# Patient Record
Sex: MALE | Race: WHITE | NOT HISPANIC OR LATINO | URBAN - METROPOLITAN AREA
[De-identification: names, ages, dates, MRNs, and addresses within clinical notes are randomized per-mention and may not be internally consistent; named-entity substitution may affect disease eponyms.]

---

## 2018-04-22 ENCOUNTER — APPOINTMENT (INPATIENT)
Dept: RADIOLOGY | Facility: HOSPITAL | Age: 73
DRG: 315 | End: 2018-04-22
Attending: HOSPITALIST
Payer: MEDICARE

## 2018-04-22 ENCOUNTER — APPOINTMENT (EMERGENCY)
Dept: RADIOLOGY | Facility: HOSPITAL | Age: 73
DRG: 315 | End: 2018-04-22
Attending: STUDENT IN AN ORGANIZED HEALTH CARE EDUCATION/TRAINING PROGRAM
Payer: MEDICARE

## 2018-04-22 ENCOUNTER — HOSPITAL ENCOUNTER (INPATIENT)
Facility: HOSPITAL | Age: 73
LOS: 5 days | Discharge: HOME | DRG: 315 | End: 2018-04-27
Attending: STUDENT IN AN ORGANIZED HEALTH CARE EDUCATION/TRAINING PROGRAM | Admitting: HOSPITALIST
Payer: MEDICARE

## 2018-04-22 DIAGNOSIS — R07.89 OTHER CHEST PAIN: ICD-10-CM

## 2018-04-22 DIAGNOSIS — R07.9 CHEST PAIN, UNSPECIFIED TYPE: ICD-10-CM

## 2018-04-22 DIAGNOSIS — R79.89 ELEVATED TROPONIN: ICD-10-CM

## 2018-04-22 DIAGNOSIS — R74.8 ABNORMAL LIVER ENZYMES: ICD-10-CM

## 2018-04-22 DIAGNOSIS — I42.9 CARDIOMYOPATHY, UNSPECIFIED TYPE (CMS/HCC): ICD-10-CM

## 2018-04-22 DIAGNOSIS — I48.4 ATYPICAL ATRIAL FLUTTER (CMS/HCC): ICD-10-CM

## 2018-04-22 DIAGNOSIS — I48.92 ATRIAL FLUTTER, UNSPECIFIED TYPE (CMS/HCC): Primary | ICD-10-CM

## 2018-04-22 PROBLEM — R10.11 RIGHT UPPER QUADRANT ABDOMINAL PAIN: Status: ACTIVE | Noted: 2018-04-22

## 2018-04-22 PROBLEM — F41.9 ANXIETY: Status: ACTIVE | Noted: 2018-04-22

## 2018-04-22 PROBLEM — D72.829 LEUKOCYTOSIS: Status: ACTIVE | Noted: 2018-04-22

## 2018-04-22 LAB
AMYLASE SERPL-CCNC: 55 U/L (ref 28–100)
ANION GAP SERPL CALC-SCNC: 10 MEQ/L (ref 3–15)
ATRIAL RATE: 302
BASOPHILS # BLD: 0.06 K/UL (ref 0.01–0.1)
BASOPHILS NFR BLD: 0.5 %
BNP SERPL-MCNC: 325 PG/ML
BUN SERPL-MCNC: 19 MG/DL (ref 8–20)
CALCIUM SERPL-MCNC: 8.6 MG/DL (ref 8.9–10.3)
CHLORIDE SERPL-SCNC: 109 MMOL/L (ref 98–109)
CHOLEST SERPL-MCNC: 128 MG/DL
CO2 SERPL-SCNC: 20 MMOL/L (ref 22–32)
CREAT SERPL-MCNC: 1.2 MG/DL (ref 0.8–1.3)
DIFFERENTIAL METHOD BLD: ABNORMAL
EOSINOPHIL # BLD: 0.58 K/UL (ref 0.04–0.54)
EOSINOPHIL NFR BLD: 4.5 %
ERYTHROCYTE [DISTWIDTH] IN BLOOD BY AUTOMATED COUNT: 13.5 % (ref 11.6–14.4)
GFR SERPL CREATININE-BSD FRML MDRD: 59.2 ML/MIN/1.73M*2
GLUCOSE SERPL-MCNC: 116 MG/DL (ref 70–99)
HCT VFR BLDCO AUTO: 39.7 % (ref 40–51)
HDLC SERPL-MCNC: 55 MG/DL
HDLC SERPL: 2.3 {RATIO}
HGB BLD-MCNC: 13.4 G/DL (ref 13.7–17.5)
IMM GRANULOCYTES # BLD AUTO: 0.04 K/UL (ref 0–0.08)
IMM GRANULOCYTES NFR BLD AUTO: 0.3 %
LDLC SERPL CALC-MCNC: 65 MG/DL
LIPASE SERPL-CCNC: 20 U/L (ref 20–51)
LYMPHOCYTES # BLD: 1.31 K/UL (ref 1.2–3.5)
LYMPHOCYTES NFR BLD: 10.2 %
MCH RBC QN AUTO: 32.9 PG (ref 28–33.2)
MCHC RBC AUTO-ENTMCNC: 33.8 G/DL (ref 32.2–36.5)
MCV RBC AUTO: 97.5 FL (ref 83–98)
MONOCYTES # BLD: 1.38 K/UL (ref 0.3–1)
MONOCYTES NFR BLD: 10.8 %
NEUTROPHILS # BLD: 9.42 K/UL (ref 1.7–7)
NEUTS SEG NFR BLD: 73.7 %
NONHDLC SERPL-MCNC: 73 MG/DL
NRBC BLD-RTO: 0 %
P AXIS: -63
PDW BLD AUTO: 9.9 FL (ref 9.4–12.4)
PLATELET # BLD AUTO: 207 K/UL (ref 150–350)
POTASSIUM SERPL-SCNC: 4 MMOL/L (ref 3.6–5.1)
QRS DURATION: 102
QT INTERVAL: 318
QTC CALCULATION(BAZETT): 504
R AXIS: 84
RBC # BLD AUTO: 4.07 M/UL (ref 4.5–5.8)
SODIUM SERPL-SCNC: 139 MMOL/L (ref 136–144)
T WAVE AXIS: 57
TRIGL SERPL-MCNC: 38 MG/DL (ref 30–149)
TROPONIN I SERPL-MCNC: 0.05 NG/ML
TROPONIN I SERPL-MCNC: 0.06 NG/ML
TROPONIN I SERPL-MCNC: 0.06 NG/ML
TSH SERPL DL<=0.05 MIU/L-ACNC: 2.7 UIU/ML (ref 0.34–5.6)
VENTRICULAR RATE: 151
WBC # BLD AUTO: 12.79 K/UL (ref 3.8–10.5)

## 2018-04-22 PROCEDURE — 76705 ECHO EXAM OF ABDOMEN: CPT

## 2018-04-22 PROCEDURE — 83690 ASSAY OF LIPASE: CPT | Performed by: HOSPITALIST

## 2018-04-22 PROCEDURE — 96374 THER/PROPH/DIAG INJ IV PUSH: CPT

## 2018-04-22 PROCEDURE — 71045 X-RAY EXAM CHEST 1 VIEW: CPT

## 2018-04-22 PROCEDURE — 63700000 HC SELF-ADMINISTRABLE DRUG: Performed by: HOSPITALIST

## 2018-04-22 PROCEDURE — 84484 ASSAY OF TROPONIN QUANT: CPT | Mod: 91 | Performed by: PHYSICIAN ASSISTANT

## 2018-04-22 PROCEDURE — 84443 ASSAY THYROID STIM HORMONE: CPT | Performed by: HOSPITALIST

## 2018-04-22 PROCEDURE — 63600105 HC IODINE BASED CONTRAST: Performed by: PHYSICIAN ASSISTANT

## 2018-04-22 PROCEDURE — 96375 TX/PRO/DX INJ NEW DRUG ADDON: CPT

## 2018-04-22 PROCEDURE — 83880 ASSAY OF NATRIURETIC PEPTIDE: CPT | Performed by: PHYSICIAN ASSISTANT

## 2018-04-22 PROCEDURE — 63600000 HC DRUGS/DETAIL CODE: Performed by: PHYSICIAN ASSISTANT

## 2018-04-22 PROCEDURE — 63600000 HC DRUGS/DETAIL CODE: Performed by: HOSPITALIST

## 2018-04-22 PROCEDURE — 25800000 HC PHARMACY IV SOLUTIONS: Performed by: PHYSICIAN ASSISTANT

## 2018-04-22 PROCEDURE — 36415 COLL VENOUS BLD VENIPUNCTURE: CPT | Performed by: HOSPITALIST

## 2018-04-22 PROCEDURE — 71260 CT THORAX DX C+: CPT

## 2018-04-22 PROCEDURE — 21400000 HC ROOM AND CARE CCU/INTERMEDIATE

## 2018-04-22 PROCEDURE — 99285 EMERGENCY DEPT VISIT HI MDM: CPT | Mod: 25

## 2018-04-22 PROCEDURE — 25800000 HC PHARMACY IV SOLUTIONS: Performed by: HOSPITALIST

## 2018-04-22 PROCEDURE — 93005 ELECTROCARDIOGRAM TRACING: CPT | Performed by: HOSPITALIST

## 2018-04-22 PROCEDURE — 84484 ASSAY OF TROPONIN QUANT: CPT | Performed by: PHYSICIAN ASSISTANT

## 2018-04-22 PROCEDURE — 93005 ELECTROCARDIOGRAM TRACING: CPT | Performed by: PHYSICIAN ASSISTANT

## 2018-04-22 PROCEDURE — 80061 LIPID PANEL: CPT | Performed by: HOSPITALIST

## 2018-04-22 PROCEDURE — 82150 ASSAY OF AMYLASE: CPT | Performed by: HOSPITALIST

## 2018-04-22 PROCEDURE — 84484 ASSAY OF TROPONIN QUANT: CPT | Performed by: HOSPITALIST

## 2018-04-22 PROCEDURE — 80048 BASIC METABOLIC PNL TOTAL CA: CPT | Performed by: PHYSICIAN ASSISTANT

## 2018-04-22 PROCEDURE — 25000000 HC PHARMACY GENERAL: Performed by: PHYSICIAN ASSISTANT

## 2018-04-22 PROCEDURE — 200200 PR NO CHARGE: Performed by: HOSPITALIST

## 2018-04-22 PROCEDURE — 99223 1ST HOSP IP/OBS HIGH 75: CPT | Performed by: HOSPITALIST

## 2018-04-22 PROCEDURE — 36415 COLL VENOUS BLD VENIPUNCTURE: CPT | Performed by: PHYSICIAN ASSISTANT

## 2018-04-22 PROCEDURE — 63700000 HC SELF-ADMINISTRABLE DRUG: Performed by: PHYSICIAN ASSISTANT

## 2018-04-22 PROCEDURE — 85025 COMPLETE CBC W/AUTO DIFF WBC: CPT | Performed by: PHYSICIAN ASSISTANT

## 2018-04-22 RX ORDER — DEXTROSE 50 % IN WATER (D50W) INTRAVENOUS SYRINGE
25 AS NEEDED
Status: DISCONTINUED | OUTPATIENT
Start: 2018-04-22 | End: 2018-04-27 | Stop reason: HOSPADM

## 2018-04-22 RX ORDER — METOPROLOL TARTRATE 1 MG/ML
5 INJECTION, SOLUTION INTRAVENOUS ONCE
Status: COMPLETED | OUTPATIENT
Start: 2018-04-22 | End: 2018-04-22

## 2018-04-22 RX ORDER — MELATONIN 5 MG
5 CAPSULE ORAL NIGHTLY PRN
Status: DISCONTINUED | OUTPATIENT
Start: 2018-04-22 | End: 2018-04-27 | Stop reason: HOSPADM

## 2018-04-22 RX ORDER — ENOXAPARIN SODIUM 100 MG/ML
40 INJECTION SUBCUTANEOUS
Status: CANCELLED | OUTPATIENT
Start: 2018-04-22 | End: 2018-07-21

## 2018-04-22 RX ORDER — DEXTROSE 40 %
15-30 GEL (GRAM) ORAL AS NEEDED
Status: DISCONTINUED | OUTPATIENT
Start: 2018-04-22 | End: 2018-04-27 | Stop reason: HOSPADM

## 2018-04-22 RX ORDER — ONDANSETRON HYDROCHLORIDE 2 MG/ML
4 INJECTION, SOLUTION INTRAVENOUS EVERY 8 HOURS PRN
Status: DISCONTINUED | OUTPATIENT
Start: 2018-04-22 | End: 2018-04-27 | Stop reason: HOSPADM

## 2018-04-22 RX ORDER — DILTIAZEM HCL IN NACL,ISO-OSM 125 MG/125
15 PLASTIC BAG, INJECTION (ML) INTRAVENOUS
Status: DISPENSED | OUTPATIENT
Start: 2018-04-22 | End: 2018-04-22

## 2018-04-22 RX ORDER — ONDANSETRON HYDROCHLORIDE 2 MG/ML
4 INJECTION, SOLUTION INTRAVENOUS EVERY 8 HOURS PRN
Status: CANCELLED | OUTPATIENT
Start: 2018-04-22 | End: 2018-07-21

## 2018-04-22 RX ORDER — ENOXAPARIN SODIUM 100 MG/ML
40 INJECTION SUBCUTANEOUS
Status: DISCONTINUED | OUTPATIENT
Start: 2018-04-22 | End: 2018-04-22

## 2018-04-22 RX ORDER — ACETAMINOPHEN 325 MG/1
650 TABLET ORAL EVERY 4 HOURS PRN
Status: CANCELLED | OUTPATIENT
Start: 2018-04-22 | End: 2018-07-21

## 2018-04-22 RX ORDER — DEXTROSE 40 %
15-30 GEL (GRAM) ORAL AS NEEDED
Status: CANCELLED | OUTPATIENT
Start: 2018-04-22 | End: 2018-07-21

## 2018-04-22 RX ORDER — ALUMINUM HYDROXIDE, MAGNESIUM HYDROXIDE, AND SIMETHICONE 1200; 120; 1200 MG/30ML; MG/30ML; MG/30ML
30 SUSPENSION ORAL EVERY 4 HOURS PRN
Status: DISCONTINUED | OUTPATIENT
Start: 2018-04-22 | End: 2018-04-27 | Stop reason: HOSPADM

## 2018-04-22 RX ORDER — ATROPINE SULFATE 0.1 MG/ML
0.5 INJECTION INTRAVENOUS EVERY 5 MIN PRN
Status: CANCELLED | OUTPATIENT
Start: 2018-04-22 | End: 2018-07-21

## 2018-04-22 RX ORDER — PANTOPRAZOLE SODIUM 40 MG/1
40 TABLET, DELAYED RELEASE ORAL DAILY
Status: DISCONTINUED | OUTPATIENT
Start: 2018-04-22 | End: 2018-04-27 | Stop reason: HOSPADM

## 2018-04-22 RX ORDER — DILTIAZEM HCL IN NACL,ISO-OSM 125 MG/125
5-15 PLASTIC BAG, INJECTION (ML) INTRAVENOUS
Status: CANCELLED | OUTPATIENT
Start: 2018-04-22 | End: 2018-04-29

## 2018-04-22 RX ORDER — ENOXAPARIN SODIUM 100 MG/ML
1 INJECTION SUBCUTANEOUS EVERY 12 HOURS
Status: DISCONTINUED | OUTPATIENT
Start: 2018-04-22 | End: 2018-04-26

## 2018-04-22 RX ORDER — DEXTROSE 50 % IN WATER (D50W) INTRAVENOUS SYRINGE
25 AS NEEDED
Status: CANCELLED | OUTPATIENT
Start: 2018-04-22 | End: 2018-07-21

## 2018-04-22 RX ORDER — IBUPROFEN 200 MG
16-32 TABLET ORAL AS NEEDED
Status: CANCELLED | OUTPATIENT
Start: 2018-04-22 | End: 2018-07-21

## 2018-04-22 RX ORDER — DILTIAZEM HYDROCHLORIDE 5 MG/ML
5 INJECTION INTRAVENOUS ONCE
Status: COMPLETED | OUTPATIENT
Start: 2018-04-22 | End: 2018-04-22

## 2018-04-22 RX ORDER — CARVEDILOL 6.25 MG/1
6.25 TABLET ORAL 2 TIMES DAILY WITH MEALS
Status: DISCONTINUED | OUTPATIENT
Start: 2018-04-22 | End: 2018-04-22

## 2018-04-22 RX ORDER — NAPROXEN SODIUM 220 MG/1
324 TABLET, FILM COATED ORAL ONCE
Status: COMPLETED | OUTPATIENT
Start: 2018-04-22 | End: 2018-04-22

## 2018-04-22 RX ORDER — ACETAMINOPHEN 325 MG/1
650 TABLET ORAL EVERY 4 HOURS PRN
Status: DISCONTINUED | OUTPATIENT
Start: 2018-04-22 | End: 2018-04-25

## 2018-04-22 RX ORDER — DOCUSATE SODIUM 100 MG/1
100 CAPSULE, LIQUID FILLED ORAL 2 TIMES DAILY
Status: CANCELLED | OUTPATIENT
Start: 2018-04-22 | End: 2018-07-21

## 2018-04-22 RX ORDER — IBUPROFEN 200 MG
16-32 TABLET ORAL AS NEEDED
Status: DISCONTINUED | OUTPATIENT
Start: 2018-04-22 | End: 2018-04-27 | Stop reason: HOSPADM

## 2018-04-22 RX ORDER — SODIUM CHLORIDE 9 MG/ML
INJECTION, SOLUTION INTRAVENOUS CONTINUOUS
Status: DISCONTINUED | OUTPATIENT
Start: 2018-04-22 | End: 2018-04-25

## 2018-04-22 RX ORDER — ASPIRIN 325 MG
325 TABLET, DELAYED RELEASE (ENTERIC COATED) ORAL DAILY
Status: CANCELLED | OUTPATIENT
Start: 2018-04-22 | End: 2018-07-21

## 2018-04-22 RX ORDER — METOPROLOL TARTRATE 25 MG/1
25 TABLET, FILM COATED ORAL 2 TIMES DAILY
Status: DISCONTINUED | OUTPATIENT
Start: 2018-04-22 | End: 2018-04-25

## 2018-04-22 RX ORDER — KETOROLAC TROMETHAMINE 15 MG/ML
15 INJECTION, SOLUTION INTRAMUSCULAR; INTRAVENOUS EVERY 6 HOURS PRN
Status: DISCONTINUED | OUTPATIENT
Start: 2018-04-22 | End: 2018-04-26

## 2018-04-22 RX ORDER — DOCUSATE SODIUM 100 MG/1
100 CAPSULE, LIQUID FILLED ORAL 2 TIMES DAILY
Status: DISCONTINUED | OUTPATIENT
Start: 2018-04-22 | End: 2018-04-27 | Stop reason: HOSPADM

## 2018-04-22 RX ADMIN — KETOROLAC TROMETHAMINE 15 MG: 15 INJECTION, SOLUTION INTRAMUSCULAR; INTRAVENOUS at 11:49

## 2018-04-22 RX ADMIN — KETOROLAC TROMETHAMINE 15 MG: 15 INJECTION, SOLUTION INTRAMUSCULAR; INTRAVENOUS at 20:41

## 2018-04-22 RX ADMIN — METOPROLOL TARTRATE 5 MG: 5 INJECTION, SOLUTION INTRAVENOUS at 04:37

## 2018-04-22 RX ADMIN — Medication 10 MG/HR: at 02:12

## 2018-04-22 RX ADMIN — DILTIAZEM HYDROCHLORIDE 5 MG: 5 INJECTION INTRAVENOUS at 02:10

## 2018-04-22 RX ADMIN — PANTOPRAZOLE SODIUM 40 MG: 40 TABLET, DELAYED RELEASE ORAL at 11:49

## 2018-04-22 RX ADMIN — METOPROLOL TARTRATE 25 MG: 25 TABLET ORAL at 12:07

## 2018-04-22 RX ADMIN — SODIUM CHLORIDE: 9 INJECTION, SOLUTION INTRAVENOUS at 15:43

## 2018-04-22 RX ADMIN — METOPROLOL TARTRATE 25 MG: 25 TABLET ORAL at 20:41

## 2018-04-22 RX ADMIN — ASPIRIN 81 MG 324 MG: 81 TABLET ORAL at 04:19

## 2018-04-22 RX ADMIN — SODIUM CHLORIDE 500 ML: 9 INJECTION, SOLUTION INTRAVENOUS at 02:03

## 2018-04-22 RX ADMIN — ONDANSETRON HYDROCHLORIDE 4 MG: 2 INJECTION, SOLUTION INTRAMUSCULAR; INTRAVENOUS at 23:32

## 2018-04-22 RX ADMIN — SODIUM CHLORIDE 250 ML: 9 INJECTION, SOLUTION INTRAVENOUS at 04:53

## 2018-04-22 RX ADMIN — ONDANSETRON HYDROCHLORIDE 4 MG: 2 INJECTION, SOLUTION INTRAMUSCULAR; INTRAVENOUS at 16:45

## 2018-04-22 RX ADMIN — IOHEXOL 120 ML: 300 INJECTION, SOLUTION INTRAVENOUS at 03:03

## 2018-04-22 RX ADMIN — ENOXAPARIN SODIUM 75 MG: 100 INJECTION SUBCUTANEOUS at 12:06

## 2018-04-22 RX ADMIN — AMIODARONE HYDROCHLORIDE 1 MG/MIN: 50 INJECTION, SOLUTION INTRAVENOUS at 09:51

## 2018-04-22 RX ADMIN — DOCUSATE SODIUM 100 MG: 100 CAPSULE, LIQUID FILLED ORAL at 20:40

## 2018-04-22 RX ADMIN — ENOXAPARIN SODIUM 75 MG: 100 INJECTION SUBCUTANEOUS at 22:29

## 2018-04-22 ASSESSMENT — COGNITIVE AND FUNCTIONAL STATUS - GENERAL
DRESSING REGULAR LOWER BODY CLOTHING: 4 - NONE
EATING MEALS: 4 - NONE
WALKING IN HOSPITAL ROOM: 4 - NONE
MOVING TO AND FROM BED TO CHAIR: 4 - NONE
CLIMB 3 TO 5 STEPS WITH RAILING: 4 - NONE
STANDING UP FROM CHAIR USING ARMS: 4 - NONE
HELP NEEDED FOR PERSONAL GROOMING: 4 - NONE
TOILETING: 4 - NONE
HELP NEEDED FOR BATHING: 4 - NONE
DRESSING REGULAR UPPER BODY CLOTHING: 4 - NONE

## 2018-04-22 ASSESSMENT — ENCOUNTER SYMPTOMS
FEVER: 0
DIZZINESS: 0
COUGH: 0
ABDOMINAL PAIN: 0
CHILLS: 0

## 2018-04-22 NOTE — ED ATTESTATION NOTE
I saw and evaluated the patient, participated in the management, and agree with the findings in the above note. We discussed the case and the treatment plan.  Exam: vs reviewed, nad, nontoxic, sitting upright, normal speech, lungs ctab, cardiac irregularly irregular with tachycardic rate in the 140-150s, no sign of trauma, no neuro deficit.      Kyle Lee,   04/22/18 0445

## 2018-04-22 NOTE — SUBJECTIVE & OBJECTIVE
Hospital Medicine Service -  History & Physical        CHIEF COMPLAINT   Palpitations and pleuritic chest pain     HISTORY OF PRESENT ILLNESS      Alex Gonzalez is a 73 y.o. male with a past medical history of pneumonia in February, but otherwise no significant medical history who presents with palpitations and pleuritic chest pain which awakened him around midnight.  She was in his usual state of health yesterday, attended the event where he did have a glass of wine, and the beer which is unusual for him, and stated that he felt unwell before he went to bed could not really give this any specific description.  Is awakened around midnight with a sense of pain in his chest which worsened with deep inspiration, and racing heart.  He states that in the past, off and on for much of his life, he has had episodes of racing heart which of only lasted a few seconds and resolved independently, however this episode was prolonged, would not break independently, and was associated with chest pain.  Therefore the patient was brought to the emergency room by his wife where he was found to be in atrial flutter with a rapid ventricular response.  First troponin was 0.06, and the chest x-ray was unremarkable.  A CT of the chest was done which ruled out PE, did show some left basilar atelectasis, and is other lab work was positive for very slight elevation in white blood cell count.  Patient's blood pressure mildly low initially at 97/61 with a heart rate of 150.  Her sugar is now up to 118/78, heart rate is down to 100, this is after giving Cardizem initially, which did not help his heart rate but now after 5 mg of IV Lopressor his heart rate is down to just around 100.  He is being admitted for this chest pain and arrhythmia.  Cardiology will be consulted.     PAST MEDICAL AND SURGICAL HISTORY      Past Medical History:   Diagnosis Date   • Skin cancer        Past Surgical History:   Procedure Laterality Date   • BASAL CELL  CARCINOMA EXCISION         MEDICATIONS      Prior to Admission medications    Not on File       ALLERGIES      Patient has no known allergies.    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    Physical Exam  General:    Skin:   HEENT:   Neck:     Chest:       Cardiac:    Abdomen:   Extremities:   Neurologic:    Psychiatric:     LABS / IMAGING / EKG        Labs  I have reviewed the patient's pertinent labs.  Significant abnormals are As follows.    Results from last 7 days  Lab Units 04/22/18  0153   TROPONIN I ng/mL 0.06*       Results from last 7 days  Lab Units 04/22/18  0153   WBC K/uL 12.79*   HEMOGLOBIN g/dL 13.4*   HEMATOCRIT % 39.7*   PLATELETS K/uL 207       Results from last 7 days  Lab Units 04/22/18  0153   SODIUM mmol/L 139   POTASSIUM mmol/L 4.0   CHLORIDE mmol/L 109   CO2 mmol/L 20*   BUN mg/dL 19   CREATININE mg/dL 1.2   CALCIUM mg/dL 8.6*   GLUCOSE mg/dL 116*     Imaging  Significant findings include:   CXR: NAD  CT chest with contrast: No PE, there is left basilar atelectasis    ECG/Telemetry  I have independently reviewed the ECG.  Significant findings include Initial EKG showed atrial flutter with a rapid ventricular response in the rate of 140-150, it is now still atrial flutter but the rate is down to 100 after IV Lopressor.    ASSESSMENT AND PLAN              VTE Assessment: Padua VTE Score: 1  VTE Prophylaxis Plan: lovenox  Code Status: Full Code  Estimated Discharge Date: 4/24/2018  Disposition Planning: return home with wife     Tiffany Jolly MD  4/22/2018

## 2018-04-22 NOTE — ASSESSMENT & PLAN NOTE
-Trend the troponin  -Echocardiogram  -At some point the patient should have a stress test  -Pain is now minimal, and the blood pressure is too low to give nitroglycerin.  I will order Toradol as a as needed for residual pain

## 2018-04-22 NOTE — H&P
History & Physical    Subjective/Objective:     Hospital Medicine Service -  History & Physical        CHIEF COMPLAINT   Palpitations and pleuritic chest pain     HISTORY OF PRESENT ILLNESS      Alex Gonzalez is a 73 y.o. male with a past medical history of pneumonia in February, but otherwise no significant medical history who presents with palpitations and pleuritic chest pain which awakened him around midnight.  She was in his usual state of health yesterday, attended the event where he did have a glass of wine, and the beer which is unusual for him, and stated that he felt unwell before he went to bed could not really give this any specific description.  Is awakened around midnight with a sense of pain in his chest which worsened with deep inspiration, and racing heart.  He states that in the past, off and on for much of his life, he has had episodes of racing heart which of only lasted a few seconds and resolved independently, however this episode was prolonged, would not break independently, and was associated with chest pain.  Therefore the patient was brought to the emergency room by his wife where he was found to be in atrial flutter with a rapid ventricular response.  First troponin was 0.06, and the chest x-ray was unremarkable.  A CT of the chest was done which ruled out PE, did show some left basilar atelectasis, and is other lab work was positive for very slight elevation in white blood cell count.  Patient's blood pressure mildly low initially at 97/61 with a heart rate of 150.  Her sugar is now up to 118/78, heart rate is down to 100, this is after giving Cardizem initially, which did not help his heart rate but now after 5 mg of IV Lopressor his heart rate is down to just around 100.  He is being admitted for this chest pain and arrhythmia.  Cardiology will be consulted.     PAST MEDICAL AND SURGICAL HISTORY      Past Medical History:   Diagnosis Date   • Skin cancer        Past Surgical History:    Procedure Laterality Date   • BASAL CELL CARCINOMA EXCISION         MEDICATIONS      Prior to Admission medications    Not on File       ALLERGIES      Patient has no known allergies.    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    Physical Exam  General:    Skin:   HEENT:   Neck:     Chest:       Cardiac:    Abdomen:   Extremities:   Neurologic:    Psychiatric:     LABS / IMAGING / EKG        Labs  I have reviewed the patient's pertinent labs.  Significant abnormals are As follows.    Results from last 7 days  Lab Units 04/22/18  0153   TROPONIN I ng/mL 0.06*       Results from last 7 days  Lab Units 04/22/18  0153   WBC K/uL 12.79*   HEMOGLOBIN g/dL 13.4*   HEMATOCRIT % 39.7*   PLATELETS K/uL 207       Results from last 7 days  Lab Units 04/22/18  0153   SODIUM mmol/L 139   POTASSIUM mmol/L 4.0   CHLORIDE mmol/L 109   CO2 mmol/L 20*   BUN mg/dL 19   CREATININE mg/dL 1.2   CALCIUM mg/dL 8.6*   GLUCOSE mg/dL 116*     Imaging  Significant findings include:   CXR: NAD  CT chest with contrast: No PE, there is left basilar  atelectasis    ECG/Telemetry  I have independently reviewed the ECG. Significant findings include Initial EKG showed atrial flutter with a rapid ventricular response in the rate of 140-150, it is now still atrial flutter but the rate is down to 100 after IV Lopressor.    ASSESSMENT AND PLAN              VTE Assessment: Padua VTE Score: 1  VTE Prophylaxis Plan: lovenox  Code Status: Full Code  Estimated Discharge Date: 4/24/2018  Disposition Planning: return home with wife     Tiffany Jolly MD  4/22/2018     Assessment/Plan:  Anxiety   Overview    Patient was moderately anxious on arrival, seems calmer now, denies having a chronic problem     Assessment & Plan    -Reassure  -Ativan if needed        Atrial flutter (CMS/HCC) (HCC)   Overview    Patient thinks he may have had palpitations off-and-on in his lifetime but they only ever lasted a few seconds and never required him seeking medical advice.  Tonight the rate is now controlled with Lopressor.     Assessment & Plan    -Stopped the Cardizem drip since the heart rate is now in the 60s  -Consult cardiology  -Trend the troponin  -Monitor on telemetry and pulse ox  -Patient should have an echocardiogram and an outpatient Holter monitor even if he converts later this morning  -I have not ordered further medication because the heart rate is continued to come down with a single dose of Lopressor        Chest pain   Overview    Patient did have substernal chest pain when this started, it was worsened with a deep breath.  PE was ruled out.  Opponent and BMP were both mildly elevated, probably on the basis of demand.     Assessment & Plan    -Trend the troponin  -Echocardiogram  -At some point the patient should have a stress test  -Pain is now minimal, and the blood pressure is too low to give nitroglycerin.  I will order Toradol as a as needed for residual pain            Code Status: Full Code  Estimated discharge date: 4/24/2018  Tiffany Jolly MD

## 2018-04-22 NOTE — PLAN OF CARE
Problem: Patient Care Overview  Goal: Plan of Care Review  Outcome: Ongoing (interventions implemented as appropriate)   04/22/18 6338   Coping/Psychosocial   Plan Of Care Reviewed With patient;spouse   Plan of Care Review   Progress improving   Outcome Summary pt's 8/10 pain decreased to 7/10 post torodol        Problem: Pain, Acute (Adult)  Goal: Identify Related Risk Factors and Signs and Symptoms  Outcome: Outcome(s) Achieved Date Met: 04/22/18    Goal: Acceptable Pain Control/Comfort Level  Outcome: Ongoing (interventions implemented as appropriate)

## 2018-04-22 NOTE — ASSESSMENT & PLAN NOTE
-Stopped the Cardizem drip since the heart rate is now in the 60s  -Consult cardiology  -Trend the troponin  -Monitor on telemetry and pulse ox  -Patient should have an echocardiogram and an outpatient Holter monitor even if he converts later this morning  -I have not ordered further medication because the heart rate is continued to come down with a single dose of Lopressor

## 2018-04-22 NOTE — ASSESSMENT & PLAN NOTE
Pain improved with toradol and is resolved now after converting to NSR.  Pericarditis possible but not likely- no pericardial abnormality on Echo   CT negative for PE  TN minimally elevated at 0.06. Has stayed flat.   EKG without significant ischemic abn  HIDA negative, MRI abdomen with ascites/periportal edema ,         Plan:  GI work up in progress - worsening LFTs, GI considering IR consult for paracentesis  Cards work-up in progress.  ?doppler US portal veins vs cardiac MRI?  Further evaluation for underlying liver disease vs cardiac disease in progress  Eventual outpt ischemic eval

## 2018-04-22 NOTE — CONSULTS
Cardiology Consult Note  Subjective     Alex Gonzalez is a 73 y.o. male with little past medical history other than pneumonia in February of this year who presents with palpitations and chest pain which awoke him from sleep around midnight.  He did wake up in the middle the night Friday with similar symptoms of palpitations and feeling anxious however the episode resolved and he did not think much of it.  He was in his usual state of health yesterday and felt well.  He went to an event last night where he had 2 alcoholic drinks and then went to bed.  He describes the pain as sharp and severe.  It is located primarily in his upper epigastric area.  Pain is worse with palpation of the area or taking a deep breath.  On arrival to the emergency room initial EKG shows atrial flutter with 2-1 conduction at a rate of 151 bpm.  He was started on a Cardizem drip which dropped his blood pressure and this was discontinued.  He was then given a dose of IV Lopressor which helped to control his heart rate and also relieved his chest pain.  He is now back in atrial flutter in the 140s and is very uncomfortable.  He complains of sharp severe chest pain.  He denies any prior similar symptoms.  He has no known history of dysrhythmias.  He has no prior cardiac history.  He reports some lower extremity edema which has been going on for some time and cuts the site of his socks so that they fit.  Otherwise he has not had symptoms of chest pain or shortness of breath.  His sister who is a few years older has had some atrial arrhythmias he believes.     Unable to review outside records..    Past Medical History:   Diagnosis Date   • Skin cancer        Past Surgical History:   Procedure Laterality Date   • BASAL CELL CARCINOMA EXCISION         Social History     Social History Narrative    Plays for private events       Family History   Problem Relation Age of Onset   • Atrial fibrillation Sister        Patient has no known  allergies.    Current Facility-Administered Medications   Medication Dose Route Frequency Provider Last Rate Last Dose   • acetaminophen (TYLENOL) tablet 650 mg  650 mg oral q4h PRN Tiffany Jolly MD       • alum-mag hydroxide-simeth (MAALOX) 200-200-20 mg/5 mL suspension 30 mL  30 mL oral q4h PRN Tiffany Jolly MD       • amiodarone (CORDARONE) 450 mg in dextrose 5 % 250 mL (1.8 mg/mL) infusion  1 mg/min intravenous Continuous CANDACE Salcido       • amiodarone (CORDARONE) 450 mg in dextrose 5 % 250 mL (1.8 mg/mL) infusion  0.5 mg/min intravenous Continuous CANDACE Salcido       • amiodarone (CORDARONE) bolus from bag 150 mg  150 mg intravenous Once CANDACE Salcido       • carvedilol (COREG) tablet 6.25 mg  6.25 mg oral BID with meals Tiffany Jolly MD       • glucose chewable tablet 16-32 g of dextrose  16-32 g of dextrose oral PRN Tiffany Jolly MD        Or   • dextrose 40 % oral gel 15-30 g of dextrose  15-30 g of dextrose oral PRN Tiffany Jolly MD        Or   • glucagon (GLUCAGEN) injection 1 mg  1 mg intramuscular PRN Tiffany Jolly MD        Or   • dextrose in water injection 12.5 g  25 mL intravenous PRN Tiffany Jolly MD       • dilTIAZem HCl in 0.9% NaCl (CARDIZEM) 125 mg/125 mL (1 mg/mL) infusion  15 mg/hr intravenous Titrated CANDACE Doe   Stopped at 04/22/18 0628   • docusate sodium (COLACE) capsule 100 mg  100 mg oral BID Tiffany Jolly MD       • enoxaparin (LOVENOX) syringe 40 mg  40 mg subcutaneous Daily (6p) Tiffany Jolly MD       • ketorolac (TORADOL) injection 15 mg  15 mg intravenous q6h PRN Tiffany Jolly MD       • melatonin capsule 5 mg  5 mg oral Nightly PRN Tiffany Jolly MD       • ondansetron (ZOFRAN) injection 4 mg  4 mg intravenous q8h PRN Tiffany Jolly MD       • pantoprazole (PROTONIX) tablet,delayed release (DR/EC) 40 mg  40 mg oral Daily Tiffany Jolly MD       •  "sodium chloride 0.9 % infusion   intravenous Continuous Tiffany Jolly MD         No current outpatient prescriptions on file.       Review of Systems  Please see ROS as listed above in the HPI. All other ROS negative in detail.     Objective     Labs   Lab Results   Component Value Date    WBC 12.79 (H) 04/22/2018    HGB 13.4 (L) 04/22/2018    HCT 39.7 (L) 04/22/2018     04/22/2018     04/22/2018    K 4.0 04/22/2018     04/22/2018    CREATININE 1.2 04/22/2018    BUN 19 04/22/2018    CO2 20 (L) 04/22/2018    TSH 2.70 04/22/2018     Lab Results   Component Value Date    TROPONINI 0.06 (H) 04/22/2018       Imaging  I have independently reviewed the pertinent imaging from the last 24 hrs.   CT chest: 1.  Left basilar subsegmental atelectasis.  2.  No evidence of pulmonary embolism to the level of the proximal segmental  pulmonary artery.    CXR- nad     ECG   I have personally reviewed ECG  #1 Atrial flutter 151 bpm   #2 Atrial flutter 142 bpm  #3 Atrial flutter 93 bpm (after IV lopressor)    Telemetry  I have personally reviewed telemetry  Atrial flutter 90s-140s    Echocardiogram  N/a      Stress test  n/a    Cardiac catheterization  N/a      Physical Exam  BP (!) 118/50   Pulse 86   Temp 37.5 °C (99.5 °F) (Tympanic)   Resp (!) 22   Ht 1.803 m (5' 11\")   Wt 72.6 kg (160 lb)   SpO2 98%   BMI 22.32 kg/m²   General: Awake, alert, NAD, well-developed, well-nourished   Head:  Neck:  Lungs:   Normocephalic, atraumatic  Supple, no JVD, no carotid bruits bilaterally, no thyromegaly  Clear to auscultation bilaterally, respirations unlabored   Chest wall:  No tenderness or deformity   Heart:  Irregular, tachy , normal S1/S2  No murmur, rub or gallop   Abdomen:  Extremities:  Vascular:  Neurologic: Positive bowel sounds, soft, non-tender, non-distended  +1 edema bilaterally, no cyanosis  Palpable dorsalis pedis bilaterally  Moves all four extremities   Psychiatric: Congruent mood and affect "         Assessment   73 y.o. male being consulted for Atrial flutter and chest pain    Atrial flutter (CMS/HCC) (HCC)   Assessment & Plan    Pt in 2:1 atrial flutter   Bp dropped with Cardizem. Lopressor effective in decreasing HR but now back to HR of 140s.   Start Amio bolus and gtt for rate/rhythm control.   [id number] - 1(age) - consider NOAC for AC - will order full dose Lovenox for now, and then can decide on oral anticoagulation  Check echo  TSH/T4  Repeat TN given chest pain        Chest pain   Assessment & Plan    CT negative for PE  TN minimally elevated at 0.06. Repeat TN pending.   CP sounds pleuritic in nature - severe cp with deep breath   EKG without significant ischemic abn                    This is CANDACE Fried, physician assistant, acting as a dictating scribe for CANDACE Toribio  4/22/2018

## 2018-04-22 NOTE — ASSESSMENT & PLAN NOTE
Abdominal pain improved  MRI noted  HIDA negative  LFTs rising- increased 2fold today, Tbili trending down        Plan:  GI Consult noted  Further work-up in progress  Surgery following-no plans for surgery   Advance diet.

## 2018-04-22 NOTE — ASSESSMENT & PLAN NOTE
Pt was in symptomatic 2:1 atrial flutter, converted to SR with improvement in symptoms.   [id number] - 2 (age) - consider NOAC for AC- hold for now pending liver issues, INR 1.5  Echo with severely dilated RA  TSH/T4 Pending     Plan:  No further amio with elevated LFTs, continue lopressor 25 bid  Hold on OAC for now- on full dose lovenox.    Continue tele   F/u TSH

## 2018-04-22 NOTE — PROGRESS NOTES
Hospital Medicine Service -  Daily Progress Note       SUBJECTIVE   Interval History: Patient seen and examined. Wife present in room with patient.  Patient reports abdominal pain int he epigastric and right upper quadrent area.  Started in the middle of the night.  No nausea vomiting  No chest pain,dyspnea,dizziness at present  No urinary symptoms   Discussed plan of care with patient  and nurse and wife and Dr Pérez.  Total time spent including face to face encounter,discussing plan of care with patient,family,consultants>30 Mins.       OBJECTIVE      Vital signs in last 24 hours:  Temp:  [36.7 °C (98 °F)-37.5 °C (99.5 °F)] 36.7 °C (98 °F)  Heart Rate:  [] 135  Resp:  [15-29] 22  BP: ()/(50-89) 123/79    Intake/Output Summary (Last 24 hours) at 04/22/18 1415  Last data filed at 04/22/18 0628   Gross per 24 hour   Intake           542.67 ml   Output                0 ml   Net           542.67 ml       PHYSICAL EXAMINATION      Physical Exam   Constitutional: He is oriented to person, place, and time.   Neck: Neck supple.   Cardiovascular:   Irregular and tachycardic   Pulmonary/Chest: Effort normal and breath sounds normal.   Abdominal: Soft. Bowel sounds are normal. He exhibits no distension.   Tender to palpate in the epigastric and the right upper quadrant.  Guarding in the right upper quadrant, no rigidity   Musculoskeletal: Normal range of motion. He exhibits no edema.   Neurological: He is alert and oriented to person, place, and time. No cranial nerve deficit.   Skin: Skin is warm and dry.   Psychiatric: His behavior is normal.   Nursing note and vitals reviewed.     LABS / IMAGING / TELE      Labs  I have reviewed the patient's pertinent labs.  Significant abnormals are below.    Results from last 7 days  Lab Units 04/22/18  0153   WBC K/uL 12.79*   HEMOGLOBIN g/dL 13.4*   HEMATOCRIT % 39.7*   PLATELETS K/uL 207         Results from last 7 days  Lab Units 04/22/18  0153   SODIUM mmol/L 139    POTASSIUM mmol/L 4.0   CHLORIDE mmol/L 109   CO2 mmol/L 20*   BUN mg/dL 19   CREATININE mg/dL 1.2   GLUCOSE mg/dL 116*   CALCIUM mg/dL 8.6*         Imaging  I have independently reviewed the pertinent imaging from the last 24 hrs.  X-ray Chest 1 View    Result Date: 4/22/2018  IMPRESSION: No active disease.     Ct Chest Pulmonary Embolism With Iv Contrast    Result Date: 4/22/2018  IMPRESSION: 1.  Left basilar subsegmental atelectasis. 2.  No evidence of pulmonary embolism to the level of the proximal segmental pulmonary artery. A preliminary report was provided to the Emergency Department by the radiology resident at 3:30 AM on the date of the examination. I certify that I have reviewed this examination and agree with this report. Dinora Bloom MD      ECG/Telemetry  I have independently reviewed the telemetry. Significant findings include atrial flutter.    ASSESSMENT AND PLAN      Atrial flutter (CMS/HCC) (HCC)   Assessment & Plan    -Stopped the Cardizem drip since the heart rate is now in the 60s  -cardiology following  -Continue amiodarone            Right upper quadrant abdominal pain   Assessment & Plan    Pain started in the middle of the night.  Has pain in the epigastric in the right upper quadrant.  Concern for pericarditis vs GI issues  Ct  Chest with no pericardial effusion    Check right upper quadrant ultrasound  Pain  Control-will start toradol  Check Amylase and lipase  Start Protonix          Chest pain   Assessment & Plan    Trend troponin  Check ECHO    Cardiology following        Leukocytosis   Assessment & Plan    Likely reactive  Afebrile    Follow         Anxiety   Assessment & Plan      -Ativan if needed             VTE Assessment: Padua VTE Score: 1  VTE Prophylaxis Plan: lovenox  Code Status: Full Code  Estimated Discharge Date: 4/24/2018  Disposition Planning: home     Lucinda Tran MD  4/22/2018

## 2018-04-23 ENCOUNTER — APPOINTMENT (INPATIENT)
Dept: RADIOLOGY | Facility: HOSPITAL | Age: 73
DRG: 315 | End: 2018-04-23
Attending: PHYSICIAN ASSISTANT
Payer: MEDICARE

## 2018-04-23 ENCOUNTER — APPOINTMENT (INPATIENT)
Dept: CARDIOLOGY | Facility: HOSPITAL | Age: 73
DRG: 315 | End: 2018-04-23
Attending: INTERNAL MEDICINE
Payer: MEDICARE

## 2018-04-23 ENCOUNTER — APPOINTMENT (INPATIENT)
Dept: RADIOLOGY | Facility: HOSPITAL | Age: 73
DRG: 315 | End: 2018-04-23
Attending: STUDENT IN AN ORGANIZED HEALTH CARE EDUCATION/TRAINING PROGRAM
Payer: MEDICARE

## 2018-04-23 LAB
ALBUMIN SERPL-MCNC: 3.3 G/DL (ref 3.4–5)
ALBUMIN SERPL-MCNC: 3.5 G/DL (ref 3.4–5)
ALP SERPL-CCNC: 107 IU/L (ref 35–126)
ALP SERPL-CCNC: 112 IU/L (ref 35–126)
ALT SERPL-CCNC: 79 IU/L (ref 16–63)
ALT SERPL-CCNC: 79 IU/L (ref 16–63)
ANION GAP SERPL CALC-SCNC: 8 MEQ/L (ref 3–15)
AORTIC ROOT ANNULUS: 3.1 CM
AST SERPL-CCNC: 93 IU/L (ref 15–41)
AST SERPL-CCNC: 95 IU/L (ref 15–41)
ATRIAL RATE: 141
ATRIAL RATE: 293
ATRIAL RATE: 300
ATRIAL RATE: 66
BASOPHILS # BLD: 0.13 K/UL (ref 0.01–0.1)
BASOPHILS NFR BLD: 1 %
BILIRUB DIRECT SERPL-MCNC: 1.8 MG/DL
BILIRUB DIRECT SERPL-MCNC: 1.9 MG/DL
BILIRUB SERPL-MCNC: 3.3 MG/DL (ref 0.3–1.2)
BILIRUB SERPL-MCNC: 3.4 MG/DL (ref 0.3–1.2)
BSA FOR ECHO PROCEDURE: 1.96 M2
BUN SERPL-MCNC: 27 MG/DL (ref 8–20)
BURR CELLS BLD QL SMEAR: ABNORMAL
CALCIUM SERPL-MCNC: 8.4 MG/DL (ref 8.9–10.3)
CHLORIDE SERPL-SCNC: 110 MMOL/L (ref 98–109)
CO2 SERPL-SCNC: 19 MMOL/L (ref 22–32)
CREAT SERPL-MCNC: 1.3 MG/DL (ref 0.8–1.3)
DIFFERENTIAL METHOD BLD: ABNORMAL
E WAVE DECELERATION TIME: 123 MS
E/A RATIO: 2.1
E/E' RATIO: 10.2
E/LAT E' RATIO: 10.2
EDV (MM-TEICH): 84.9 CM3
EF (A4C): 41.9 %
EF (MM-TEICH): 50.6 %
EF A2C: 61.4 %
EJECTION FRACTION: 52.5 %
EOSINOPHIL # BLD: 0 K/UL (ref 0.04–0.54)
EOSINOPHIL NFR BLD: 0 %
ERYTHROCYTE [DISTWIDTH] IN BLOOD BY AUTOMATED COUNT: 13.7 % (ref 11.6–14.4)
EST RIGHT VENT SYSTOLIC PRESSURE BY TRICUSPID REGURGITATION JET: 26 MMHG
ESV (BP): 34.7 CM3
ESV (MM-TEICH): 41.9 CM3
GFR SERPL CREATININE-BSD FRML MDRD: 54 ML/MIN/1.73M*2
GLUCOSE SERPL-MCNC: 159 MG/DL (ref 70–99)
HCT VFR BLDCO AUTO: 38.6 % (ref 40–51)
HGB BLD-MCNC: 12.8 G/DL (ref 13.7–17.5)
INTERVENTRICULAR SEPTUM: 0.7 CM
LA/AORTA RATIO: 1.35
LAD 2D: 4.2 CM
LEFT INTERNAL DIMENSION IN SYSTOLE: 3.23 CM (ref 2.82–4.27)
LEFT VENTRICLE DIASTOLIC VOLUME INDEX: 26.28 ML/M2
LEFT VENTRICLE DIASTOLIC VOLUME: 51.5 ML
LEFT VENTRICLE SYSTOLIC VOLUME INDEX: 15.2 CM3/M2
LEFT VENTRICLE SYSTOLIC VOLUME: 29.8 CM3
LEFT VENTRICULAR INTERNAL DIMENSION IN DIASTOLE: 4.34 CM (ref 4.79–6.65)
LV DIASTOLIC VOLUME: 104 ML
LV ESV (APICAL 2 CHAMBER): 40 CM3
LVAD-AP2: 30.8 CM2
LVAD-AP4: 21.9 CM2
LVAS-AP2: 18 CM2
LVEDVI(A2C): 53.06 ML/M2
LVESVI(A2C): 20.41 CM3/M2
LVESVI(BP): 17.7 CM3/M2
LVLD-AP2: 7.86 CM
LVLD-AP4: 7.82 CM
LVLS-AP2: 7.14 CM
LVLS-AP4: 6.98 CM
LYMPHOCYTES # BLD: 0.8 K/UL (ref 1.2–3.5)
LYMPHOCYTES NFR BLD: 6 %
MAGNESIUM SERPL-MCNC: 2.3 MG/DL (ref 1.8–2.5)
MCH RBC QN AUTO: 32.7 PG (ref 28–33.2)
MCHC RBC AUTO-ENTMCNC: 33.2 G/DL (ref 32.2–36.5)
MCV RBC AUTO: 98.7 FL (ref 83–98)
MITRAL VALVE MEAN INFLOW VELOCITY: 3.23 M/S
MONOCYTES # BLD: 1.72 K/UL (ref 0.3–1)
MONOCYTES NFR BLD: 13 %
MR FLOW: 18 CM3
MR PISA EROA: 0.12 CM2
MR VELOCITY: 4.33 M/S
MV E'TISSUE VEL-LAT: 0.08 M/S
MV E'TISSUE VEL-MED: 0.08 M/S
MV PEAK A VEL: 0.38 M/S
MV PEAK E VEL: 0.8 M/S
MV VALVE AREA P 1/2 METHOD: 6.11 CM2
MYELOCYTES # BLD MANUAL: 0.13 K/UL
MYELOCYTES NFR BLD MANUAL: 1 %
NEUTS BAND # BLD: 10.34 K/UL (ref 1.7–7)
NEUTS SEG NFR BLD: 78 %
P AXIS: -83
P AXIS: 249
P AXIS: 267
P AXIS: 72
PDW BLD AUTO: 11.1 FL (ref 9.4–12.4)
PLAT MORPH BLD: NORMAL
PLATELET # BLD AUTO: 193 K/UL (ref 150–350)
PLATELET # BLD EST: ABNORMAL 10*3/UL
POSTERIOR WALL: 0.6 CM
POTASSIUM SERPL-SCNC: 4.6 MMOL/L (ref 3.6–5.1)
PR INTERVAL: 142
PR INTERVAL: 226
PROT SERPL-MCNC: 5.9 G/DL (ref 6–8.2)
PROT SERPL-MCNC: 6.4 G/DL (ref 6–8.2)
QRS DURATION: 80
QRS DURATION: 82
QRS DURATION: 88
QRS DURATION: 90
QT INTERVAL: 272
QT INTERVAL: 340
QT INTERVAL: 366
QT INTERVAL: 452
QTC CALCULATION(BAZETT): 416
QTC CALCULATION(BAZETT): 455
QTC CALCULATION(BAZETT): 473
QTC CALCULATION(BAZETT): 523
R AXIS: 81
R AXIS: 83
R AXIS: 85
R AXIS: 87
RBC # BLD AUTO: 3.91 M/UL (ref 4.5–5.8)
SODIUM SERPL-SCNC: 137 MMOL/L (ref 136–144)
T WAVE AXIS: -69
T WAVE AXIS: 57
T WAVE AXIS: 60
T WAVE AXIS: 62
TR MAX PG: 15 MMHG
TRICUSPID VALVE PEAK REGURGITATION VELOCITY: 1.94 M/S
TROPONIN I SERPL-MCNC: 0.05 NG/ML
TSH SERPL DL<=0.05 MIU/L-ACNC: 3.9 UIU/ML (ref 0.34–5.6)
VARIANT LYMPHS NFR BLD: 1 %
VENTRICULAR RATE: 141
VENTRICULAR RATE: 142
VENTRICULAR RATE: 66
VENTRICULAR RATE: 93
WBC # BLD AUTO: 13.26 K/UL (ref 3.8–10.5)
Z-SCORE OF LEFT VENTRICULAR DIMENSION IN END DIASTOLE: -2.62
Z-SCORE OF LEFT VENTRICULAR DIMENSION IN END SYSTOLE: -0.56

## 2018-04-23 PROCEDURE — 78226 HEPATOBILIARY SYSTEM IMAGING: CPT

## 2018-04-23 PROCEDURE — 74183 MRI ABD W/O CNTR FLWD CNTR: CPT

## 2018-04-23 PROCEDURE — A9537 TC99M MEBROFENIN: HCPCS | Performed by: PHYSICIAN ASSISTANT

## 2018-04-23 PROCEDURE — 36415 COLL VENOUS BLD VENIPUNCTURE: CPT | Performed by: HOSPITALIST

## 2018-04-23 PROCEDURE — 63600000 HC DRUGS/DETAIL CODE: Performed by: HOSPITALIST

## 2018-04-23 PROCEDURE — 84443 ASSAY THYROID STIM HORMONE: CPT | Performed by: PHYSICIAN ASSISTANT

## 2018-04-23 PROCEDURE — 85025 COMPLETE CBC W/AUTO DIFF WBC: CPT | Performed by: HOSPITALIST

## 2018-04-23 PROCEDURE — 63700000 HC SELF-ADMINISTRABLE DRUG: Performed by: HOSPITALIST

## 2018-04-23 PROCEDURE — 63600000 HC DRUGS/DETAIL CODE: Performed by: PHYSICIAN ASSISTANT

## 2018-04-23 PROCEDURE — 93005 ELECTROCARDIOGRAM TRACING: CPT | Performed by: HOSPITALIST

## 2018-04-23 PROCEDURE — 25000000 HC PHARMACY GENERAL: Performed by: HOSPITALIST

## 2018-04-23 PROCEDURE — 80053 COMPREHEN METABOLIC PANEL: CPT | Performed by: HOSPITALIST

## 2018-04-23 PROCEDURE — 25800000 HC PHARMACY IV SOLUTIONS: Performed by: HOSPITALIST

## 2018-04-23 PROCEDURE — 84484 ASSAY OF TROPONIN QUANT: CPT | Performed by: HOSPITALIST

## 2018-04-23 PROCEDURE — 25800000 HC PHARMACY IV SOLUTIONS: Performed by: PHYSICIAN ASSISTANT

## 2018-04-23 PROCEDURE — 99233 SBSQ HOSP IP/OBS HIGH 50: CPT | Performed by: HOSPITALIST

## 2018-04-23 PROCEDURE — 83735 ASSAY OF MAGNESIUM: CPT | Performed by: HOSPITALIST

## 2018-04-23 PROCEDURE — A9579 GAD-BASE MR CONTRAST NOS,1ML: HCPCS | Performed by: STUDENT IN AN ORGANIZED HEALTH CARE EDUCATION/TRAINING PROGRAM

## 2018-04-23 PROCEDURE — A9575 INJ GADOTERATE MEGLUMI 0.1ML: HCPCS | Performed by: STUDENT IN AN ORGANIZED HEALTH CARE EDUCATION/TRAINING PROGRAM

## 2018-04-23 PROCEDURE — 93306 TTE W/DOPPLER COMPLETE: CPT

## 2018-04-23 PROCEDURE — 63700000 HC SELF-ADMINISTRABLE DRUG: Performed by: PHYSICIAN ASSISTANT

## 2018-04-23 PROCEDURE — 21400000 HC ROOM AND CARE CCU/INTERMEDIATE

## 2018-04-23 PROCEDURE — 99222 1ST HOSP IP/OBS MODERATE 55: CPT | Performed by: SURGERY

## 2018-04-23 PROCEDURE — 82248 BILIRUBIN DIRECT: CPT | Performed by: HOSPITALIST

## 2018-04-23 RX ORDER — LORAZEPAM 2 MG/ML
0.5 INJECTION INTRAMUSCULAR ONCE
Status: COMPLETED | OUTPATIENT
Start: 2018-04-23 | End: 2018-04-23

## 2018-04-23 RX ORDER — LORAZEPAM 2 MG/ML
0.5 INJECTION INTRAMUSCULAR ONCE
Status: DISPENSED | OUTPATIENT
Start: 2018-04-23 | End: 2018-04-24

## 2018-04-23 RX ORDER — GADOTERATE MEGLUMINE 376.9 MG/ML
14.5 INJECTION INTRAVENOUS ONCE
Status: COMPLETED | OUTPATIENT
Start: 2018-04-23 | End: 2018-04-23

## 2018-04-23 RX ADMIN — LORAZEPAM 0.5 MG: 2 INJECTION INTRAMUSCULAR at 08:30

## 2018-04-23 RX ADMIN — AMPICILLIN SODIUM AND SULBACTAM SODIUM 3 G: 2; 1 INJECTION, POWDER, FOR SOLUTION INTRAMUSCULAR; INTRAVENOUS at 20:27

## 2018-04-23 RX ADMIN — PANTOPRAZOLE SODIUM 40 MG: 40 TABLET, DELAYED RELEASE ORAL at 08:31

## 2018-04-23 RX ADMIN — AMPICILLIN SODIUM AND SULBACTAM SODIUM 3 G: 2; 1 INJECTION, POWDER, FOR SOLUTION INTRAMUSCULAR; INTRAVENOUS at 16:56

## 2018-04-23 RX ADMIN — METOPROLOL TARTRATE 25 MG: 25 TABLET ORAL at 08:31

## 2018-04-23 RX ADMIN — ENOXAPARIN SODIUM 75 MG: 100 INJECTION SUBCUTANEOUS at 11:30

## 2018-04-23 RX ADMIN — METOPROLOL TARTRATE 25 MG: 25 TABLET ORAL at 20:26

## 2018-04-23 RX ADMIN — KIT FOR THE PREPARATION OF TECHNETIUM TC 99M MEBROFENIN 6 MILLICURIE: 45 INJECTION, POWDER, LYOPHILIZED, FOR SOLUTION INTRAVENOUS at 14:32

## 2018-04-23 RX ADMIN — GADOTERATE MEGLUMINE 14.5 ML: 376.9 INJECTION INTRAVENOUS at 20:15

## 2018-04-23 RX ADMIN — AMIODARONE HYDROCHLORIDE 0.5 MG/MIN: 50 INJECTION, SOLUTION INTRAVENOUS at 02:47

## 2018-04-23 RX ADMIN — SODIUM CHLORIDE: 9 INJECTION, SOLUTION INTRAVENOUS at 02:47

## 2018-04-23 RX ADMIN — ENOXAPARIN SODIUM 75 MG: 100 INJECTION SUBCUTANEOUS at 21:36

## 2018-04-23 RX ADMIN — SODIUM CHLORIDE: 9 INJECTION, SOLUTION INTRAVENOUS at 12:59

## 2018-04-23 RX ADMIN — ONDANSETRON HYDROCHLORIDE 4 MG: 2 INJECTION, SOLUTION INTRAMUSCULAR; INTRAVENOUS at 06:51

## 2018-04-23 RX ADMIN — DOCUSATE SODIUM 100 MG: 100 CAPSULE, LIQUID FILLED ORAL at 08:30

## 2018-04-23 NOTE — CONSULTS
General Surgery Consult    Subjective     Alex Gonzalez is a 73 y.o. male who was admitted for sudden acute chest pain associated with newly diagnosed atrial flutter.  He reports he had attended a [employer] service the night prior to the symptoms beginning and had 2-3 alcoholic beverages.  He felt ill right before bed and then awoke with his heart racing and chest pain and SOB.  In the ER he was diagnosed with A flutter, placed on diltiazem which bottomed his pressure, was then given lopressor and converted to amio gtt.  He is now rate controlled and being followed closely by cardiology.  It was noticed on PE by cardiologist he was tender in the RUQ on palpation and a work up for cholecystitis was started.  He underwent an ultrasound which showed a contracted gallbladder with apparent gallbladder wall thickening and trace pericholecystitic fluid  We were asked to evaluate for biliary disease.      Upon questioning he admits to 2 prior episodes of severe acute abdominal pain since January that lasted a day and then dissipated on its own. During that time he had nausea and decreased appetite. No fever or chills. He doesn't recall if it was post prandial.  He states he definitely had two different locations of pain on admission.  The chest pain has resolved.  The epigastric and RUQ pain are improved but still present.  He denies any fever chills now. He does state he is nauseated and getting anti-emetics for control. He is not hungry.  He has some clear liquids last night and states he was nauseas afterwards.  He has not vomited.  He had his last BM this AM and it was normal.  No weight loss.  No urinary symptoms.  No shortness of breath.    He is scheduled for echo this morning.  He is on full anticoagulation.   Medical History:   Past Medical History:   Diagnosis Date   • Skin cancer        Surgical History:   Past Surgical History:   Procedure Laterality Date   • BASAL CELL CARCINOMA EXCISION         Social History:  "  Occasional ETOH.  No illegal drugs.      Family History:   Family History   Problem Relation Age of Onset   • Atrial fibrillation Sister        Allergies: Patient has no known allergies.    Home Medications: none       Current Medications:  •  acetaminophen, 650 mg, oral, q4h PRN  •  alum-mag hydroxide-simeth, 30 mL, oral, q4h PRN  •  amiodarone (CORDARONE) IV infusion, 0.5 mg/min, intravenous, Continuous  •  glucose, 16-32 g of dextrose, oral, PRN **OR** dextrose, 15-30 g of dextrose, oral, PRN **OR** glucagon, 1 mg, intramuscular, PRN **OR** dextrose in water, 25 mL, intravenous, PRN  •  docusate sodium, 100 mg, oral, BID  •  enoxaparin, 1 mg/kg, subcutaneous, q12h CATALINA  •  ketorolac, 15 mg, intravenous, q6h PRN  •  melatonin, 5 mg, oral, Nightly PRN  •  metoprolol tartrate, 25 mg, oral, BID  •  ondansetron, 4 mg, intravenous, q8h PRN  •  pantoprazole, 40 mg, oral, Daily  •  sodium chloride, , intravenous, Continuous    Review of Systems  ROS otherwise negative except for HPI.     Objective     Physicial Exam  /76 (BP Location: Right upper arm, Patient Position: Sitting)   Pulse (!) 135   Temp 36.4 °C (97.5 °F) (Oral)   Resp 18   Ht 1.803 m (5' 11\")   Wt 76.9 kg (169 lb 9.6 oz)   SpO2 100%   BMI 23.65 kg/m²     General appearance: alert, appears stated age and cooperative  Head: normocephalic, without obvious abnormality, atraumatic  Eyes: PEERL   Neck: no adenopathy   Back: negative, symmetric, no curvature. ROM normal. No CVA tenderness.  Lungs: clear to auscultation bilaterally  Chest wall: no tenderness  Heart: irregular   Abdomen: TTP RUQ to palpation, negative murphys, no hernias appreciated   Extremities: extremities normal, warm and well-perfused; no cyanosis, clubbing, or edema  Pulses: 2+ and symmetric  Skin: Skin color, texture, turgor normal. No rashes or lesions  Lymph nodes: no palpable nodes   Neurologic: AAO x 3, anxious, cranial nerves grossly intact     Labs  Labs reviewed.  " WBC 12.  LFTS pending.      Imaging  I have reviewed the Imaging from the last 24 hrs. U/S abdomen shows contracted gallbladder with possible wall thickening, a 4 mm polyp and trace pericholecystic fluid.     Assessment     RUQ pain, epigastric pain        Plan     Will proceed with HIDA scan to r/o acute cholecystitis.  NPO/IVF. Antiemetics prn.  Hold narcotics for 4 hours prior to HIDA.      CANDACE Ortega     ATTENDING: I saw and examined the patient, and agree with the history and physical as above.  Low suspicion for acute cholecystitis based on history, but will check HIDA scan.

## 2018-04-23 NOTE — PROGRESS NOTES
"Cardiology Daily Progress Note    Subjective    Interval History: Patient feels better overall this morning.  Chest pain and palpitations have resolved.  His breathing is comfortable.  He is still having abdominal discomfort and nausea.  He converted back to sinus rhythm overnight without any conversion pause.    Objective    Vital signs in last 24 hours:  Temp:  [36.4 °C (97.5 °F)-37.1 °C (98.8 °F)] 36.4 °C (97.5 °F)  Heart Rate:  [132-135] 135  Resp:  [18-22] 18  BP: ()/(73-89) 116/76      Intake/Output Summary (Last 24 hours) at 04/23/18 0944  Last data filed at 04/22/18 1500   Gross per 24 hour   Intake              240 ml   Output                0 ml   Net              240 ml       Physical Exam:  /76 (BP Location: Right upper arm, Patient Position: Sitting)   Pulse (!) 135   Temp 36.4 °C (97.5 °F) (Oral)   Resp 18   Ht 1.803 m (5' 11\")   Wt 76.9 kg (169 lb 9.6 oz)   SpO2 100%   BMI 23.65 kg/m²         General:  HEENT: Not in distress.   No JVD   Lungs:     Clear to auscultation bilaterally, respirations unlabored   Chest wall:    No tenderness or deformity   Heart::    Regular rate and rhythm, S1 and S2 normal, no murmur, rub   or gallop   Extremities: No edema seen bilaterally  Abdomen: Soft with mild tenderness to palpation in the right upper quadrant                 •  acetaminophen, 650 mg, oral, q4h PRN  •  alum-mag hydroxide-simeth, 30 mL, oral, q4h PRN  •  amiodarone (CORDARONE) IV infusion, 0.5 mg/min, intravenous, Continuous  •  glucose, 16-32 g of dextrose, oral, PRN **OR** dextrose, 15-30 g of dextrose, oral, PRN **OR** glucagon, 1 mg, intramuscular, PRN **OR** dextrose in water, 25 mL, intravenous, PRN  •  docusate sodium, 100 mg, oral, BID  •  enoxaparin, 1 mg/kg, subcutaneous, q12h CATALINA  •  ketorolac, 15 mg, intravenous, q6h PRN  •  melatonin, 5 mg, oral, Nightly PRN  •  metoprolol tartrate, 25 mg, oral, BID  •  ondansetron, 4 mg, intravenous, q8h PRN  •  pantoprazole, 40 mg, " oral, Daily  •  sodium chloride, , intravenous, Continuous    Labs  Lab Results   Component Value Date    WBC 13.26 (H) 04/23/2018    HGB 12.8 (L) 04/23/2018    HCT 38.6 (L) 04/23/2018     04/23/2018    CHOL 128 04/22/2018    TRIG 38 04/22/2018    HDL 55 04/22/2018     04/23/2018    K 4.6 04/23/2018     (H) 04/23/2018    CREATININE 1.3 04/23/2018    BUN 27 (H) 04/23/2018    CO2 19 (L) 04/23/2018    TSH 3.90 04/23/2018         Imaging  Right upper quadrant ultrasound shows thickened gallbladder with pericholecystic fluid.    ECG/Telemetry  I have independently reviewed the telemetry. Significant findings include Sinus rhythm without any significant atrial ectopy.  He converted from atrial flutter overnight..     Assessment & Plan    Right upper quadrant abdominal pain   Assessment & Plan    Pt with thickened gallbladder and pericholecystic fluid. Getting HIDA scan today. If surgery is needed, he is at acceptable cardiac risk for surgery. Anticoagulation could be held as needed perioperatively. Would continue amiodarone perioperatively if surgery is necessary.        Atrial flutter (CMS/HCC) (HCC)   Assessment & Plan    Pt was in symptomatic 2:1 atrial flutter, converted to SR with improvement in symptoms.   Con't amiodarone infusion for now until question of surgical issues has been resolved.  Con't metoprolol for rate control  [id number] - 2 (age) - consider NOAC for AC  Check echo  TSH/T4        Chest pain   Assessment & Plan    Pain improved with toradol and is resolved now after converting to NSR.  Pericarditis possible but not likely  CT negative for PE  TN minimally elevated at 0.06. Has stayed flat.   CP sounds pleuritic in nature - severe cp with deep breath   EKG without significant ischemic abn  Echo pending today              Expected Discharge Date:  4/24/2018  No discharge date for patient encounter.    Jesus Mcmanus MD  4/23/2018

## 2018-04-23 NOTE — PROGRESS NOTES
Hospital Medicine Service -  Daily Progress Note       SUBJECTIVE   Interval History: Patient seen and examined.   Patient feels improved. Reports anxiety,taking deep breaths helps.  No chest pain,dyspnea,dizziness.  Still has minimal abdominal pain and nausea,no vomitting.  No urinary symptoms   Discussed plan of care with patient  and nurse and cardiology  Total time spent including face to face encounter,discussing plan of care with patient,consultants >30 Mins.         OBJECTIVE      Vital signs in last 24 hours:  Temp:  [36.3 °C (97.4 °F)-37.1 °C (98.8 °F)] 36.3 °C (97.4 °F)  Heart Rate:  [66-67] 67  Resp:  [16-18] 18  BP: ()/(64-76) 115/64    Intake/Output Summary (Last 24 hours) at 04/23/18 1410  Last data filed at 04/22/18 1500   Gross per 24 hour   Intake              240 ml   Output                0 ml   Net              240 ml       PHYSICAL EXAMINATION      Physical Exam   Constitutional: He is oriented to person, place, and time.   Neck: Neck supple.   Cardiovascular: Normal rate and regular rhythm.    No murmur heard.  Pulmonary/Chest: Effort normal.   Abdominal: Soft. Bowel sounds are normal. He exhibits no distension and no mass. There is no guarding.   Musculoskeletal: Normal range of motion. He exhibits no edema.   Neurological: He is alert and oriented to person, place, and time. No cranial nerve deficit. Coordination normal.   Skin: Skin is warm and dry.   Psychiatric:   Very anxious   Nursing note and vitals reviewed.     LABS / IMAGING / TELE      Labs  I have reviewed the patient's labs to the time of note. No new clinical concern.    Results from last 7 days  Lab Units 04/23/18  0305 04/22/18  0153   WBC K/uL 13.26* 12.79*   HEMOGLOBIN g/dL 12.8* 13.4*   HEMATOCRIT % 38.6* 39.7*   PLATELETS K/uL 193 207         Results from last 7 days  Lab Units 04/23/18  0305 04/22/18  0153   SODIUM mmol/L 137 139   POTASSIUM mmol/L 4.6 4.0   CHLORIDE mmol/L 110* 109   CO2 mmol/L 19* 20*   BUN  mg/dL 27* 19   CREATININE mg/dL 1.3 1.2   GLUCOSE mg/dL 159* 116*   CALCIUM mg/dL 8.4* 8.6*       Results from last 7 days  Lab Units 04/23/18  0305 04/23/18  0259 04/22/18  0153   SODIUM mmol/L 137  --  139   POTASSIUM mmol/L 4.6  --  4.0   CHLORIDE mmol/L 110*  --  109   CO2 mmol/L 19*  --  20*   BUN mg/dL 27*  --  19   CREATININE mg/dL 1.3  --  1.2   CALCIUM mg/dL 8.4*  --  8.6*   ALBUMIN g/dL 3.3* 3.5  --    BILIRUBIN TOTAL mg/dL 3.4* 3.3*  --    ALK PHOS IU/L 112 107  --    ALT IU/L 79* 79*  --    AST IU/L 93* 95*  --    GLUCOSE mg/dL 159*  --  116*       Imaging  I have independently reviewed the patient's pertinent imaging for this hospital visit. Significant abnormals are below.  X-ray Chest 1 View    Result Date: 4/22/2018  IMPRESSION: No active disease.     Ct Chest Pulmonary Embolism With Iv Contrast    Result Date: 4/22/2018  IMPRESSION: 1.  Left basilar subsegmental atelectasis. 2.  No evidence of pulmonary embolism to the level of the proximal segmental pulmonary artery. A preliminary report was provided to the Emergency Department by the radiology resident at 3:30 AM on the date of the examination. I certify that I have reviewed this examination and agree with this report. Dinora Bloom MD    Transthoracic Echo (tte) Complete    Addendum Date: 4/23/2018    · Mild mitral valve regurgitation. Normal leaflet structure. · Trace aortic valve regurgitation. · Mild tricuspid valve regurgitation with RVSP of 26 mmHg. · Normal-sized left ventricular cavity. Mildly decreased systolic function. Estimated EF = 45%. Diastolic dysfunction consistent with restrictive physiology. Abnormal septal wall motion. · Mildly dilated left atrium. · Mildly dilated right ventricular cavity with mildly reduced systolic function. · Mildly dilated left atrium. The atrial septum is thin. There is no evidence of shunting by color flow doppler or aggitated saline injection. · Severely dilated right atrium.      Ultrasound Abdomen  Limited    Result Date: 4/22/2018  IMPRESSION: 1. The gallbladder is contracted, limiting evaluation, demonstrates apparent wall thickening, measuring up to 5 mm. There is a 4 mm gallbladder polyp and trace pericholecystic fluid is present. If there is ongoing clinical concern for acute cholecystitis, hepatobiliary imaging may be considered for further evaluation. 2. Trace right pleural effusion. I certify that I have reviewed this examination and agree with this report. Dr. Jesus Esquivel MD        ECG/Telemetry  I have independently reviewed the telemetry. Significant findings include sinus controlled.    ASSESSMENT AND PLAN      Atrial flutter (CMS/HCC) (HCC)   Assessment & Plan    -Stopped the Cardizem drip since the heart rate is now in the 60s  -Converted to sinus rhythm  -cardiology following  -Continue amiodarone while n.p.o. and metoprolol.  -Continue Lovenox  ECHO pending            Right upper quadrant abdominal pain   Assessment & Plan    Pain started in the middle of the night.  Has pain in the epigastric in the right upper quadrant.  Concern for pericarditis vs GI issues  Ct  Chest with no pericardial effusion     right upper quadrant ultrasound with wall thickening as well as trace pericholecystic fluid.  Amylase within normal limits  Check HIDA scan  Consult surgery  Pain  Control-2 new Toradol   Continue Protonix           Chest pain   Assessment & Plan    With atypical features.  Troponin 0 0.5-0.6  EKG nonischemic.  Concern for pericarditis.  Toradol helped with the pain.  Echocardiogram pending  Cardiology following        Leukocytosis   Assessment & Plan    Likely reactive  Afebrile    Follow         Anxiety   Assessment & Plan      -Ativan if needed             VTE Assessment: [place] VTE Score: 1  VTE Prophylaxis Plan: lovenox  Code Status: Full Code  Estimated Discharge Date: 4/24/2018  Disposition Planning: home in 2-3 days     Lucinda Tran MD  4/23/2018

## 2018-04-23 NOTE — NURSING NOTE
Pt ate after returning from us abd. At first felt ok but about 30 mins later felt nauseated and like he was going to have diarrhea. Sitting in br. Given zofran iv per order. Did not want to come out of br just yet-advised to call if he felt worse. Will monitor.

## 2018-04-23 NOTE — PLAN OF CARE
Problem: Pain, Acute (Adult)  Goal: Acceptable Pain Control/Comfort Level  Outcome: Ongoing (interventions implemented as appropriate)      Problem: Fall Risk (Adult)  Goal: Identify Related Risk Factors and Signs and Symptoms  Outcome: Ongoing (interventions implemented as appropriate)    Goal: Absence of Falls  Outcome: Ongoing (interventions implemented as appropriate)

## 2018-04-23 NOTE — PLAN OF CARE
Problem: Pain, Acute (Adult)  Intervention: Monitor/Manage Analgesia   04/23/18 0527   Safety Interventions   Medication Review/Management medications reviewed       Goal: Acceptable Pain Control/Comfort Level  Outcome: Ongoing (interventions implemented as appropriate)

## 2018-04-23 NOTE — NURSING NOTE
Pt resting quietly in bed-no emesis or diarrhea. Improved with zofran. Tele afl 103. Will monitor.

## 2018-04-24 PROBLEM — R74.8 ABNORMAL LIVER ENZYMES: Status: ACTIVE | Noted: 2018-04-24

## 2018-04-24 PROBLEM — I42.9 CARDIOMYOPATHY (CMS/HCC): Status: ACTIVE | Noted: 2018-04-24

## 2018-04-24 LAB
ABO + RH BLD: NORMAL
ALBUMIN SERPL-MCNC: 2.9 G/DL (ref 3.4–5)
ALP SERPL-CCNC: 87 IU/L (ref 35–126)
ALT SERPL-CCNC: 254 IU/L (ref 16–63)
AMORPH URATE CRY URNS QL MICRO: ABNORMAL /HPF
ANION GAP SERPL CALC-SCNC: 7 MEQ/L (ref 3–15)
AST SERPL-CCNC: 270 IU/L (ref 15–41)
BACTERIA URNS QL MICRO: ABNORMAL /UL
BASOPHILS # BLD: 0.07 K/UL (ref 0.01–0.1)
BASOPHILS NFR BLD: 0.6 %
BILIRUB DIRECT SERPL-MCNC: 0.7 MG/DL
BILIRUB SERPL-MCNC: 1.5 MG/DL (ref 0.3–1.2)
BILIRUB UR QL STRIP.AUTO: 1 MG/DL
BLD GP AB SCN SERPL QL: NEGATIVE
BUN SERPL-MCNC: 29 MG/DL (ref 8–20)
CALCIUM SERPL-MCNC: 8 MG/DL (ref 8.9–10.3)
CHLORIDE SERPL-SCNC: 112 MMOL/L (ref 98–109)
CLARITY UR REFRACT.AUTO: CLEAR
CO2 SERPL-SCNC: 21 MMOL/L (ref 22–32)
COLOR UR AUTO: ABNORMAL
CREAT SERPL-MCNC: 1.3 MG/DL (ref 0.8–1.3)
D AG BLD QL: POSITIVE
DIFFERENTIAL METHOD BLD: ABNORMAL
EOSINOPHIL # BLD: 0.39 K/UL (ref 0.04–0.54)
EOSINOPHIL NFR BLD: 3.4 %
ERYTHROCYTE [DISTWIDTH] IN BLOOD BY AUTOMATED COUNT: 13.9 % (ref 11.6–14.4)
GFR SERPL CREATININE-BSD FRML MDRD: 54 ML/MIN/1.73M*2
GLUCOSE SERPL-MCNC: 98 MG/DL (ref 70–99)
GLUCOSE UR STRIP.AUTO-MCNC: NEGATIVE MG/DL
HCT VFR BLDCO AUTO: 35.8 % (ref 40–51)
HGB BLD-MCNC: 11.8 G/DL (ref 13.7–17.5)
HGB UR QL STRIP.AUTO: NEGATIVE
HYALINE CASTS #/AREA URNS LPF: ABNORMAL /LPF
IMM GRANULOCYTES # BLD AUTO: 0.06 K/UL (ref 0–0.08)
IMM GRANULOCYTES NFR BLD AUTO: 0.5 %
INR PPP: 1.5 INR
KETONES UR STRIP.AUTO-MCNC: ABNORMAL MG/DL
LABORATORY COMMENT REPORT: NORMAL
LEUKOCYTE ESTERASE UR QL STRIP.AUTO: NEGATIVE
LYMPHOCYTES # BLD: 2.37 K/UL (ref 1.2–3.5)
LYMPHOCYTES NFR BLD: 20.4 %
MCH RBC QN AUTO: 33.1 PG (ref 28–33.2)
MCHC RBC AUTO-ENTMCNC: 33 G/DL (ref 32.2–36.5)
MCV RBC AUTO: 100.6 FL (ref 83–98)
MONOCYTES # BLD: 1.31 K/UL (ref 0.3–1)
MONOCYTES NFR BLD: 11.3 %
NEUTROPHILS # BLD: 7.4 K/UL (ref 1.7–7)
NEUTS SEG NFR BLD: 63.8 %
NITRITE UR QL STRIP.AUTO: NEGATIVE
NRBC BLD-RTO: 0 %
PDW BLD AUTO: 11.1 FL (ref 9.4–12.4)
PH UR STRIP.AUTO: 5.5 [PH]
PLATELET # BLD AUTO: 187 K/UL (ref 150–350)
POTASSIUM SERPL-SCNC: 4.6 MMOL/L (ref 3.6–5.1)
PROT SERPL-MCNC: 5.4 G/DL (ref 6–8.2)
PROT UR QL STRIP.AUTO: ABNORMAL
PROTHROMBIN TIME: 18.3 SEC. (ref 12.2–14.5)
RBC # BLD AUTO: 3.56 M/UL (ref 4.5–5.8)
RBC #/AREA URNS HPF: ABNORMAL /HPF
SODIUM SERPL-SCNC: 140 MMOL/L (ref 136–144)
SP GR UR REFRACT.AUTO: >1.035
SQUAMOUS URNS QL MICRO: ABNORMAL /HPF
UROBILINOGEN UR STRIP-ACNC: 1 EU/DL
WBC # BLD AUTO: 11.6 K/UL (ref 3.8–10.5)
WBC #/AREA URNS HPF: ABNORMAL /HPF

## 2018-04-24 PROCEDURE — 93005 ELECTROCARDIOGRAM TRACING: CPT | Performed by: HOSPITALIST

## 2018-04-24 PROCEDURE — 80074 ACUTE HEPATITIS PANEL: CPT | Performed by: HOSPITALIST

## 2018-04-24 PROCEDURE — 85025 COMPLETE CBC W/AUTO DIFF WBC: CPT | Performed by: PHYSICIAN ASSISTANT

## 2018-04-24 PROCEDURE — 82248 BILIRUBIN DIRECT: CPT | Performed by: PHYSICIAN ASSISTANT

## 2018-04-24 PROCEDURE — 63700000 HC SELF-ADMINISTRABLE DRUG: Performed by: HOSPITALIST

## 2018-04-24 PROCEDURE — 86038 ANTINUCLEAR ANTIBODIES: CPT | Performed by: PHYSICIAN ASSISTANT

## 2018-04-24 PROCEDURE — 86901 BLOOD TYPING SEROLOGIC RH(D): CPT

## 2018-04-24 PROCEDURE — 81003 URINALYSIS AUTO W/O SCOPE: CPT | Performed by: HOSPITALIST

## 2018-04-24 PROCEDURE — 25000000 HC PHARMACY GENERAL: Performed by: HOSPITALIST

## 2018-04-24 PROCEDURE — 99232 SBSQ HOSP IP/OBS MODERATE 35: CPT | Performed by: SURGERY

## 2018-04-24 PROCEDURE — 63600000 HC DRUGS/DETAIL CODE: Performed by: PHYSICIAN ASSISTANT

## 2018-04-24 PROCEDURE — 86255 FLUORESCENT ANTIBODY SCREEN: CPT | Performed by: PHYSICIAN ASSISTANT

## 2018-04-24 PROCEDURE — 86039 ANTINUCLEAR ANTIBODIES (ANA): CPT | Performed by: PHYSICIAN ASSISTANT

## 2018-04-24 PROCEDURE — 25800000 HC PHARMACY IV SOLUTIONS: Performed by: HOSPITALIST

## 2018-04-24 PROCEDURE — 85610 PROTHROMBIN TIME: CPT | Performed by: PHYSICIAN ASSISTANT

## 2018-04-24 PROCEDURE — 36415 COLL VENOUS BLD VENIPUNCTURE: CPT | Performed by: PHYSICIAN ASSISTANT

## 2018-04-24 PROCEDURE — 99233 SBSQ HOSP IP/OBS HIGH 50: CPT | Performed by: HOSPITALIST

## 2018-04-24 PROCEDURE — 21400000 HC ROOM AND CARE CCU/INTERMEDIATE

## 2018-04-24 PROCEDURE — 86235 NUCLEAR ANTIGEN ANTIBODY: CPT | Performed by: PHYSICIAN ASSISTANT

## 2018-04-24 PROCEDURE — 63700000 HC SELF-ADMINISTRABLE DRUG: Performed by: PHYSICIAN ASSISTANT

## 2018-04-24 PROCEDURE — 80053 COMPREHEN METABOLIC PANEL: CPT | Performed by: PHYSICIAN ASSISTANT

## 2018-04-24 RX ORDER — DILTIAZEM HCL IN NACL,ISO-OSM 125 MG/125
5 PLASTIC BAG, INJECTION (ML) INTRAVENOUS
Status: DISCONTINUED | OUTPATIENT
Start: 2018-04-24 | End: 2018-04-26

## 2018-04-24 RX ORDER — DILTIAZEM HYDROCHLORIDE 5 MG/ML
5 INJECTION INTRAVENOUS ONCE
Status: COMPLETED | OUTPATIENT
Start: 2018-04-24 | End: 2018-04-24

## 2018-04-24 RX ADMIN — Medication 5 MG/HR: at 19:44

## 2018-04-24 RX ADMIN — METOPROLOL TARTRATE 25 MG: 25 TABLET ORAL at 19:54

## 2018-04-24 RX ADMIN — PANTOPRAZOLE SODIUM 40 MG: 40 TABLET, DELAYED RELEASE ORAL at 08:32

## 2018-04-24 RX ADMIN — Medication 5 MG/HR: at 19:46

## 2018-04-24 RX ADMIN — SODIUM CHLORIDE: 9 INJECTION, SOLUTION INTRAVENOUS at 23:47

## 2018-04-24 RX ADMIN — DOCUSATE SODIUM 100 MG: 100 CAPSULE, LIQUID FILLED ORAL at 19:52

## 2018-04-24 RX ADMIN — METOPROLOL TARTRATE 25 MG: 25 TABLET ORAL at 08:31

## 2018-04-24 RX ADMIN — ENOXAPARIN SODIUM 75 MG: 100 INJECTION SUBCUTANEOUS at 10:24

## 2018-04-24 RX ADMIN — SODIUM CHLORIDE: 9 INJECTION, SOLUTION INTRAVENOUS at 01:39

## 2018-04-24 RX ADMIN — SODIUM CHLORIDE: 9 INJECTION, SOLUTION INTRAVENOUS at 12:40

## 2018-04-24 RX ADMIN — SODIUM CHLORIDE: 9 INJECTION, SOLUTION INTRAVENOUS at 19:43

## 2018-04-24 RX ADMIN — DILTIAZEM HYDROCHLORIDE 5 MG: 5 INJECTION INTRAVENOUS at 19:51

## 2018-04-24 RX ADMIN — AMPICILLIN SODIUM AND SULBACTAM SODIUM 3 G: 2; 1 INJECTION, POWDER, FOR SOLUTION INTRAMUSCULAR; INTRAVENOUS at 16:06

## 2018-04-24 RX ADMIN — AMPICILLIN SODIUM AND SULBACTAM SODIUM 3 G: 2; 1 INJECTION, POWDER, FOR SOLUTION INTRAMUSCULAR; INTRAVENOUS at 22:33

## 2018-04-24 RX ADMIN — ENOXAPARIN SODIUM 75 MG: 100 INJECTION SUBCUTANEOUS at 19:53

## 2018-04-24 RX ADMIN — AMPICILLIN SODIUM AND SULBACTAM SODIUM 3 G: 2; 1 INJECTION, POWDER, FOR SOLUTION INTRAMUSCULAR; INTRAVENOUS at 08:33

## 2018-04-24 RX ADMIN — AMPICILLIN SODIUM AND SULBACTAM SODIUM 3 G: 2; 1 INJECTION, POWDER, FOR SOLUTION INTRAMUSCULAR; INTRAVENOUS at 03:42

## 2018-04-24 NOTE — NURSING NOTE
Spoke to dr vogt patient converted to rapid afib at 1815. She ordered cardizem. Took vitals at 1840pm. Patient is asymptomatic

## 2018-04-24 NOTE — ASSESSMENT & PLAN NOTE
EF ~45 with mild BiV dysfunction and severe diastolic dysfunction  Dilated RA    ?restrictive cardiomyopathy-?cause  RA dilatation possibly from increased venous pressure  Pleural effusions/ascites/anasarca    Plan:  Follow volume status  BB.  Would keep on low dose Lasix  Check cardiac MRI as outpt and office followup       Office follow up with Dr. Pérez 5/7 at 9:15 am       Prescription will be provided for cardiac MRI along with number for scheduling at [facility].       .

## 2018-04-24 NOTE — ASSESSMENT & PLAN NOTE
EF ~45 with mild BiV dysfunction and severe diastolic dysfunction  Dilated RA     Possibly tachy mediated from afib  RA dilatation possibly from increased venous pressure  Pleural effusions/ascites/anasarca     Plan:  Follow volume status  Consider cardiac MRI as outpt if no etiology found  BB.  T/c low dose ACEI  Repeat echo in 1 month.

## 2018-04-24 NOTE — ASSESSMENT & PLAN NOTE
73 y M with no significant PMH presenting for abnormal LFTs, ?nausea, with prior report of abd pain after US revealing thickening GB wall, negative HIDA, MRI revealing normal ducts without acute abnormality; presented initially for pleuritic type chest pain, found to be in a flutter --> echo revealing dilated Ra, ?liver pathology at this time but no clot in venous system as evidence of MRI; no surgical intervention planned, no WBC, afebrile; ?cardiac etiology causing 2/2 liver abnormalities as evidence of labs; no etoh abuse, new meds, recent ABX  -4/25: pt's AST + ALT up to 500 from 200s; t bili normal, no AI or infectious serologies back; abd slightly more distended; went into rapid A-fib and on drip now; pt frustrated about not knowing what's wrong, symtomatically improved in regards to nausea, abd pain; nontender abdomen, afebrile, +ascites on exam; will consider ?IR to remove fluid; discussed with cardiology -- try gentle diuresis,?restrictive cardiomyopathy; not going home in setting of rapi a-fib on drip and rising LFTs  -4/26: pt had US to eval amount of ascites last night, no ascites on this exam --> likely diuresed after Lsaix yetserday; this am abd soft, no new symptoms or recurrent symptoms; AST down in 300s, ALT same; heps, AI negative serologies, pending Coxsackie and other viral panels; suspect primary cardiac source -- > cardiology recommending low dose Lasix outpatient; hopeful would be able to tap to get SAAG; from GI stand point does not need to stay in hospital for LFTs to normalize, they are going in right direction; believe we atleast have the culprit at hand; can f/u outpatient for further studies, do not suspect hemochromatosis --> DNA marker pending, IBC normal, ferritin elevated but to be expected in this case  -4/27: LFTs improving with continued lasix --> suspect cardiac source but KELSEY+, f/u with PCP; LFTs 1 wk from DC    Recommendations:    - Trend LFTs outpatient   - After DC would have  LFTs checked 1 wk s/p DC to check for improvement; as of now no apparent , infectious causes  - Continue low res diet   - F/u outpatient given +KELSEY with PCP  - Continue lasix recs per cardiology and cardiology f/u outpatient; suspect this as primary inciting cause for ?cardiomyopathy of some sort --> restrictive (?), coxsackie, other viral pathogens  - Ok for DC from GI stand point; Lfts decreasing appropriately

## 2018-04-24 NOTE — PROGRESS NOTES
Hospital Medicine Service -  Daily Progress Note       SUBJECTIVE   Interval History: Patient seen and examined. Wife present in room with patient.  Not clear with what is going on.  Patient feels improved. Reports no pain except on palpation,has been NPO  No chest pain,dyspnea,dizziness.  No abdominal pain,nausea,vomititng or diarrhea.  No urinary symptoms   Discussed plan of care with patient  And wife and nurse and Dr Mcmanus  Total time spent including face to face encounter,discussing plan of care with patient,family,consultants >30 Mins.         OBJECTIVE      Vital signs in last 24 hours:  Temp:  [36.2 °C (97.1 °F)-37 °C (98.6 °F)] 36.6 °C (97.9 °F)  Heart Rate:  [64-69] 64  Resp:  [18] 18  BP: (115-128)/(63-74) 122/71    Intake/Output Summary (Last 24 hours) at 04/24/18 1150  Last data filed at 04/24/18 1022   Gross per 24 hour   Intake             1500 ml   Output                0 ml   Net             1500 ml       PHYSICAL EXAMINATION      Physical Exam   Constitutional: He is oriented to person, place, and time.   Neck: Neck supple.   Cardiovascular: Normal rate and regular rhythm.    Pulmonary/Chest: Effort normal and breath sounds normal.   Abdominal: Soft. Bowel sounds are normal. There is tenderness.   Epigastrium region    Musculoskeletal: Normal range of motion. He exhibits no edema.   Neurological: He is alert and oriented to person, place, and time.   Skin: Skin is warm and dry.   Psychiatric: His behavior is normal.   Nursing note and vitals reviewed.     LABS / IMAGING / TELE      Labs  I have reviewed the patient's pertinent labs.  Significant abnormals are below.    Results from last 7 days  Lab Units 04/24/18  0453 04/23/18  0305 04/23/18  0259 04/22/18  0153   SODIUM mmol/L 140 137  --  139   POTASSIUM mmol/L 4.6 4.6  --  4.0   CHLORIDE mmol/L 112* 110*  --  109   CO2 mmol/L 21* 19*  --  20*   BUN mg/dL 29* 27*  --  19   CREATININE mg/dL 1.3 1.3  --  1.2   CALCIUM mg/dL 8.0* 8.4*  --  8.6*    ALBUMIN g/dL 2.9* 3.3* 3.5  --    BILIRUBIN TOTAL mg/dL 1.5* 3.4* 3.3*  --    ALK PHOS IU/L 87 112 107  --    ALT IU/L 254* 79* 79*  --    AST IU/L 270* 93* 95*  --    GLUCOSE mg/dL 98 159*  --  116*       Results from last 7 days  Lab Units 04/24/18  0453 04/23/18  0305 04/22/18  0153   WBC K/uL 11.60* 13.26* 12.79*   HEMOGLOBIN g/dL 11.8* 12.8* 13.4*   HEMATOCRIT % 35.8* 38.6* 39.7*   PLATELETS K/uL 187 193 207         Imaging  I have independently reviewed the patient's pertinent imaging for this hospital visit. Significant abnormals are below.  Nm Hepatobiliary    Result Date: 4/23/2018  IMPRESSION: Normal examination. I certify that I have reviewed this examination and agree with this report. Yamilex Daniel MD    X-ray Chest 1 View    Result Date: 4/22/2018  IMPRESSION: No active disease.     Ct Chest Pulmonary Embolism With Iv Contrast    Result Date: 4/22/2018  IMPRESSION: 1.  Left basilar subsegmental atelectasis. 2.  No evidence of pulmonary embolism to the level of the proximal segmental pulmonary artery. A preliminary report was provided to the Emergency Department by the radiology resident at 3:30 AM on the date of the examination. I certify that I have reviewed this examination and agree with this report. Dinora Bloom MD    Mri Abdomen With And Without Contrast Mrcp    Result Date: 4/23/2018  IMPRESSION: 1.  Third spacing with moderate ascites, partially imaged pleural effusions, anasarca and periportal edema. 2.  No evidence of choledocholithiasis or obstructive biliopathy.     Transthoracic Echo (tte) Complete    Addendum Date: 4/23/2018    · Mild mitral valve regurgitation. Normal leaflet structure. · Trace aortic valve regurgitation. · Mild tricuspid valve regurgitation with RVSP of 26 mmHg. · Normal-sized left ventricular cavity. Mildly decreased systolic function. Estimated EF = 45%. Diastolic dysfunction consistent with restrictive physiology. Abnormal septal wall motion. · Mildly  dilated left atrium. · Mildly dilated right ventricular cavity with mildly reduced systolic function. · Mildly dilated left atrium. The atrial septum is thin. There is no evidence of shunting by color flow doppler or aggitated saline injection. · Severely dilated right atrium.      Ultrasound Abdomen Limited    Result Date: 4/22/2018  IMPRESSION: 1. The gallbladder is contracted, limiting evaluation, demonstrates apparent wall thickening, measuring up to 5 mm. There is a 4 mm gallbladder polyp and trace pericholecystic fluid is present. If there is ongoing clinical concern for acute cholecystitis, hepatobiliary imaging may be considered for further evaluation. 2. Trace right pleural effusion. I certify that I have reviewed this examination and agree with this report. Dr. Jesus Esquivel MD      ECG/Telemetry  I have independently reviewed the telemetry. No events for the last 24 hours.    ASSESSMENT AND PLAN      Abnormal liver enzymes   Assessment & Plan    AST ALT trending up and bilirubin trending down.  Abdomen ultrasound with some gallbladder thickening and trace pericholecystic fluid.  MRI abdomen with no evidence of choledochal lithiasis or obstructive uropathy.  Third spacing with moderate ascites.    Check hepatitis panel  Consult GI        Atrial flutter (CMS/HCC) (HCC)   Assessment & Plan    Pt was in symptomatic 2:1 atrial flutter, converted to SR with improvement in symptoms.   [id number] - 2 (age) - consider NOAC for AC- hold for now pending liver issues, INR 1.5  Echo with severely dilated RA  TSH/T4 Pending     Plan:  No further amio with elevated LFTs, continue lopressor 25 bid  Hold on OAC for now- on full dose lovenox.    Continue tele   F/u TSH            Cardiomyopathy (CMS/HCC) (HCC)   Assessment & Plan    EF ~45 with mild BiV dysfunction and severe diastolic dysfunction  Dilated RA     Possibly tachy mediated from afib  RA dilatation possibly from increased venous pressure  Pleural  effusions/ascites/anasarca     Plan:  Follow volume status  BB.  T/c low dose ACEI  Repeat echo in 1 month.             Right upper quadrant abdominal pain   Assessment & Plan    MRI noted  HIDA negative  LFTs rising, Tbili trending down  Improved -s/p toradol        Plan:  Consult GI  Surgery following-no plans for surgery   Advance diet.          Chest pain   Assessment & Plan    Pain improved with toradol and is resolved now after converting to NSR.  Pericarditis possible but not likely- no pericardial abnormality on Echo   CT negative for PE  TN minimally elevated at 0.06. Has stayed flat.   EKG without significant ischemic abn  HIDA negative, MRI abdomen with ascites/periportal edema ,         Plan:  GI work up in progress  ?doppler US portal veins  Eventual outpt ischemic eval         Leukocytosis   Assessment & Plan    Likely reactive  Afebrile    Follow         Anxiety   Assessment & Plan      -Ativan if needed             VTE Assessment: [place] VTE Score: 1  VTE Prophylaxis Plan: lovenox  Code Status: Full Code  Estimated Discharge Date: 4/25/2018  Disposition Planning: home     Lucinda Tran MD  4/25/2018

## 2018-04-24 NOTE — PROGRESS NOTES
"Daily Progress Note    Subjective    Interval History: Remains in sinus.  Case d/w [facility]    Objective  Vital signs in last 24 hours:  Temp:  [36.2 °C (97.1 °F)-37 °C (98.6 °F)] 36.6 °C (97.9 °F)  Heart Rate:  [64-69] 64  Resp:  [18] 18  BP: (115-128)/(63-74) 122/71      Intake/Output Summary (Last 24 hours) at 04/24/18 0958  Last data filed at 04/24/18 0440   Gross per 24 hour   Intake             1400 ml   Output                0 ml   Net             1400 ml       Physical Exam:  /71 (BP Location: Left upper arm, Patient Position: Lying)   Pulse 64   Temp 36.6 °C (97.9 °F) (Oral)   Resp 18   Ht 1.803 m (5' 11\")   Wt 77.2 kg (170 lb 3.2 oz)   SpO2 99%   BMI 23.74 kg/m²         Gen:  HEENT: AAO x3, NAD     No JVD, No carotid bruits, No obvious thyromegaly     Lungs:   Clear to auscultation bilaterally, respirations unlabored   Chest wall:  No tenderness or deformity   Heart:  Regular rate and rhythm, S1 and S2 normal, no murmur, rub   or gallop   Abdomen:   Extremities: Soft, non-tender, normal bowel sounds  No edema seen bilaterally, warm    Neuro:  Grossly nonfocal             •  acetaminophen, 650 mg, oral, q4h PRN  •  alum-mag hydroxide-simeth, 30 mL, oral, q4h PRN  •  ampicillin-sulbactam, 3 g, intravenous, q6h INT  •  glucose, 16-32 g of dextrose, oral, PRN **OR** dextrose, 15-30 g of dextrose, oral, PRN **OR** glucagon, 1 mg, intramuscular, PRN **OR** dextrose in water, 25 mL, intravenous, PRN  •  docusate sodium, 100 mg, oral, BID  •  enoxaparin, 1 mg/kg, subcutaneous, q12h CATALINA  •  ketorolac, 15 mg, intravenous, q6h PRN  •  melatonin, 5 mg, oral, Nightly PRN  •  metoprolol tartrate, 25 mg, oral, BID  •  ondansetron, 4 mg, intravenous, q8h PRN  •  pantoprazole, 40 mg, oral, Daily  •  sodium chloride, , intravenous, Continuous    Labs     Results from last 7 days  Lab Units 04/24/18  0453 04/23/18  0305 04/22/18  0153   WBC K/uL 11.60* 13.26* 12.79*   HEMOGLOBIN g/dL 11.8* 12.8* 13.4*   HEMATOCRIT % " 35.8* 38.6* 39.7*   PLATELETS K/uL 187 193 207       Results from last 7 days  Lab Units 04/24/18  0453 04/23/18  0305 04/23/18  0259 04/22/18  0153   SODIUM mmol/L 140 137  --  139   POTASSIUM mmol/L 4.6 4.6  --  4.0   CHLORIDE mmol/L 112* 110*  --  109   CO2 mmol/L 21* 19*  --  20*   BUN mg/dL 29* 27*  --  19   CREATININE mg/dL 1.3 1.3  --  1.2   CALCIUM mg/dL 8.0* 8.4*  --  8.6*   ALBUMIN g/dL 2.9* 3.3* 3.5  --    BILIRUBIN TOTAL mg/dL 1.5* 3.4* 3.3*  --    ALK PHOS IU/L 87 112 107  --    ALT IU/L 254* 79* 79*  --    AST IU/L 270* 93* 95*  --    GLUCOSE mg/dL 98 159*  --  116*       Results from last 7 days  Lab Units 04/23/18  0259 04/22/18  1309 04/22/18  0923   TROPONIN I ng/mL 0.05* 0.06* 0.05*       Results from last 7 days  Lab Units 04/24/18  0454   INR INR 1.5           Results from last 7 days  Lab Units 04/22/18  0923   CHOLESTEROL mg/dL 128   TRIGLYCERIDES mg/dL 38   HDL mg/dL 55   LDL CALC mg/dL 65       Imaging  Nm Hepatobiliary    Result Date: 4/23/2018  IMPRESSION: Normal examination. I certify that I have reviewed this examination and agree with this report. Yamilex Daniel MD    Mri Abdomen With And Without Contrast Mrcp    Result Date: 4/23/2018  IMPRESSION: 1.  Third spacing with moderate ascites, partially imaged pleural effusions, anasarca and periportal edema. 2.  No evidence of choledocholithiasis or obstructive biliopathy.     ECG   I have personally reviewed ECG      Telemetry  I have personally reviewed telemetry  Sinus, no alarms     Echocardiogram  Transthoracic Echo (tte) Complete    Addendum Date: 4/23/2018    · Mild mitral valve regurgitation. Normal leaflet structure. · Trace aortic valve regurgitation. · Mild tricuspid valve regurgitation with RVSP of 26 mmHg. · Normal-sized left ventricular cavity. Mildly decreased systolic function. Estimated EF = 45%. Diastolic dysfunction consistent with restrictive physiology. Abnormal septal wall motion. · Mildly dilated left atrium. ·  Mildly dilated right ventricular cavity with mildly reduced systolic function. · Mildly dilated left atrium. The atrial septum is thin. There is no evidence of shunting by color flow doppler or aggitated saline injection. · Severely dilated right atrium.           Assessment & Plan    Atrial flutter (CMS/HCC) (HCC)   Assessment & Plan    Pt was in symptomatic 2:1 atrial flutter, converted to SR with improvement in symptoms.   [id number] - 2 (age) - consider NOAC for AC- hold for now pending liver issues, INR 1.5  Echo with severely dilated RA  TSH/T4 Pending    Plan:  No further amio with elevated LFTs, continue lopressor 25 bid  Hold on OAC for now- on full dose lovenox.    Continue tele   F/u TSH          Cardiomyopathy (CMS/HCC) (HCC)   Assessment & Plan    EF ~45 with mild BiV dysfunction and severe diastolic dysfunction  Dilated RA    Possibly tachy mediated from afib  RA dilatation possibly from increased venous pressure  Pleural effusions/ascites/anasarca    Plan:  Follow volume status  BB.  T/c low dose ACEI  Repeat echo in 1 month.                    Right upper quadrant abdominal pain   Assessment & Plan    MRI noted  HIDA negative  LFTs rising, Tbili trending down  See below      Plan:  GI eval         Chest pain   Assessment & Plan    Pain improved with toradol and is resolved now after converting to NSR.  Pericarditis possible but not likely- no pericardial abnormality on Echo   CT negative for PE  TN minimally elevated at 0.06. Has stayed flat.   EKG without significant ischemic abn  HIDA negative, MRI abdomen with ascites/periportal edema ,       Plan:  GI work up in progress  ?doppler US portal veins  Eventual outpt ischemic eval             This is Lynn Richard PA-C acting as scribe for CANDACE Santos  4/24/2018

## 2018-04-24 NOTE — ASSESSMENT & PLAN NOTE
AST ALT trending up (2 fold today) and bilirubin trending down.  Abdomen ultrasound with some gallbladder thickening and trace pericholecystic fluid.  MRI abdomen with no evidence of choledochal lithiasis or obstructive uropathy.  Third spacing with moderate ascites.    Check hepatitis panel  GI Consult appreciated  Cardiology consult appreciated  Further evaluation in progress to establish liver disease vs cardiac disease

## 2018-04-24 NOTE — PLAN OF CARE
Problem: Patient Care Overview  Goal: Plan of Care Review  Outcome: Ongoing (interventions implemented as appropriate)      Problem: Pain, Acute (Adult)  Goal: Acceptable Pain Control/Comfort Level  Outcome: Outcome(s) Achieved Date Met: 04/24/18      Problem: Fall Risk (Adult)  Goal: Identify Related Risk Factors and Signs and Symptoms  Outcome: Ongoing (interventions implemented as appropriate)    Goal: Absence of Falls  Outcome: Ongoing (interventions implemented as appropriate)

## 2018-04-24 NOTE — PROGRESS NOTES
General Surgery Daily Progress Note    Subjective     Interval History: KAYLEIGH O/N. No more CP/SOB or feelings of palpitations. Hungry. Abdominal pain is improving as well. 1BM yesterday       Objective     Vital signs in last 24 hours:  Temp:  [36.2 °C (97.1 °F)-37 °C (98.6 °F)] 36.4 °C (97.5 °F)  Heart Rate:  [64-69] 64  Resp:  [16-18] 18  BP: (114-120)/(63-74) 120/72    No intake or output data in the 24 hours ending 04/24/18 0318  Intake/Output this shift:  No intake/output data recorded.    Physical Exam    General appearance: alert, appears stated age and cooperative  Head: normocephalic, without obvious abnormality, atraumatic  Eyes: PEERL   Neck: no adenopathy   Back: negative, symmetric, no curvature. ROM normal. No CVA tenderness.  Lungs: clear to auscultation bilaterally  Chest wall: no tenderness  Heart: irregular   Abdomen: TTP RUQ to palpation - improved from yesterday, negative murphys, no hernias appreciated   Extremities: extremities normal, warm and well-perfused; no cyanosis, clubbing, or edema  Pulses: 2+ and symmetric  Skin: Skin color, texture, turgor normal. No rashes or lesions  Lymph nodes: no palpable nodes   Neurologic: AAO x 3, anxious, cranial nerves grossly intact     VTE Assessment: I have reassessed and the patient's VTE risk and treatment plan is appropriate.    Labs  Labs are pending.    Imaging  HIDA - normal GB visualization  MRCP - 1.  Third spacing with moderate ascites, partially imaged pleural effusions,  anasarca and periportal edema.  2.  No evidence of choledocholithiasis or obstructive biliopathy.      Assessment/Plan       73 year old male admitted with palpitations, found to be in atrial flutter, also with elevated LFT's and concerns for biliary pathology    -f/u AM labs  -Keep NPO for now  -Pain control  -Will discuss any potential further interventions or workup for biliary pathology    Porter Abdul MD     ATTENDING: Patient denies complaints.  No chest pain.  No  abdominal pain.  Hungry.  Positive flatus and BM.  No shortness of breath.  AVSS.  Abdomen soft, minimally tender right upper quadrant, nondistended.  No jaundice noted.  Transaminases, bilirubin down, alk phos normal.  WBC 11.6 from 13.3.  HIDA reviewed, negative for acute cholecystitis.  MRCP reviewed, some sludge in gallbladder, but no choledocholithiasis.  I believe all his symptoms are from right heart failure.  I do not think there is a gallbladder or biliary problem here.  No plans for surgical intervention.  Can have a regular diet.

## 2018-04-24 NOTE — PLAN OF CARE
Problem: Patient Care Overview  Goal: Plan of Care Review  Outcome: Ongoing (interventions implemented as appropriate)   04/24/18 0414   Coping/Psychosocial   Plan Of Care Reviewed With patient;spouse   Plan of Care Review   Progress improving       Problem: Pain, Acute (Adult)  Goal: Acceptable Pain Control/Comfort Level  Outcome: Ongoing (interventions implemented as appropriate)      Problem: Fall Risk (Adult)  Goal: Identify Related Risk Factors and Signs and Symptoms  Outcome: Ongoing (interventions implemented as appropriate)    Goal: Absence of Falls  Outcome: Ongoing (interventions implemented as appropriate)

## 2018-04-24 NOTE — CONSULTS
GI Consult Note    Subjective     Alex Gonzalez is a 73 y.o. male who was admitted for Elevated troponin [R74.8]  Atrial flutter, unspecified type (CMS/HCC) (HCC) [I48.92]  Chest pain, unspecified type [R07.9].   Patient was seen in consultation at the request of referring physician for management recommendations related to Vomiting and ascites, elevated LFTs.    Patient presented to [facility] 4/22 for palpitations + pleuritic chest pain. PMH significant only for PNA this past Feb 2018; awoke midnight 2 nights ago with chest pain worsened with inspiration + racing heart beat. He has had episodes like this before, this presentation was prolonged so he decided to come to the Er. First trop in ER 0.06, cardiology was consulted -- A flutter, new RVR, on amio, no evidence of pericarditis. CT chest revealing no Pe, some atelectasis, normal heart + no pericardial effusion. Surgery was also consulted for ? Biliary disease. Reported nausea + vomiting + severe abd pain since January that has come and gone. Epigastric + RUQ pain present but controlled when surgery saw. MRI Abd ordered revealing liver with normal parenchyma, extended periportal edema, normal liver enhancemet, normal portal veins, hepatic veins normal, widely patent celiac, SMA, renal arteries; intrahepatic + extrahepatic bile ducts + central bile ducts normal in caliber without choledocolithiasis, no extrinsic compressing mass, or large biliary stricture, GB -- diffuse GB wall edema + pericholecystic free fluid. US Abd revealing ? Cholecystitis, limited exam, wall thickening up to 5 mm. HIDA then ordered and normal. Labs on admission -- bili t 3.3, AST 95, ALT 79, WBC 13.26. Now 2 days later Bili t 2.9, , , A phos normal, WBC 11.60. NPO for ? Surgery at some point, will discuss with surgery. Given Mri negative for choledocolithiasis do not suspect choledoco. Pt somewhat conflicting with previous history given over past 2 days but tells me today he is  completely pain free. No RUQ, epigastric, chest, or pleuritic pain. Pt states that he has never been much of an eater but has not been significantly nauseas but not wanting to eat much but this is not a new symptom. Pt states that he does not have any RUQ pain;apparently told cardiology he did yesterday along with nausea. Echo revealing slight RV and LV dysfunction, also with significant RA dilation -- concerning for ?cardiac or liver pathology such as venous clot --> however MRI completely normal, vessels patent. Surgery has seen and not planning intervention with HIDA negative. LFTs revealing initially minimally elevated LFTs then with bili >3. Today bili down to 2.9 but AST/ALT increased. At this time most likely scenario is passed stone with ? Underlying cardiac dysfunction, less likely liver congestion causing elevated LFTs. Afebrile this Am, afebrile on admission.  Pt denies ever having colonoscopy previously. Remarkably healthy 73 y old male. Talked extensively with Dr. Mcmanus about case. Pt denies vomiting, hematemesis, hematochezia, melena, weight loss (weight gain of 10 lbs), diarrhea, constipation, change in bowel habits, shortness of breath.     Pertinent radiology results reviewed. Pertinent lab results reviewed..    Medical History:   Past Medical History:   Diagnosis Date   • Skin cancer        Surgical History:   Past Surgical History:   Procedure Laterality Date   • BASAL CELL CARCINOMA EXCISION         Social History:   Social History     Social History Narrative    Plays for private events       Family History:   Family History   Problem Relation Age of Onset   • Atrial fibrillation Sister        Allergies: No Known Allergies    Home Medications:      Current Medications:  Current Facility-Administered Medications   Medication Dose Route Frequency Provider Last Rate Last Dose   • acetaminophen (TYLENOL) tablet 650 mg  650 mg oral q4h PRN Tiffany Jolly MD       • alum-mag hydroxide-simeth  "(MAALOX) 200-200-20 mg/5 mL suspension 30 mL  30 mL oral q4h PRN Tiffany Jolly MD       • ampicillin-sulbactam (UNASYN) IVPB 3 g/100 mL NSS  3 g intravenous q6h INT Lucinda Tran MD   Stopped at 04/24/18 0420   • glucose chewable tablet 16-32 g of dextrose  16-32 g of dextrose oral PRN Tiffany Jolly MD        Or   • dextrose 40 % oral gel 15-30 g of dextrose  15-30 g of dextrose oral PRN Tiffany Jolly MD        Or   • glucagon (GLUCAGEN) injection 1 mg  1 mg intramuscular PRN Tiffany Jolly MD        Or   • dextrose in water injection 12.5 g  25 mL intravenous PRN Tiffany Jolly MD       • docusate sodium (COLACE) capsule 100 mg  100 mg oral BID Tiffany Jolly MD   100 mg at 04/23/18 0830   • enoxaparin (LOVENOX) syringe 75 mg  1 mg/kg subcutaneous q12h [facility] CANDACE Saclido   75 mg at 04/23/18 2136   • ketorolac (TORADOL) injection 15 mg  15 mg intravenous q6h PRN Tiffany Jolly MD   15 mg at 04/22/18 2041   • melatonin capsule 5 mg  5 mg oral Nightly PRN Tiffany Jolly MD       • metoprolol tartrate (LOPRESSOR) tablet 25 mg  25 mg oral BID CANDACE Salcido   25 mg at 04/23/18 2026   • ondansetron (ZOFRAN) injection 4 mg  4 mg intravenous q8h PRN Tiffany Jolly MD   4 mg at 04/23/18 0651   • pantoprazole (PROTONIX) tablet,delayed release (DR/EC) 40 mg  40 mg oral Daily Tiffany Jolly MD   40 mg at 04/23/18 0831   • sodium chloride 0.9 % infusion   intravenous Continuous Tiffany Jolly  mL/hr at 04/24/18 0139           Review of Systems  All other systems reviewed and negative except as noted in the HPI.    Objective     Physical Exam  /72 (BP Location: Left upper arm, Patient Position: Lying)   Pulse 65   Temp 36.7 °C (98.1 °F) (Oral)   Resp 18   Ht 1.803 m (5' 11\")   Wt 77.2 kg (170 lb 3.2 oz)   SpO2 98%   BMI 23.74 kg/m²     General Appearance:    Alert, cooperative, no distress, appears stated age "   Head:    Normocephalic, without obvious abnormality, atraumatic   Eyes:    PERRL, conjunctiva/corneas clear, both eyes        Throat:    Neck:   Supple, symmetrical, trachea midline, no adenopathy;        thyroid:  No enlargement/tenderness/nodules; no carotid    bruit or JVD   Back:     Symmetric, no curvature, ROM normal, no CVA tenderness   Lungs:     Clear to auscultation bilaterally, respirations unlabored   Chest wall:    No tenderness or deformity   Heart:    Regular rate and rhythm, S1 and S2 normal, no murmur, rub   or gallop   Abdomen:     Soft, non-tender, bowel sounds active all four quadrants,     no masses, no organomegaly   Genitalia:    deferred   Rectal:    deferred   Extremities:  Musculoskeletal:   Extremities normal, atraumatic, no cyanosis or edema    No injury or deformity   Pulses:   2+ and symmetric all extremities   Skin:   Skin color, texture, turgor normal, no rashes or lesions   Lymph nodes:   Cervical, supraclavicular, and axillary nodes normal   Neurologic:    Behavior/  Emotional:   CNII-XII intact. Normal strength, sensation and reflexes       throughout    Appropriate, cooperative       Labs  I have reviewed the patient's labs.  Significant abnormals are t bili 2.9, , , normal alk phos, WBC 11.60.    Recent Results (from the past 48 hour(s))   ECG 12 lead    Collection Time: 04/22/18  8:53 AM   Result Value Ref Range    Ventricular rate 141     Atrial rate 141     TN Interval 142     QRS duration 82     QT Interval 272     QTC Calculation(Bezet) 416     P Axis 249     R Axis 83     T Wave Axis 60    Lipid panel - Stat    Collection Time: 04/22/18  9:23 AM   Result Value Ref Range    Triglycerides 38 30 - 149 mg/dL    Cholesterol 128 <=200 mg/dL    HDL 55 >=45 mg/dL    LDL Calculated 65 <=100 mg/dL    Non-HDL, Calculated 73 mg/dL    RISK 2.3 <=5.0   Troponin I    Collection Time: 04/22/18  9:23 AM   Result Value Ref Range    Troponin I 0.05 (H) <0.05 ng/mL   Troponin I     Collection Time: 04/22/18  1:09 PM   Result Value Ref Range    Troponin I 0.06 (H) <0.05 ng/mL   Amylase    Collection Time: 04/22/18  1:09 PM   Result Value Ref Range    Amylase 55 28 - 100 U/L   Lipase    Collection Time: 04/22/18  1:09 PM   Result Value Ref Range    Lipase 20 20 - 51 U/L   Troponin I    Collection Time: 04/23/18  2:59 AM   Result Value Ref Range    Troponin I 0.05 (H) <0.05 ng/mL   Hepatic function panel    Collection Time: 04/23/18  2:59 AM   Result Value Ref Range    Albumin 3.5 3.4 - 5.0 g/dL    Bilirubin, Total 3.3 (H) 0.3 - 1.2 mg/dL    Bilirubin, Direct 1.8 (H) <=0.4 mg/dL    Alkaline Phosphatase 107 35 - 126 IU/L    AST (SGOT) 95 (H) 15 - 41 IU/L    ALT (SGPT) 79 (H) 16 - 63 IU/L    Total Protein 6.4 6.0 - 8.2 g/dL   TSH    Collection Time: 04/23/18  3:05 AM   Result Value Ref Range    TSH 3.90 0.34 - 5.60 uIU/mL   Magnesium    Collection Time: 04/23/18  3:05 AM   Result Value Ref Range    Magnesium 2.3 1.8 - 2.5 mg/dL   Basic metabolic panel    Collection Time: 04/23/18  3:05 AM   Result Value Ref Range    Sodium 137 136 - 144 mmol/L    Potassium 4.6 3.6 - 5.1 mmol/L    Chloride 110 (H) 98 - 109 mmol/L    CO2 19 (L) 22 - 32 mmol/L    BUN 27 (H) 8 - 20 mg/dL    Creatinine 1.3 0.8 - 1.3 mg/dL    Glucose 159 (H) 70 - 99 mg/dL    Calcium 8.4 (L) 8.9 - 10.3 mg/dL    eGFR 54.0 (L) >=60.0 mL/min/1.73m*2    Anion Gap 8 3 - 15 mEQ/L   CBC    Collection Time: 04/23/18  3:05 AM   Result Value Ref Range    WBC 13.26 (H) 3.80 - 10.50 K/uL    RBC 3.91 (L) 4.50 - 5.80 M/uL    Hemoglobin 12.8 (L) 13.7 - 17.5 g/dL    Hematocrit 38.6 (L) 40.0 - 51.0 %    MCV 98.7 (H) 83.0 - 98.0 fL    MCH 32.7 28.0 - 33.2 pg    MCHC 33.2 32.2 - 36.5 g/dL    RDW 13.7 11.6 - 14.4 %    Platelets 193 150 - 350 K/uL    MPV 11.1 9.4 - 12.4 fL   Diff Count    Collection Time: 04/23/18  3:05 AM   Result Value Ref Range    Differential Type Manu     Neutrophils 78 %    Lymphocytes 6 %    Monocytes 13 %    Eosinophils 0 %     Basophils 1 %    Myelocytes 1 %    Atypical Lymphocytes 1 %    Neutrophils, Absolute 10.34 (H) 1.70 - 7.00 K/uL    Lymphocytes, Absolute 0.80 (L) 1.20 - 3.50 K/uL    Monocytes, Absolute 1.72 (H) 0.30 - 1.00 K/uL    Eosinophils, Absolute 0.00 (L) 0.04 - 0.54 K/uL    Basophils, Absolute 0.13 (H) 0.01 - 0.10 K/uL    Myelocytes, Absolute 0.13 (H) <=0.00 K/uL    Vielka Cells 1+     Platelet Estimate Adequate (150,000-400,000)     PLT Morphology Normal    Hepatic function panel    Collection Time: 04/23/18  3:05 AM   Result Value Ref Range    Albumin 3.3 (L) 3.4 - 5.0 g/dL    Bilirubin, Total 3.4 (H) 0.3 - 1.2 mg/dL    Bilirubin, Direct 1.9 (H) <=0.4 mg/dL    Alkaline Phosphatase 112 35 - 126 IU/L    AST (SGOT) 93 (H) 15 - 41 IU/L    ALT (SGPT) 79 (H) 16 - 63 IU/L    Total Protein 5.9 (L) 6.0 - 8.2 g/dL   Transthoracic Echo (TTE) complete    Collection Time: 04/23/18 10:23 AM   Result Value Ref Range    LV Length-diastolic Apical Two Chamber 7.86 cm    LV Length-diastolic Apical Four Chamber 7.82 cm    LV Area-diastolic Apical Two Chamber 30.80 cm2    LV Area-diastolic Apical Four Chamber 21.90 cm2    EDV (MM-TEICH) 84.90 cm3    LV Length-systolic Apical Two Chamber 7.14 cm    LV Length-systolic Apical Four Chamber 6.98 cm    LV Area-systolic Apical Two Chamber 18.00 cm2    LV ESV (Apical 2 Chamber) 40.00 cm3    LV Systolic Volume 29.80 cm3    End Systolic Volume Biplane 34.70 cm3    ESV (MM-TEICH) 41.90 cm3    MV E' Tissue Velocity Lateral 0.08 m/s    MV E' Tissue Velocity Medial 0.08 m/s    Ejection Fraction Apical 2 Chamber 61.40 %    Ejection Fraction (A4C) 41.90 %    EF 52.50 %    EF (MM-TEICH) 50.60 %    Tissue doppler E/ Lateral E' ratio 10.20     E/E' ratio 10.20     Left Atrium/Aorta Ratio 1.35     Mitral valve mean inflow velocity 3.23 m/s    Mitral Regurgitant Peak Velocity 4.33 m/s    E wave decelartion time 123.00 ms    MV Peak A Vinayak 0.38 m/s    MV Peak E Vinayak 0.80 m/s    MR PISA EROA 0.12 cm2    MV valve area p  1/2 method 6.11 cm2    Mitral Reguritant Flow 18.00 cm3    E/A ratio 2.10     Tricuspid valve peak regurgitation velocity 1.94 m/s    Triscuspid Valve Regurgitation Peak Gradient 15.00 mmHg    BSA 1.96 m2    Left Atrium Dimension 2D 4.2 cm    LVIDD 4.34 4.79 - 6.65 cm    IVS 0.70 cm    PW 0.60 cm    Ao root annulus 3.1 cm    LVIDS 3.23 2.82 - 4.27 cm    LV Systolic Volume Index 15.20 cm3/m2    LV Diastolic Volume 51.50 mL    LV Diastolic Volume Index 26.28 mL/m2    LV Systolic Volume Index (A2C) 20.41 cm3/m2    LV Systolic Volume  Index (BP) 17.70 cm3/m2    LV Diastolic Volume Index (A2C) 53.06 mL/m2    LV EDV (apical 2 chamber 104.00 mL    ZLVIDD -2.62     ZLVIDS -0.56     Estimated r vent sys pressure by tricuspid regurg jet 26 mmHg   ECG 12 lead    Collection Time: 04/23/18 10:27 AM   Result Value Ref Range    Ventricular rate 66     Atrial rate 66     TX Interval 226     QRS duration 88     QT Interval 452     QTC Calculation(Bezet) 473     P Axis 72     R Axis 87     T Wave Axis 62    Basic metabolic panel    Collection Time: 04/24/18  4:53 AM   Result Value Ref Range    Sodium 140 136 - 144 mmol/L    Potassium 4.6 3.6 - 5.1 mmol/L    Chloride 112 (H) 98 - 109 mmol/L    CO2 21 (L) 22 - 32 mmol/L    BUN 29 (H) 8 - 20 mg/dL    Creatinine 1.3 0.8 - 1.3 mg/dL    Glucose 98 70 - 99 mg/dL    Calcium 8.0 (L) 8.9 - 10.3 mg/dL    eGFR 54.0 (L) >=60.0 mL/min/1.73m*2    Anion Gap 7 3 - 15 mEQ/L   Hepatic function panel    Collection Time: 04/24/18  4:53 AM   Result Value Ref Range    Albumin 2.9 (L) 3.4 - 5.0 g/dL    Bilirubin, Total 1.5 (H) 0.3 - 1.2 mg/dL    Bilirubin, Direct 0.7 (H) <=0.4 mg/dL    Alkaline Phosphatase 87 35 - 126 IU/L    AST (SGOT) 270 (H) 15 - 41 IU/L    ALT (SGPT) 254 (H) 16 - 63 IU/L    Total Protein 5.4 (L) 6.0 - 8.2 g/dL   Type and screen    Collection Time: 04/24/18  4:53 AM   Result Value Ref Range    Antibody Screen Negative     ABO AB     Rh Factor Positive     History Check Previous type on  file    CBC    Collection Time: 04/24/18  4:53 AM   Result Value Ref Range    WBC 11.60 (H) 3.80 - 10.50 K/uL    RBC 3.56 (L) 4.50 - 5.80 M/uL    Hemoglobin 11.8 (L) 13.7 - 17.5 g/dL    Hematocrit 35.8 (L) 40.0 - 51.0 %    .6 (H) 83.0 - 98.0 fL    MCH 33.1 28.0 - 33.2 pg    MCHC 33.0 32.2 - 36.5 g/dL    RDW 13.9 11.6 - 14.4 %    Platelets 187 150 - 350 K/uL    MPV 11.1 9.4 - 12.4 fL   Diff Count    Collection Time: 04/24/18  4:53 AM   Result Value Ref Range    Differential Type Auto     nRBC 0.0 <=0.0 %    Immature Granulocytes 0.5 %    Neutrophils 63.8 %    Lymphocytes 20.4 %    Monocytes 11.3 %    Eosinophils 3.4 %    Basophils 0.6 %    Immature Granulocytes, Absolute 0.06 0.00 - 0.08 K/uL    Neutrophils, Absolute 7.40 (H) 1.70 - 7.00 K/uL    Lymphocytes, Absolute 2.37 1.20 - 3.50 K/uL    Monocytes, Absolute 1.31 (H) 0.30 - 1.00 K/uL    Eosinophils, Absolute 0.39 0.04 - 0.54 K/uL    Basophils, Absolute 0.07 0.01 - 0.10 K/uL   Protime-INR    Collection Time: 04/24/18  4:54 AM   Result Value Ref Range    PT 18.3 (H) 12.2 - 14.5 sec.    INR 1.5 <=6.0 INR       Imaging  I have reviewed the Imaging from the last 24 hrs.    Nm Hepatobiliary    Result Date: 4/23/2018  Narrative: CLINICAL HISTORY: 73-year-old male with right upper quadrant abdominal pain. Gallbladder wall thickening and pericholecystic fluid on recent right upper quadrant ultrasound. COMMENT: Following the IV administration of 6 mCi of technetium 99m Choletec sequential scintigraphic imaging of the liver and upper abdomen was acquired over 60 minutes.  Additional frontal and lateral static images were obtained after 60 minutes of imaging. There is normal prompt hepatic extraction  and excretion with normal visualization of intra and extrahepatic bile ducts and bowel.  The gallbladder is visualized by 35 minutes which is normal.     Impression: IMPRESSION: Normal examination. I certify that I have reviewed this examination and agree with this  report. Yamilex Daniel MD    X-ray Chest 1 View    Result Date: 4/22/2018  Narrative: CLINICAL HISTORY:  Chest pain COMMENT: Views: Single frontal portable.. Comparison date: 2/15/2018. The heart and mediastinal contours are within normal limits.  The trachea is midline.  The lungs are clear without discrete infiltrate.  There  are no pleural effusions.  The osseous structures are intact.     Impression: IMPRESSION: No active disease.     Ct Chest Pulmonary Embolism With Iv Contrast    Result Date: 4/22/2018  Narrative: CLINICAL HISTORY: Chest pain and shortness of breath. Clinical concern for pulmonary embolus. COMPARISON: Correlation to radiographs of the chest performed earlier same day. COMMENT: TECHNIQUE: CT angiogram of the chest was performed with the patient in the supine position. Images reconstructed/reformatted in the axial, coronal and sagittal plane. CT DOSE:  The kV and/or mA were adjusted according to the patient's size and body part for CT dose reduction. INTRAVENOUS CONTRAST: 120 mL of Omnipaque 350 intravenous contrast was administered without event. Image quality and contrast bolus timing: Suboptimal though adequate VESSELS: There are no filling defects seen within the central or proximal lobar pulmonary arteries to suggest a pulmonary embolism. LUNG, LARGE AIRWAYS AND PLEURA: There is left basilar subsegmental atelectasis. CHEST WALL AND LOWER NECK: within normal limits. MEDIASTINUM AND JIMMIE: within normal limits. HEART: normal size. No pericardial effusion. UPPER ABDOMEN:within normal limits. BONES: within normal limits.     Impression: IMPRESSION: 1.  Left basilar subsegmental atelectasis. 2.  No evidence of pulmonary embolism to the level of the proximal segmental pulmonary artery. A preliminary report was provided to the Emergency Department by the radiology resident at 3:30 AM on the date of the examination. I certify that I have reviewed this examination and agree with this report.  Dinora Bloom MD    Mri Abdomen With And Without Contrast Mrcp    Result Date: 4/23/2018  Narrative: CLINICAL HISTORY: Abnormal liver function tests (LFTs) Jaundice, abd pain, fever or biliary surgery or gallstones COMMENT: Comparison: Nuclear medicine hepatobiliary scan 4/23/2018, CT chest 4/22/2018 Procedure: MRI of the abdomen was performed on a  1.5 Merry scanner. Images acquired before and after the uneventful intravenous administration of 14.5 mL Dotarem, gadolinium based contrast material.  3D MRCP was performed with multiple dedicated T2 sequences and MIP images reconstructed on the workstation. Liver: Liver is normal in signal intensity and morphology.   No restricted diffusion.  Extended periportal edema.  Normal liver enhancement. Abdominal Vessels: Veins: Main portal vein is patent and normal size. Right and left portal vein branches, superior mesenteric vein, and splenic vein are normal. Hepatic veins are normal. Arteries: Aorta is normal in caliber. Hepatic arterial anatomy is standard. Celiac artery, SMA and renal arteries are widely patent. Intrahepatic and extrahepatic bile ducts: Extra hepatic bile ducts and central intrahepatic bile ducts normal in caliber without evidence of choledocholithiasis, extrinsic compressing mass or large biliary stricture. More peripheral intrahepatic bile ducts are obscured due to extensive periportal edema.  No evidence of peribiliary restricted diffusion or enhancement. Gallbladder: Diffuse gallbladder wall edema and pericholecystic free fluid is nonspecific findings and the presence of third spacing.  Layering sludge are too small to see gallstones. Pancreas: Pancreas is normal in signal intensity and enhancement.  Pancreatic duct caliber is normal. Spleen: Within normal limits Adrenal glands: Within normal limits Kidneys: Left peripelvic cysts.  Subcentimeter renal cyst. Lymph nodes: Within normal limits. Free fluid: Moderate amount of abdominal ascites.  Moderate  anasarca.  Moderate free fluid within the retroperitoneal spaces. Bones: No suspicious findings. Imaged bowel: Normal caliber. Abdominal wall: Within normal limits. Lung bases: Partially imaged bilateral pleural effusions, larger on the right.     Impression: IMPRESSION: 1.  Third spacing with moderate ascites, partially imaged pleural effusions, anasarca and periportal edema. 2.  No evidence of choledocholithiasis or obstructive biliopathy.     Transthoracic Echo (tte) Complete    Addendum Date: 4/23/2018 Addendum:   · Mild mitral valve regurgitation. Normal leaflet structure. · Trace aortic valve regurgitation. · Mild tricuspid valve regurgitation with RVSP of 26 mmHg. · Normal-sized left ventricular cavity. Mildly decreased systolic function. Estimated EF = 45%. Diastolic dysfunction consistent with restrictive physiology. Abnormal septal wall motion. · Mildly dilated left atrium. · Mildly dilated right ventricular cavity with mildly reduced systolic function. · Mildly dilated left atrium. The atrial septum is thin. There is no evidence of shunting by color flow doppler or aggitated saline injection. · Severely dilated right atrium.      Result Date: 4/23/2018  Narrative: · Mild mitral valve regurgitation. Normal leaflet structure. · Trace aortic valve regurgitation. · Mild tricuspid valve regurgitation with RVSP of 26 mmHg. · Normal-sized left ventricular cavity. Mildly decreased systolic function. Estimated EF = 45%. Diastolic dysfunction consistent with restrictive physiology. Abnormal septal wall motion. · Mildly dilated left atrium. · Mildly dilated right ventricular cavity with moderately reduced systolic function. · Mildly dilated left atrium. The atrial septum is thin. There is no evidence of shunting by color flow doppler. Given severe right atrial enlargement, an ASD cannot be excluded. · Severely dilated right atrium. · Recommend follow up TTE with bubble study to rule out atrial septal defect or PFO.       Ultrasound Abdomen Limited    Result Date: 4/22/2018  Narrative: CLINICAL HISTORY:  RUQ pain and nausea, cholecystitis suspected. COMMENT: A limited abdominal ultrasound with attention to the right upper quadrant is performed. COMPARISON: Correlation with CT of the chest performed earlier same day. Liver:  The liver measures 13.2 cm in length in the midclavicular line. The liver is homogeneous in echotexture. No focal hepatic lesions are seen. There is trace perihepatic free fluid noted in Funk's pouch. Biliary tree:  There is no intra or extra hepatic biliary duct dilatation with the common bile duct measuring 4 mm. Gallbladder:  No gallstones are seen within the gallbladder. There is a single small 4 mm gallbladder polyp. Gallbladder wall measures 5 mm in maximal thickness, however the gallbladder is partially contracted. There is trace pericholecystic fluid noted. Pancreas:   Within normal limits. Right Kidney:  The right kidney measures 10.0 cm in length without evidence for hydronephrosis. Other findings: There is a trace right pleural effusion noted.     Impression: IMPRESSION: 1. The gallbladder is contracted, limiting evaluation, demonstrates apparent wall thickening, measuring up to 5 mm. There is a 4 mm gallbladder polyp and trace pericholecystic fluid is present. If there is ongoing clinical concern for acute cholecystitis, hepatobiliary imaging may be considered for further evaluation. 2. Trace right pleural effusion. I certify that I have reviewed this examination and agree with this report. Dr. Jesus Esquivel MD      Assessment:     73 y M with no significant PMH presenting for abnormal LFTs, ?nausea, with prior report of abd pain after US revealing thickening GB wall, negative HIDA, MRI revealing normal ducts without acute abnormality; presented initially for pleuritic type chest pain, found to be in a flutter --> echo revealing dilated Ra, ?liver pathology at this time but no clot in  venous system as evidence of MRI; no surgical intervention planned, no WBC, afebrile; ?cardiac etiology causing 2/2 liver abnormalities as evidence of labs; no etoh abuse, new meds, recent ABX    Recommendations:  - Trend liver enzymes; continue daily while inpatient; can likely watch another day and as long going down appropriately can f/u outpatient to trend   - If no surgical plans can have low res diet   - Management of cardiac issues per cardiology  -  Zofran PRN for nausea; although not nauseas while in room  - If pain recurs please notify; not typical biliary type symptoms and negative HIDA; ? Passed CBD stone with labs   - AI hepatitis labs ordered  - Acute hepatitis labs ordered     CANDACE Bay  4/24/2018  7:47 AM     Attending:  There are 2 major possible explanations that could account for both his abnormal liver enzymes and the findings on his echocardiogram.  The first is that he has acute right-sided heart failure, etiology unclear at this point, with the LFT abnormalities being a secondary process due to acute hepatic congestion, as evidenced by the dilated IVC as well.  The second is that he passed a gallstone into his biliary tree and then  into the duodenum accounting for the bump in LFTs and evidence of inflammation around the gallbladder in the absence of stones.  This acute event then precipitated his atrial flutter for which he may be at increased risk due to a more chronic cardiac issue.    In any case, at this point he is doing well.  I would recheck his LFTs tomorrow and is coming down, hold off on any further investigation (we ordered serologies which should be negative).  These can then be followed to normalization by his primary physician.  Will follow up with cardiology.  He will also set up an elective initial (and presumably final) screening colonoscopy with our office.    Mariano Paniagua MD

## 2018-04-25 ENCOUNTER — APPOINTMENT (INPATIENT)
Dept: RADIOLOGY | Facility: HOSPITAL | Age: 73
DRG: 315 | End: 2018-04-25
Attending: HOSPITALIST
Payer: MEDICARE

## 2018-04-25 PROBLEM — D72.829 LEUKOCYTOSIS: Status: RESOLVED | Noted: 2018-04-22 | Resolved: 2018-04-25

## 2018-04-25 LAB
ALBUMIN SERPL-MCNC: 2.9 G/DL (ref 3.4–5)
ALP SERPL-CCNC: 98 IU/L (ref 35–126)
ALT SERPL-CCNC: 564 IU/L (ref 16–63)
ANION GAP SERPL CALC-SCNC: 7 MEQ/L (ref 3–15)
AST SERPL-CCNC: 593 IU/L (ref 15–41)
ATRIAL RATE: 182
BILIRUB SERPL-MCNC: 1 MG/DL (ref 0.3–1.2)
BUN SERPL-MCNC: 22 MG/DL (ref 8–20)
CALCIUM SERPL-MCNC: 8.1 MG/DL (ref 8.9–10.3)
CHLORIDE SERPL-SCNC: 115 MMOL/L (ref 98–109)
CO2 SERPL-SCNC: 19 MMOL/L (ref 22–32)
CREAT SERPL-MCNC: 1.1 MG/DL (ref 0.8–1.3)
ERYTHROCYTE [DISTWIDTH] IN BLOOD BY AUTOMATED COUNT: 13.8 % (ref 11.6–14.4)
FERRITIN SERPL-MCNC: 632 NG/ML (ref 24–250)
GFR SERPL CREATININE-BSD FRML MDRD: >60 ML/MIN/1.73M*2
GLUCOSE SERPL-MCNC: 81 MG/DL (ref 70–99)
HAV IGM SER QL: NONREACTIVE S/CO
HBV CORE IGM SER QL: NONREACTIVE S/CO
HBV SURFACE AG SER QL: NONREACTIVE S/CO
HCT VFR BLDCO AUTO: 38.8 % (ref 40–51)
HCV AB SER QL: NONREACTIVE S/CO
HGB BLD-MCNC: 12.6 G/DL (ref 13.7–17.5)
INR PPP: 1.3 INR
MCH RBC QN AUTO: 32.5 PG (ref 28–33.2)
MCHC RBC AUTO-ENTMCNC: 32.5 G/DL (ref 32.2–36.5)
MCV RBC AUTO: 100 FL (ref 83–98)
MITOCHONDRIA AB SER QL IF: NEGATIVE
PDW BLD AUTO: 10.6 FL (ref 9.4–12.4)
PLATELET # BLD AUTO: 194 K/UL (ref 150–350)
POTASSIUM SERPL-SCNC: 4.1 MMOL/L (ref 3.6–5.1)
PROT SERPL-MCNC: 5.4 G/DL (ref 6–8.2)
PROTHROMBIN TIME: 16.1 SEC. (ref 12.2–14.5)
QRS DURATION: 86
QT INTERVAL: 376
QTC CALCULATION(BAZETT): 501
R AXIS: 83
RBC # BLD AUTO: 3.88 M/UL (ref 4.5–5.8)
SMOOTH MUSCLE AB SER QL IF: NEGATIVE
SODIUM SERPL-SCNC: 141 MMOL/L (ref 136–144)
T WAVE AXIS: 58
VENTRICULAR RATE: 107
WBC # BLD AUTO: 8.28 K/UL (ref 3.8–10.5)

## 2018-04-25 PROCEDURE — 63600000 HC DRUGS/DETAIL CODE: Performed by: PHYSICIAN ASSISTANT

## 2018-04-25 PROCEDURE — 21400000 HC ROOM AND CARE CCU/INTERMEDIATE

## 2018-04-25 PROCEDURE — 99233 SBSQ HOSP IP/OBS HIGH 50: CPT | Performed by: HOSPITALIST

## 2018-04-25 PROCEDURE — 85027 COMPLETE CBC AUTOMATED: CPT | Performed by: HOSPITALIST

## 2018-04-25 PROCEDURE — 36415 COLL VENOUS BLD VENIPUNCTURE: CPT | Performed by: HOSPITALIST

## 2018-04-25 PROCEDURE — 86665 EPSTEIN-BARR CAPSID VCA: CPT | Performed by: PHYSICIAN ASSISTANT

## 2018-04-25 PROCEDURE — 63700000 HC SELF-ADMINISTRABLE DRUG: Performed by: INTERNAL MEDICINE

## 2018-04-25 PROCEDURE — 83550 IRON BINDING TEST: CPT | Performed by: INTERNAL MEDICINE

## 2018-04-25 PROCEDURE — 76705 ECHO EXAM OF ABDOMEN: CPT

## 2018-04-25 PROCEDURE — 85610 PROTHROMBIN TIME: CPT | Performed by: PHYSICIAN ASSISTANT

## 2018-04-25 PROCEDURE — 82164 ANGIOTENSIN I ENZYME TEST: CPT | Performed by: PHYSICIAN ASSISTANT

## 2018-04-25 PROCEDURE — 25000000 HC PHARMACY GENERAL: Performed by: HOSPITALIST

## 2018-04-25 PROCEDURE — 82728 ASSAY OF FERRITIN: CPT | Performed by: INTERNAL MEDICINE

## 2018-04-25 PROCEDURE — 80053 COMPREHEN METABOLIC PANEL: CPT | Performed by: HOSPITALIST

## 2018-04-25 PROCEDURE — 63700000 HC SELF-ADMINISTRABLE DRUG: Performed by: HOSPITALIST

## 2018-04-25 PROCEDURE — 86644 CMV ANTIBODY: CPT | Performed by: PHYSICIAN ASSISTANT

## 2018-04-25 PROCEDURE — 93005 ELECTROCARDIOGRAM TRACING: CPT | Performed by: HOSPITALIST

## 2018-04-25 PROCEDURE — 25800000 HC PHARMACY IV SOLUTIONS: Performed by: HOSPITALIST

## 2018-04-25 PROCEDURE — 63700000 HC SELF-ADMINISTRABLE DRUG: Performed by: PHYSICIAN ASSISTANT

## 2018-04-25 RX ORDER — METOPROLOL TARTRATE 25 MG/1
25 TABLET, FILM COATED ORAL ONCE
Status: COMPLETED | OUTPATIENT
Start: 2018-04-25 | End: 2018-04-25

## 2018-04-25 RX ORDER — FUROSEMIDE 10 MG/ML
20 INJECTION INTRAMUSCULAR; INTRAVENOUS ONCE
Status: COMPLETED | OUTPATIENT
Start: 2018-04-25 | End: 2018-04-25

## 2018-04-25 RX ORDER — METOPROLOL TARTRATE 50 MG/1
50 TABLET ORAL 2 TIMES DAILY
Status: DISCONTINUED | OUTPATIENT
Start: 2018-04-25 | End: 2018-04-27 | Stop reason: HOSPADM

## 2018-04-25 RX ORDER — METOPROLOL TARTRATE 50 MG/1
50 TABLET ORAL 2 TIMES DAILY
Status: DISCONTINUED | OUTPATIENT
Start: 2018-04-25 | End: 2018-04-25

## 2018-04-25 RX ADMIN — DOCUSATE SODIUM 100 MG: 100 CAPSULE, LIQUID FILLED ORAL at 20:14

## 2018-04-25 RX ADMIN — ENOXAPARIN SODIUM 75 MG: 100 INJECTION SUBCUTANEOUS at 09:39

## 2018-04-25 RX ADMIN — PANTOPRAZOLE SODIUM 40 MG: 40 TABLET, DELAYED RELEASE ORAL at 08:49

## 2018-04-25 RX ADMIN — AMPICILLIN SODIUM AND SULBACTAM SODIUM 3 G: 2; 1 INJECTION, POWDER, FOR SOLUTION INTRAMUSCULAR; INTRAVENOUS at 03:09

## 2018-04-25 RX ADMIN — Medication 5 MG/HR: at 12:08

## 2018-04-25 RX ADMIN — ENOXAPARIN SODIUM 75 MG: 100 INJECTION SUBCUTANEOUS at 22:02

## 2018-04-25 RX ADMIN — METOPROLOL TARTRATE 25 MG: 25 TABLET ORAL at 08:49

## 2018-04-25 RX ADMIN — DOCUSATE SODIUM 100 MG: 100 CAPSULE, LIQUID FILLED ORAL at 08:49

## 2018-04-25 RX ADMIN — SODIUM CHLORIDE: 9 INJECTION, SOLUTION INTRAVENOUS at 09:39

## 2018-04-25 RX ADMIN — METOPROLOL TARTRATE 50 MG: 50 TABLET, FILM COATED ORAL at 20:15

## 2018-04-25 RX ADMIN — FUROSEMIDE 20 MG: 10 INJECTION, SOLUTION INTRAVENOUS at 10:56

## 2018-04-25 RX ADMIN — METOPROLOL TARTRATE 25 MG: 25 TABLET ORAL at 10:55

## 2018-04-25 NOTE — PROGRESS NOTES
Hospital Medicine Service -  Daily Progress Note       SUBJECTIVE   Interval History:   Patient reports increasing abdominal distension and fullness sensation. Patient notes increasing discomfort 2/2 to distension. Patient denies abdominal pain, chest pain, N/V/D, SOB. Patient notes anxiety over what is going on.   Patient went into a rapid afib and was placed on Cardizem gtt.   Patient had 2-fold increase in LFTs today (up to mid-high 500s from 200s), bili has normalized. Further GI/Cardiac evaluation underway.     OBJECTIVE      Vital signs in last 24 hours:  Temp:  [36.3 °C (97.3 °F)-36.9 °C (98.4 °F)] 36.4 °C (97.6 °F)  Heart Rate:  [] 92  Resp:  [18] 18  BP: (107-139)/(60-80) 107/60    Intake/Output Summary (Last 24 hours) at 04/25/18 1256  Last data filed at 04/25/18 1208   Gross per 24 hour   Intake          3680.21 ml   Output              776 ml   Net          2904.21 ml       PHYSICAL EXAMINATION      Physical Exam   Constitutional: He is oriented to person, place, and time. He appears well-developed and well-nourished. No distress.   HENT:   Head: Normocephalic and atraumatic.   Eyes: Conjunctivae and EOM are normal.   Neck: Normal range of motion. Neck supple.   Cardiovascular: An irregularly irregular rhythm present.   Murmur (DAGMAR) heard.  Pulmonary/Chest: Breath sounds normal. He is in respiratory distress. He has no wheezes. He has no rales.   Abdominal: Soft. Bowel sounds are normal. He exhibits distension (+ ascites). There is no tenderness. There is no guarding.   Musculoskeletal: Normal range of motion.   Neurological: He is alert and oriented to person, place, and time. No cranial nerve deficit.   Skin: Skin is warm and dry.      LABS / IMAGING / TELE      Labs    Results from last 7 days  Lab Units 04/25/18  0440 04/24/18  0453 04/23/18  0305   SODIUM mmol/L 141 140 137   POTASSIUM mmol/L 4.1 4.6 4.6   CHLORIDE mmol/L 115* 112* 110*   CO2 mmol/L 19* 21* 19*   BUN mg/dL 22* 29* 27*    CREATININE mg/dL 1.1 1.3 1.3   CALCIUM mg/dL 8.1* 8.0* 8.4*   ALBUMIN g/dL 2.9* 2.9* 3.3*   BILIRUBIN TOTAL mg/dL 1.0 1.5* 3.4*   ALK PHOS IU/L 98 87 112   ALT IU/L 564* 254* 79*   AST IU/L 593* 270* 93*   GLUCOSE mg/dL 81 98 159*       Results from last 7 days  Lab Units 04/25/18  0440 04/24/18  0453 04/23/18  0305   WBC K/uL 8.28 11.60* 13.26*   HEMOGLOBIN g/dL 12.6* 11.8* 12.8*   HEMATOCRIT % 38.8* 35.8* 38.6*   PLATELETS K/uL 194 187 193     Imaging  No new imaging    ECG/Telemetry  I have independently reviewed the telemetry. Significant findings include Atrial Fibrillation, on cardizem gtt, fairly well rate controlled.    ASSESSMENT AND PLAN      Anxiety   Assessment & Plan      -Ativan if needed        Abnormal liver enzymes   Assessment & Plan    AST ALT trending up (2 fold today) and bilirubin trending down.  Abdomen ultrasound with some gallbladder thickening and trace pericholecystic fluid.  MRI abdomen with no evidence of choledochal lithiasis or obstructive uropathy.  Third spacing with moderate ascites.    Check hepatitis panel  GI Consult appreciated  Cardiology consult appreciated  Further evaluation in progress to establish liver disease vs cardiac disease        Cardiomyopathy (CMS/HCC) (HCC)   Assessment & Plan    EF ~45 with mild BiV dysfunction and severe diastolic dysfunction  Dilated RA     Possibly tachy mediated from afib  RA dilatation possibly from increased venous pressure  Pleural effusions/ascites/anasarca     Plan:  Follow volume status  Consider cardiac MRI as outpt if no etiology found  BB.  T/c low dose ACEI  Repeat echo in 1 month.             Right upper quadrant abdominal pain   Assessment & Plan    Abdominal pain improved  MRI noted  HIDA negative  LFTs rising- increased 2fold today, Tbili trending down        Plan:  GI Consult noted  Further work-up in progress  Surgery following-no plans for surgery   Advance diet.          Atrial flutter (CMS/HCC) (HCC)   Assessment & Plan     Pt was in symptomatic 2:1 atrial flutter, converted to SR with improvement in symptoms.   [id number] - 2 (age) - consider NOAC for AC- hold for now pending liver issues, INR 1.5  Echo with severely dilated RA  TSH/T4 Pending     Plan:  No further amio with elevated LFTs, continue lopressor 25 bid  Hold on OAC for now- on full dose lovenox.    Continue tele   F/u TSH            Chest pain   Assessment & Plan    Pain improved with toradol and is resolved now after converting to NSR.  Pericarditis possible but not likely- no pericardial abnormality on Echo   CT negative for PE  TN minimally elevated at 0.06. Has stayed flat.   EKG without significant ischemic abn  HIDA negative, MRI abdomen with ascites/periportal edema ,         Plan:  GI work up in progress - worsening LFTs, GI considering IR consult for paracentesis  Cards work-up in progress.  ?doppler US portal veins vs cardiac MRI?  Further evaluation for underlying liver disease vs cardiac disease in progress  Eventual outpt ischemic eval              VTE Assessment: [place] VTE Score: 1  VTE Prophylaxis Plan: Lovenox  Code Status: Full Code  Estimated Discharge Date: 4/25/2018     CANDACE Eller  4/25/2018

## 2018-04-25 NOTE — ASSESSMENT & PLAN NOTE
AST improving, gpt no change, feels improved  Etiology still not certain- r/o infiltrative cm, which along with his aflutter on presentation, may have caused decreased perfusion to liver?-should improve  Would continue low dose Lasix 20 mg, follow lfts as outpt

## 2018-04-25 NOTE — NURSING NOTE
Pt HR sustaining in 50s for a few minutes, then back up to 70s, /60, pt asymptomatic. Per CANDACE Canchola, okay with HR as long as pt is asymptomatic, okay to continue cardizem gtt @ 5mg/hr. Tele monitor low limit set to 50. Will continue to monitor.

## 2018-04-25 NOTE — PROGRESS NOTES
"   GI Daily Progress Note           SUBJECTIVE    LOS: 3 days     Interval History: Pt feeling ok, no further nausea, no abd pain. Feeling \"full\" this am; slightly increased abd distention. Bilirubin normalized but AST/ALT in 500s. Went into rapid a-fib on Cardizem drip per cardiology, not eligible for DC today. Afebrile overnight, Albumin 2.9, no WBC, Hgb stable. No complaints per patient, anxious about what is going on. AI and serological livre work up not back yet, will add on infectious to r/o actue infectious/viral process in setting of elevated hepatocellular enzymes.     Review of Systems  All other systems were reviewed and are negative oither than as stated in the HPI   OBJECTIVE   Vital signs in last 24 hours:  Temp:  [36.3 °C (97.3 °F)-36.9 °C (98.4 °F)] 36.7 °C (98 °F)  Heart Rate:  [] 112  Resp:  [18] 18  BP: (119-139)/(67-80) 136/74      Intake/Output Summary (Last 24 hours) at 04/25/18 0959  Last data filed at 04/24/18 2233   Gross per 24 hour   Intake          2082.29 ml   Output                1 ml   Net          2081.29 ml       Intake/Output this shift:  No intake/output data recorded.   Current Medications:  •  alum-mag hydroxide-simeth, 30 mL, oral, q4h PRN  •  glucose, 16-32 g of dextrose, oral, PRN **OR** dextrose, 15-30 g of dextrose, oral, PRN **OR** glucagon, 1 mg, intramuscular, PRN **OR** dextrose in water, 25 mL, intravenous, PRN  •  diltiazem, 5 mg/hr, intravenous, Titrated  •  docusate sodium, 100 mg, oral, BID  •  enoxaparin, 1 mg/kg, subcutaneous, q12h CATALINA  •  ketorolac, 15 mg, intravenous, q6h PRN  •  melatonin, 5 mg, oral, Nightly PRN  •  metoprolol tartrate, 25 mg, oral, Once  •  metoprolol tartrate, 50 mg, oral, BID  •  ondansetron, 4 mg, intravenous, q8h PRN  •  pantoprazole, 40 mg, oral, Daily  •  sodium chloride, , intravenous, Continuous    Physical Exam  General appearance: alert, appears stated age and cooperative  Head: normocephalic, without obvious abnormality, " atraumatic  Lungs: clear to auscultation bilaterally  Heart: regular rate and rhythm, S1, S2 normal, no murmur, click, rub or gallop  Abdomen: soft, mild/diffuse abd distention, +BS x 4, non-tender x 4, +ascites  Extremities: extremities normal, warm and well-perfused; no cyanosis, clubbing, or edema  Skin: Skin color, texture, turgor normal. No rashes or lesions  Neurologic: Grossly normal     LABS & IMAGING   Labs  I have reviewed the past 24 hour labs  , , normal bilirubin, normal Alk Phos, Albumin 2.9, Hgb 12.6 (stable), AI / infectious serologies pending    Results from last 7 days  Lab Units 04/25/18  0440 04/24/18  0453 04/23/18  0305   WBC K/uL 8.28 11.60* 13.26*   HEMOGLOBIN g/dL 12.6* 11.8* 12.8*   HEMATOCRIT % 38.8* 35.8* 38.6*   PLATELETS K/uL 194 187 193       Results from last 7 days  Lab Units 04/25/18  0440 04/24/18  0453 04/23/18  0305   SODIUM mmol/L 141 140 137   POTASSIUM mmol/L 4.1 4.6 4.6   CHLORIDE mmol/L 115* 112* 110*   CO2 mmol/L 19* 21* 19*   BUN mg/dL 22* 29* 27*   CREATININE mg/dL 1.1 1.3 1.3   CALCIUM mg/dL 8.1* 8.0* 8.4*   ALBUMIN g/dL 2.9* 2.9* 3.3*   BILIRUBIN TOTAL mg/dL 1.0 1.5* 3.4*   ALK PHOS IU/L 98 87 112   ALT IU/L 564* 254* 79*   AST IU/L 593* 270* 93*   GLUCOSE mg/dL 81 98 159*       Results from last 7 days  Lab Units 04/24/18  0454   INR INR 1.5           Imaging  I have reviewed the Imaging from the last 24 hrs.    Nm Hepatobiliary    Result Date: 4/23/2018  IMPRESSION: Normal examination. I certify that I have reviewed this examination and agree with this report. Yamilex Daniel MD    X-ray Chest 1 View    Result Date: 4/22/2018  IMPRESSION: No active disease.     Ct Chest Pulmonary Embolism With Iv Contrast    Result Date: 4/22/2018  IMPRESSION: 1.  Left basilar subsegmental atelectasis. 2.  No evidence of pulmonary embolism to the level of the proximal segmental pulmonary artery. A preliminary report was provided to the Emergency Department by the  radiology resident at 3:30 AM on the date of the examination. I certify that I have reviewed this examination and agree with this report. Dinora Bloom MD    Mri Abdomen With And Without Contrast Mrcp    Result Date: 4/23/2018  IMPRESSION: 1.  Third spacing with moderate ascites, partially imaged pleural effusions, anasarca and periportal edema. 2.  No evidence of choledocholithiasis or obstructive biliopathy.     Transthoracic Echo (tte) Complete    Addendum Date: 4/23/2018    · Mild mitral valve regurgitation. Normal leaflet structure. · Trace aortic valve regurgitation. · Mild tricuspid valve regurgitation with RVSP of 26 mmHg. · Normal-sized left ventricular cavity. Mildly decreased systolic function. Estimated EF = 45%. Diastolic dysfunction consistent with restrictive physiology. Abnormal septal wall motion. · Mildly dilated left atrium. · Mildly dilated right ventricular cavity with mildly reduced systolic function. · Mildly dilated left atrium. The atrial septum is thin. There is no evidence of shunting by color flow doppler or aggitated saline injection. · Severely dilated right atrium.      Ultrasound Abdomen Limited    Result Date: 4/22/2018  IMPRESSION: 1. The gallbladder is contracted, limiting evaluation, demonstrates apparent wall thickening, measuring up to 5 mm. There is a 4 mm gallbladder polyp and trace pericholecystic fluid is present. If there is ongoing clinical concern for acute cholecystitis, hepatobiliary imaging may be considered for further evaluation. 2. Trace right pleural effusion. I certify that I have reviewed this examination and agree with this report. Dr. Jesus Esquivel MD       ASSESSMENT & PLAN     Assessment:  73 y M with no significant PMH presenting for abnormal LFTs, ?nausea, with prior report of abd pain after US revealing thickening GB wall, negative HIDA, MRI revealing normal ducts without acute abnormality; presented initially for pleuritic type chest pain, found to  be in a flutter --> echo revealing dilated Ra, ?liver pathology at this time but no clot in venous system as evidence of MRI; no surgical intervention planned, no WBC, afebrile; ?cardiac etiology causing 2/2 liver abnormalities as evidence of labs; no etoh abuse, new meds, recent ABX  -4/25: pt's AST + ALT up to 500 from 200s; t bili normal, no AI or infectious serologies back; abd slightly more distended; went into rapid A-fib and on drip now; pt frustrated about not knowing what's wrong, symtomatically improved in regards to nausea, abd pain; nontender abdomen, afebrile, +ascites on exam; will consider ?IR to remove fluid; discussed with cardiology -- try gentle diuresis,?restrictive cardiomyopathy; not going home in setting of rapi a-fib on drip and rising LFTs    Recommendations:    - Trend liver enzymes; continue daily while inpatient; unfortunately increased 2x fold from yesterday; need to find either cardiac or liver cause  - Continue low res diet  - T/C IR consult to remove ascitic fluid; however, unsure of what this would yield; will discuss further with Dr. Paniagua  - CMV + EBC titers ordered; r/o other infectious process  - AI and other infectious work up pending   -  Zofran PRN for nausea  - At this point, unsure of cause for rise in LFTs, patient going to get cardiac MRI but this can only be done outpatient; ? Restrictive cardiomyopathy but would be a rare presentation per cardiology; I suspect likely cardiac primary problem but cannot r/o liver in setting of not returned labs  - Discussed with cardiology -- attempt Lasix to diurese but unsure if this will yield much but could try to see if congestion decreasing along with LFTs  - PT/INR ordered for today        CANDACE Bay  4/25/2018  9:59 AM     Attending: His LFTs are worsening.  Since at this point is that this is due to acute hepatic congestion related to noted right sided heart failure.  We ordered viral studies for coxsackievirus which  "could cause a myocarditis as well as occasionally liver involvement.  We will also screen for hemochromatosis which can obviously involve both heart and liver but should not this acute type of decompensation.  Not toxic.  We ordered a diagnostic paracentesis which will prove that this is a \"pressure\" rated phenomena but not the actual etiology.  Ultimately a transjugular liver biopsy may be needed which can include pressure measurements.  This will again just confirm that the primary issue is cardiac rather than liver.    My impression and recommendations discussed with the patient, wife and daughter at the bedside.  They had a number of questions which were addressed.    Mariano Paniagua MD           "

## 2018-04-25 NOTE — PROGRESS NOTES
"    Subjective    Interval History: Back in afib yesterday, now on Cardizem drip. Abdomen feels somewhat distended, no nausea.     Objective    Vital signs in last 24 hours:  Temp:  [36.3 °C (97.3 °F)-36.9 °C (98.4 °F)] 36.7 °C (98 °F)  Heart Rate:  [] 112  Resp:  [18] 18  BP: (119-139)/(67-80) 136/74      Intake/Output Summary (Last 24 hours) at 04/25/18 0934  Last data filed at 04/24/18 2233   Gross per 24 hour   Intake          2082.29 ml   Output                1 ml   Net          2081.29 ml       Physical Exam    /74 (BP Location: Right upper arm, Patient Position: Lying)   Pulse (!) 112   Temp 36.7 °C (98 °F) (Oral)   Resp 18   Ht 1.803 m (5' 11\")   Wt 77.2 kg (170 lb 3.2 oz)   SpO2 97%   BMI 23.74 kg/m²     General Appearance:  Alert, no distress   Head:  Normocephalic, without obvious abnormality, atraumatic   Eyes:  Conjunctiva/corneas clear, EOM's intact   Endocrine: No thyroid enlargement    Lungs:   Clear to auscultation bilaterally, respirations unlabored, no rales, no wheezing   Heart:  Afib, lesley   Abdomen:   Soft, non-tender, no masses, no organomegaly, ?ascites   Vascular: Pulses 2+ and symmetric all extremities, no carotid bruit or jugular vein distention   Musculoskeletal:  Skin: No injury or deformity  Skin color, texture, turgor normal, no rashes or lesions, no cyanosis , trace edema   Extremities: Extremities normal, atraumatic   Behavior/Emotional: Appropriate, cooperative                                                 •  acetaminophen, 650 mg, oral, q4h PRN  •  alum-mag hydroxide-simeth, 30 mL, oral, q4h PRN  •  glucose, 16-32 g of dextrose, oral, PRN **OR** dextrose, 15-30 g of dextrose, oral, PRN **OR** glucagon, 1 mg, intramuscular, PRN **OR** dextrose in water, 25 mL, intravenous, PRN  •  diltiazem, 5 mg/hr, intravenous, Titrated  •  docusate sodium, 100 mg, oral, BID  •  enoxaparin, 1 mg/kg, subcutaneous, q12h CATALINA  •  ketorolac, 15 mg, intravenous, q6h PRN  •  " "melatonin, 5 mg, oral, Nightly PRN  •  metoprolol tartrate, 25 mg, oral, BID  •  ondansetron, 4 mg, intravenous, q8h PRN  •  pantoprazole, 40 mg, oral, Daily  •  sodium chloride, , intravenous, Continuous    Labs  Lab Results   Component Value Date    WBC 8.28 04/25/2018    HGB 12.6 (L) 04/25/2018    HCT 38.8 (L) 04/25/2018     04/25/2018    CHOL 128 04/22/2018    TRIG 38 04/22/2018    HDL 55 04/22/2018     (H) 04/25/2018     (H) 04/25/2018     04/25/2018    K 4.1 04/25/2018     (H) 04/25/2018    CREATININE 1.1 04/25/2018    BUN 22 (H) 04/25/2018    CO2 19 (L) 04/25/2018    TSH 3.90 04/23/2018    INR 1.5 04/24/2018         Imaging  I have independently reviewed the pertinent imaging from the last 24 hrs.    ECG/Telemetry  I have independently reviewed the telemetry. Significant findings include afib, variable rate.    VTE Assessment: I have reassessed and the patient's VTE risk and treatment plan is appropriate.     Abnormal liver enzymes   Assessment & Plan    Further significant increase in LFT's, nl Bili-?etiology, also has ascites by MRI  Further GI eval pending  Increased LFT's out of proportion to \"hepatic congestion\"        Cardiomyopathy (CMS/HCC) (HCC)   Assessment & Plan    EF ~45 with mild BiV dysfunction and severe diastolic dysfunction  Dilated RA    ?restrictive cardiomyopathy-?cause  RA dilatation possibly from increased venous pressure  Pleural effusions/ascites/anasarca    Plan:  Follow volume status  BB.  Consider cardiac MRI as outpt if no etiology found     .                    Right upper quadrant abdominal pain   Assessment & Plan    MRI noted  HIDA negative  LFTs rising, Tbili trending down  See below      Plan:  GI eval         Atrial flutter (CMS/HCC) (HCC)   Assessment & Plan    Back in afib, now on Cardizem drip at 5 mg/hr  Will increase Bb, continue Cardizem drip until rate better controlled or converts to nsr again            Chest pain   Assessment & " Plan      No further cp  follow              Gilbert Abreu MD  4/25/2018

## 2018-04-26 PROBLEM — D53.9 MACROCYTIC ANEMIA: Status: ACTIVE | Noted: 2018-04-26

## 2018-04-26 PROBLEM — R10.11 RIGHT UPPER QUADRANT ABDOMINAL PAIN: Status: RESOLVED | Noted: 2018-04-22 | Resolved: 2018-04-26

## 2018-04-26 LAB
ALBUMIN SERPL-MCNC: 3 G/DL (ref 3.4–5)
ALP SERPL-CCNC: 95 IU/L (ref 35–126)
ALT SERPL-CCNC: 570 IU/L (ref 16–63)
ANA SER QL IA: POSITIVE
ANION GAP SERPL CALC-SCNC: 9 MEQ/L (ref 3–15)
AST SERPL-CCNC: 355 IU/L (ref 15–41)
ATRIAL RATE: 59
BILIRUB SERPL-MCNC: 1 MG/DL (ref 0.3–1.2)
BUN SERPL-MCNC: 17 MG/DL (ref 8–20)
CALCIUM SERPL-MCNC: 8.3 MG/DL (ref 8.9–10.3)
CENTROMERE B AB SER-ACNC: 146 AU/ML
CHLORIDE SERPL-SCNC: 111 MMOL/L (ref 98–109)
CO2 SERPL-SCNC: 21 MMOL/L (ref 22–32)
CREAT SERPL-MCNC: 1.1 MG/DL (ref 0.8–1.3)
DSDNA IGG SER-ACNC: 37 IU/ML
EBV VCA IGM SER IA-ACNC: 0.06
ENA JO1 AB SER-ACNC: 30 AU/ML
ENA RNP AB SER-ACNC: 29 AU/ML
ENA SCL70 AB SER-ACNC: 7 AU/ML
ENA SM AB SER-ACNC: 14 AU/ML
ENA SS-A IGG SER-ACNC: 185 AU/ML
ENA SS-B IGG SER-ACNC: 9 AU/ML
FOLATE SERPL-MCNC: >20 NG/ML
GFR SERPL CREATININE-BSD FRML MDRD: >60 ML/MIN/1.73M*2
GLUCOSE SERPL-MCNC: 91 MG/DL (ref 70–99)
HISTONE IGG SER-ACNC: 28 AU/ML
INR PPP: 1.2 INR
IRON SATN MFR SERPL: 17 % (ref 15–45)
IRON SERPL-MCNC: 49 UG/DL (ref 35–150)
P AXIS: 63
POTASSIUM SERPL-SCNC: 3.8 MMOL/L (ref 3.6–5.1)
PR INTERVAL: 212
PROT SERPL-MCNC: 5.4 G/DL (ref 6–8.2)
PROTHROMBIN TIME: 15.7 SEC. (ref 12.2–14.5)
QRS DURATION: 80
QT INTERVAL: 458
QTC CALCULATION(BAZETT): 453
R AXIS: 83
SODIUM SERPL-SCNC: 141 MMOL/L (ref 136–144)
T WAVE AXIS: 74
TIBC SERPL-MCNC: 290 UG/DL (ref 270–460)
UIBC SERPL-MCNC: 241 UG/DL (ref 180–360)
VENTRICULAR RATE: 59
VIT B12 SERPL-MCNC: 798 PG/ML (ref 180–914)

## 2018-04-26 PROCEDURE — 80053 COMPREHEN METABOLIC PANEL: CPT | Performed by: PHYSICIAN ASSISTANT

## 2018-04-26 PROCEDURE — 86790 VIRUS ANTIBODY NOS: CPT | Performed by: INTERNAL MEDICINE

## 2018-04-26 PROCEDURE — 63700000 HC SELF-ADMINISTRABLE DRUG: Performed by: HOSPITALIST

## 2018-04-26 PROCEDURE — 81256 HFE GENE: CPT | Performed by: INTERNAL MEDICINE

## 2018-04-26 PROCEDURE — 82746 ASSAY OF FOLIC ACID SERUM: CPT | Performed by: INTERNAL MEDICINE

## 2018-04-26 PROCEDURE — 82607 VITAMIN B-12: CPT | Performed by: INTERNAL MEDICINE

## 2018-04-26 PROCEDURE — 36415 COLL VENOUS BLD VENIPUNCTURE: CPT | Performed by: PHYSICIAN ASSISTANT

## 2018-04-26 PROCEDURE — 86658 ENTEROVIRUS ANTIBODY: CPT | Performed by: NURSE PRACTITIONER

## 2018-04-26 PROCEDURE — 99233 SBSQ HOSP IP/OBS HIGH 50: CPT | Performed by: INTERNAL MEDICINE

## 2018-04-26 PROCEDURE — 86747 PARVOVIRUS ANTIBODY: CPT | Performed by: NURSE PRACTITIONER

## 2018-04-26 PROCEDURE — 63700000 HC SELF-ADMINISTRABLE DRUG: Performed by: INTERNAL MEDICINE

## 2018-04-26 PROCEDURE — 86790 VIRUS ANTIBODY NOS: CPT | Performed by: NURSE PRACTITIONER

## 2018-04-26 PROCEDURE — 21400000 HC ROOM AND CARE CCU/INTERMEDIATE

## 2018-04-26 PROCEDURE — 63600000 HC DRUGS/DETAIL CODE: Performed by: PHYSICIAN ASSISTANT

## 2018-04-26 PROCEDURE — 85610 PROTHROMBIN TIME: CPT | Performed by: PHYSICIAN ASSISTANT

## 2018-04-26 RX ORDER — FUROSEMIDE 20 MG/1
20 TABLET ORAL DAILY
Status: DISCONTINUED | OUTPATIENT
Start: 2018-04-26 | End: 2018-04-27 | Stop reason: HOSPADM

## 2018-04-26 RX ADMIN — APIXABAN 5 MG: 5 TABLET, FILM COATED ORAL at 20:01

## 2018-04-26 RX ADMIN — ENOXAPARIN SODIUM 75 MG: 100 INJECTION SUBCUTANEOUS at 09:06

## 2018-04-26 RX ADMIN — METOPROLOL TARTRATE 50 MG: 50 TABLET, FILM COATED ORAL at 20:01

## 2018-04-26 RX ADMIN — DOCUSATE SODIUM 100 MG: 100 CAPSULE, LIQUID FILLED ORAL at 20:01

## 2018-04-26 RX ADMIN — METOPROLOL TARTRATE 50 MG: 50 TABLET, FILM COATED ORAL at 09:06

## 2018-04-26 RX ADMIN — PANTOPRAZOLE SODIUM 40 MG: 40 TABLET, DELAYED RELEASE ORAL at 09:06

## 2018-04-26 RX ADMIN — FUROSEMIDE 20 MG: 20 TABLET ORAL at 11:15

## 2018-04-26 ASSESSMENT — ENCOUNTER SYMPTOMS
DIARRHEA: 0
CONSTITUTIONAL NEGATIVE: 1
PSYCHIATRIC NEGATIVE: 1
RESPIRATORY NEGATIVE: 1
CONSTIPATION: 0
FEVER: 0
WEAKNESS: 0
NEUROLOGICAL NEGATIVE: 1
ABDOMINAL PAIN: 0
EYES NEGATIVE: 1
MUSCULOSKELETAL NEGATIVE: 1
CARDIOVASCULAR NEGATIVE: 1
ALLERGIC/IMMUNOLOGIC NEGATIVE: 1
BLOATING: 0

## 2018-04-26 NOTE — ASSESSMENT & PLAN NOTE
AST ALT trending up (2 fold today) and bilirubin trending down.  Abdomen ultrasound with some gallbladder thickening and trace pericholecystic fluid.  MRI abdomen with no evidence of choledochal lithiasis or obstructive uropathy.  Third spacing with moderate ascites.    Seen by GI   ? Passive congestion   Continue lasix

## 2018-04-26 NOTE — ASSESSMENT & PLAN NOTE
EF ~45 with mild BiV dysfunction and severe diastolic dysfunction  Dilated RA     Possibly tachy mediated from afib  RA dilatation possibly from increased venous pressure  Pleural effusions/ascites/anasarca     Plan:  Follow volume status  For MRI as outpt to further eval for restrictive cm   BB.  Repeat echo in 1 month.

## 2018-04-26 NOTE — PROGRESS NOTES
GI Daily Progress Note           SUBJECTIVE    LOS: 4 days     Interval History: Normal iron studies, other than elevated ferritin in acute phase reactant. IBC normal. Negative Hepatitis, negative Smooth Muscle A, AMA negative, KLESEY not back. Had US last night after lasix and no ascites found on US that was evident on MRI 2 days ago. Other liver enzymes normal and unchanged. Cardiology recommending low dose lasix daily and outpatient follow up. Coxsackie, parvo, and other viral orders pending at this time. Still with b/l effusions on Us. Not eligible for paracentesis this AM to analyze fluid + obtain SAAG.    (slightly down),  (unchanged). No complaints per patient, off cardizem drip per cardiology. All signs pointing towards cardiac source of congestion causing rise in LFTs at this time.     Review of Systems  All other systems were reviewed and are negative oither than as stated in the HPI   OBJECTIVE   Vital signs in last 24 hours:  Temp:  [36.4 °C (97.6 °F)-37.2 °C (98.9 °F)] 36.5 °C (97.7 °F)  Heart Rate:  [64-92] 64  Resp:  [16-18] 16  BP: (107-138)/(60-75) 134/64      Intake/Output Summary (Last 24 hours) at 04/26/18 1009  Last data filed at 04/25/18 2209   Gross per 24 hour   Intake          1371.84 ml   Output             1600 ml   Net          -228.16 ml       Intake/Output this shift:  No intake/output data recorded.   Current Medications:  •  alum-mag hydroxide-simeth, 30 mL, oral, q4h PRN  •  glucose, 16-32 g of dextrose, oral, PRN **OR** dextrose, 15-30 g of dextrose, oral, PRN **OR** glucagon, 1 mg, intramuscular, PRN **OR** dextrose in water, 25 mL, intravenous, PRN  •  diltiazem, 5 mg/hr, intravenous, Titrated  •  docusate sodium, 100 mg, oral, BID  •  enoxaparin, 1 mg/kg, subcutaneous, q12h CATALINA  •  ketorolac, 15 mg, intravenous, q6h PRN  •  melatonin, 5 mg, oral, Nightly PRN  •  metoprolol tartrate, 50 mg, oral, BID  •  ondansetron, 4 mg, intravenous, q8h PRN  •  pantoprazole, 40  mg, oral, Daily    Physical Exam  General appearance: alert, appears stated age and cooperative  Head: normocephalic, without obvious abnormality, atraumatic  Lungs: clear to auscultation bilaterally  Heart: regular rate and rhythm, S1, S2 normal, no murmur, click, rub or gallop  Abdomen: soft, nontender, nondistended, +BS x 4, distention decreased from yesterday, no obvious ascites  Extremities: extremities normal, warm and well-perfused; no cyanosis, clubbing, or edema  Skin: Skin color, texture, turgor normal. No rashes or lesions  Neurologic: Grossly normal     LABS & IMAGING   Labs  I have reviewed the past 24 hour labs  , , bili normal, alk phos normal, No WBC, ferritin elevated (expected as acute phase reactant), hepatitis negative, ASMA negative, AMA negative, KELSEY pending, other virals pending    Results from last 7 days  Lab Units 04/25/18  0440 04/24/18  0453 04/23/18  0305   WBC K/uL 8.28 11.60* 13.26*   HEMOGLOBIN g/dL 12.6* 11.8* 12.8*   HEMATOCRIT % 38.8* 35.8* 38.6*   PLATELETS K/uL 194 187 193       Results from last 7 days  Lab Units 04/26/18  0535 04/25/18  0440 04/24/18  0453   SODIUM mmol/L 141 141 140   POTASSIUM mmol/L 3.8 4.1 4.6   CHLORIDE mmol/L 111* 115* 112*   CO2 mmol/L 21* 19* 21*   BUN mg/dL 17 22* 29*   CREATININE mg/dL 1.1 1.1 1.3   CALCIUM mg/dL 8.3* 8.1* 8.0*   ALBUMIN g/dL 3.0* 2.9* 2.9*   BILIRUBIN TOTAL mg/dL 1.0 1.0 1.5*   ALK PHOS IU/L 95 98 87   ALT IU/L 570* 564* 254*   AST IU/L 355* 593* 270*   GLUCOSE mg/dL 91 81 98       Results from last 7 days  Lab Units 04/26/18  0535   INR INR 1.2       Imaging  I have reviewed the Imaging from the last 24 hrs.    Nm Hepatobiliary    Result Date: 4/23/2018  IMPRESSION: Normal examination. I certify that I have reviewed this examination and agree with this report. Yamilex Daniel MD    X-ray Chest 1 View    Result Date: 4/22/2018  IMPRESSION: No active disease.     Ct Chest Pulmonary Embolism With Iv  Contrast    Result Date: 4/22/2018  IMPRESSION: 1.  Left basilar subsegmental atelectasis. 2.  No evidence of pulmonary embolism to the level of the proximal segmental pulmonary artery. A preliminary report was provided to the Emergency Department by the radiology resident at 3:30 AM on the date of the examination. I certify that I have reviewed this examination and agree with this report. Dinora Bloom MD    Mri Abdomen With And Without Contrast Mrcp    Result Date: 4/23/2018  IMPRESSION: 1.  Third spacing with moderate ascites, partially imaged pleural effusions, anasarca and periportal edema. 2.  No evidence of choledocholithiasis or obstructive biliopathy.     Transthoracic Echo (tte) Complete    Addendum Date: 4/23/2018    · Mild mitral valve regurgitation. Normal leaflet structure. · Trace aortic valve regurgitation. · Mild tricuspid valve regurgitation with RVSP of 26 mmHg. · Normal-sized left ventricular cavity. Mildly decreased systolic function. Estimated EF = 45%. Diastolic dysfunction consistent with restrictive physiology. Abnormal septal wall motion. · Mildly dilated left atrium. · Mildly dilated right ventricular cavity with mildly reduced systolic function. · Mildly dilated left atrium. The atrial septum is thin. There is no evidence of shunting by color flow doppler or aggitated saline injection. · Severely dilated right atrium.      Ultrasound Abdomen Limited    Result Date: 4/25/2018  IMPRESSION: 1.  No evidence of abdominal ascites. No intra-abdominal abnormalities are noted. 2.  Partially imaged bilateral pleural effusions. I certify that I have reviewed this examination and agree with this report. Juan Carlos Montgomery MD     Ultrasound Abdomen Limited    Result Date: 4/22/2018  IMPRESSION: 1. The gallbladder is contracted, limiting evaluation, demonstrates apparent wall thickening, measuring up to 5 mm. There is a 4 mm gallbladder polyp and trace pericholecystic fluid is present. If there is ongoing  clinical concern for acute cholecystitis, hepatobiliary imaging may be considered for further evaluation. 2. Trace right pleural effusion. I certify that I have reviewed this examination and agree with this report. Dr. Jesus Esquivel MD       ASSESSMENT & PLAN       Assessment:  73 y M with no significant PMH presenting for abnormal LFTs, ?nausea, with prior report of abd pain after US revealing thickening GB wall, negative HIDA, MRI revealing normal ducts without acute abnormality; presented initially for pleuritic type chest pain, found to be in a flutter --> echo revealing dilated Ra, ?liver pathology at this time but no clot in venous system as evidence of MRI; no surgical intervention planned, no WBC, afebrile; ?cardiac etiology causing 2/2 liver abnormalities as evidence of labs; no etoh abuse, new meds, recent ABX  -4/25: pt's AST + ALT up to 500 from 200s; t bili normal, no AI or infectious serologies back; abd slightly more distended; went into rapid A-fib and on drip now; pt frustrated about not knowing what's wrong, symtomatically improved in regards to nausea, abd pain; nontender abdomen, afebrile, +ascites on exam; will consider ?IR to remove fluid; discussed with cardiology -- try gentle diuresis,?restrictive cardiomyopathy; not going home in setting of rapi a-fib on drip and rising LFTs  -4/26: pt had US to eval amount of ascites last night, no ascites on this exam --> likely diuresed after Lsaix yetserday; this am abd soft, no new symptoms or recurrent symptoms; AST down in 300s, ALT same; heps, AI negative serologies, pending Coxsackie and other viral panels; suspect primary cardiac source -- > cardiology recommending low dose Lasix outpatient; hopeful would be able to tap to get SAAG; from GI stand point does not need to stay in hospital for LFTs to normalize, they are going in right direction; believe we atleast have the culprit at hand; can f/u outpatient for further studies, do not suspect  hemochromatosis --> DNA marker pending, IBC normal, ferritin elevated but to be expected in this case    Recommendations:    - Continue to trend LFTs; suspect as heart function maximized LFTs will decrease as well; already beginning to decrease  - After DC would have LFTs checked 1 wk s/p DC to check for improvement; as of now no apparent , infectious causes  - Continue low res diet   - CMV + EBC titers ordered; pending but very low yield  - Continue lasix recs per cardiology and cardiology f/u outpatient; suspect this as primary inciting cause for ?cardiomyopathy of some sort --> restrictive (?), coxsackie, other viral pathogens  - Will discuss with Dr. Helms value of obtaining transjugular liver biopsy to confirm cardiac source given everything negative so far; this can be done outpatient as we're suspecting cardiac most likely source   - Discussed with [facility]        CANDACE Bay  4/26/2018  10:09 AM   Attending:  His LFTs are now improving with diuresis, supporting the dx of congestion. This should continue to improve w diuresis and does not necessitate remaining in hospt.  I told him to rpt LFTs Monday and f/u + KELSEY w his   Primary, Dr Kimble.  He will also follow-up results of the coxsackievirus    Mariano Paniagua MD

## 2018-04-26 NOTE — PROGRESS NOTES
Inpatient Progress Note    Patient Name: Alex Gonzalez  Patient MRN: [id number]  Date/Time: 04/26/18 11:22 AM  LOS: 4 day/days  Primary Care Physician: Jeffery Kimble MD        Subjective     The patient was seen and examined by me. Chart was reviewed. Call from IR and there is no fluid to tap. He is feeling better. No Cp       Hospital Course:  Alex Gonzalez is a 73 y.o. male with a past medical history of pneumonia in February, but otherwise no significant medical history who presents with palpitations and pleuritic chest pain which awakened him around midnight.  She was in his usual state of health yesterday, attended the event where he did have a glass of wine, and the beer which is unusual for him, and stated that he felt unwell before he went to bed could not really give this any specific description.  Is awakened around midnight with a sense of pain in his chest which worsened with deep inspiration, and racing heart.  He states that in the past, off and on for much of his life, he has had episodes of racing heart which of only lasted a few seconds and resolved independently, however this episode was prolonged, would not break independently, and was associated with chest pain.  Therefore the patient was brought to the emergency room by his wife where he was found to be in atrial flutter with a rapid ventricular response.  First troponin was 0.06, and the chest x-ray was unremarkable.  A CT of the chest was done which ruled out PE, did show some left basilar atelectasis, and is other lab work was positive for very slight elevation in white blood cell count.  Patient's blood pressure mildly low initially at 97/61 with a heart rate of 150.  Her sugar is now up to 118/78, heart rate is down to 100, this is after giving Cardizem initially, which did not help his heart rate but now after 5 mg of IV Lopressor his heart rate is down to just around 100.  He is being admitted for this chest pain and  arrhythmia.    He was noted tot have increase in LFT and serology was negative.  He did not have enough ascitic fluid for tap.   He was seen by cardiology and converted to sinus on Bb.         Medications    Infusion Medications:      Scheduled Medications:     apixaban 5 mg oral BID   docusate sodium 100 mg oral BID   furosemide 20 mg oral Daily   metoprolol tartrate 50 mg oral BID   pantoprazole 40 mg oral Daily       PRN Medications: •  alum-mag hydroxide-simeth  •  glucose **OR** dextrose **OR** glucagon **OR** dextrose in water  •  melatonin  •  ondansetron          Review of Systems   Constitution: Negative. Negative for fever and weakness.   HENT: Negative.    Eyes: Negative.    Cardiovascular: Negative.    Respiratory: Negative.    Skin: Negative.    Musculoskeletal: Negative.    Gastrointestinal: Negative for bloating, abdominal pain, constipation and diarrhea.   Genitourinary: Negative.    Neurological: Negative.    Psychiatric/Behavioral: Negative.    Allergic/Immunologic: Negative.    All other systems reviewed and are negative.      Objective     Vitals:    04/25/18 2013 04/25/18 2208 04/26/18 0549 04/26/18 0755   BP: 123/72 138/70  134/64   BP Location: Right upper arm Left upper arm  Left upper arm   Patient Position: Sitting Sitting  Lying   Pulse: 73   64   Resp: 18 18  16   Temp: 37 °C (98.6 °F) 37 °C (98.6 °F)  36.5 °C (97.7 °F)   TempSrc: Oral Oral  Oral   SpO2: 96% 98%  98%   Weight:   79.9 kg (176 lb 2 oz)    Height:           Intake/Output Summary (Last 24 hours) at 04/26/18 1122  Last data filed at 04/26/18 0900   Gross per 24 hour   Intake           221.84 ml   Output             1600 ml   Net         -1378.16 ml     I/O last 3 completed shifts:  In: 2668.1 [P.O.:360; I.V.:2208.1; IV Piggyback:100]  Out: 1601 [Urine:1601]  Weight change, 24 hours: Weight change:         Physical Exam   Constitutional: He is oriented to person, place, and time. He appears well-developed and well-nourished.    Pleasant sitting up in chair   HENT:   Head: Normocephalic and atraumatic.   Eyes: EOM are normal. Pupils are equal, round, and reactive to light. No scleral icterus.   Neck: Normal range of motion. Neck supple.   Cardiovascular: Normal rate, regular rhythm and normal heart sounds.    No murmur heard.  Pulmonary/Chest: Effort normal and breath sounds normal.   Abdominal: Soft. Bowel sounds are normal.   Musculoskeletal: He exhibits edema (trace).   Neurological: He is alert and oriented to person, place, and time.   Skin: Skin is warm and dry.   Psychiatric: He has a normal mood and affect. His behavior is normal. Thought content normal.   Vitals reviewed.      Laboratory Studies  Lab Results   Component Value Date    GLUCOSE 91 04/26/2018    CALCIUM 8.3 (L) 04/26/2018     04/26/2018    K 3.8 04/26/2018    CO2 21 (L) 04/26/2018     (H) 04/26/2018    BUN 17 04/26/2018    CREATININE 1.1 04/26/2018       No results found for: HGBA1C    Lab Results   Component Value Date    TSH 3.90 04/23/2018         Lab Results   Component Value Date    WBC 8.28 04/25/2018    HGB 12.6 (L) 04/25/2018    HCT 38.8 (L) 04/25/2018    .0 (H) 04/25/2018     04/25/2018       Microbiology Results     ** No results found for the last 720 hours. **            Lab Results   Component Value Date    INR 1.2 04/26/2018    INR 1.3 04/25/2018    INR 1.5 04/24/2018       Imaging:    Nm Hepatobiliary    Result Date: 4/23/2018  IMPRESSION: Normal examination. I certify that I have reviewed this examination and agree with this report. Yamilex Daniel MD    X-ray Chest 1 View    Result Date: 4/22/2018  IMPRESSION: No active disease.     Ct Chest Pulmonary Embolism With Iv Contrast    Result Date: 4/22/2018  IMPRESSION: 1.  Left basilar subsegmental atelectasis. 2.  No evidence of pulmonary embolism to the level of the proximal segmental pulmonary artery. A preliminary report was provided to the Emergency Department by the  radiology resident at 3:30 AM on the date of the examination. I certify that I have reviewed this examination and agree with this report. Dinora Bloom MD    Mri Abdomen With And Without Contrast Mrcp    Result Date: 4/23/2018  IMPRESSION: 1.  Third spacing with moderate ascites, partially imaged pleural effusions, anasarca and periportal edema. 2.  No evidence of choledocholithiasis or obstructive biliopathy.     Transthoracic Echo (tte) Complete    Addendum Date: 4/23/2018    · Mild mitral valve regurgitation. Normal leaflet structure. · Trace aortic valve regurgitation. · Mild tricuspid valve regurgitation with RVSP of 26 mmHg. · Normal-sized left ventricular cavity. Mildly decreased systolic function. Estimated EF = 45%. Diastolic dysfunction consistent with restrictive physiology. Abnormal septal wall motion. · Mildly dilated left atrium. · Mildly dilated right ventricular cavity with mildly reduced systolic function. · Mildly dilated left atrium. The atrial septum is thin. There is no evidence of shunting by color flow doppler or aggitated saline injection. · Severely dilated right atrium.      Ultrasound Abdomen Limited    Result Date: 4/25/2018  IMPRESSION: 1.  No evidence of abdominal ascites. No intra-abdominal abnormalities are noted. 2.  Partially imaged bilateral pleural effusions. I certify that I have reviewed this examination and agree with this report. Juan Carlos Montgomery MD     Ultrasound Abdomen Limited    Result Date: 4/22/2018  IMPRESSION: 1. The gallbladder is contracted, limiting evaluation, demonstrates apparent wall thickening, measuring up to 5 mm. There is a 4 mm gallbladder polyp and trace pericholecystic fluid is present. If there is ongoing clinical concern for acute cholecystitis, hepatobiliary imaging may be considered for further evaluation. 2. Trace right pleural effusion. I certify that I have reviewed this examination and agree with this report. Dr. Jesus Esquivel,  MD    ECG/Telemetry  Primarily sinus perfecto with non-sustained SVT      Assessment/Plan     Active Problems:    Anxiety    Chest pain    Atrial flutter (CMS/HCC) (HCC)    Cardiomyopathy (CMS/HCC) (HCC)    Abnormal liver enzymes    Macrocytic anemia        Macrocytic anemia   Assessment & Plan    Check folate and B12        Abnormal liver enzymes   Assessment & Plan    AST ALT trending up (2 fold today) and bilirubin trending down.  Abdomen ultrasound with some gallbladder thickening and trace pericholecystic fluid.  MRI abdomen with no evidence of choledochal lithiasis or obstructive uropathy.  Third spacing with moderate ascites.    Seen by GI   ? Passive congestion   Continue lasix         Cardiomyopathy (CMS/HCC) (HCC)   Assessment & Plan    EF ~45 with mild BiV dysfunction and severe diastolic dysfunction  Dilated RA     Possibly tachy mediated from afib  RA dilatation possibly from increased venous pressure  Pleural effusions/ascites/anasarca     Plan:  Follow volume status  For MRI as outpt to further eval for restrictive cm   BB.  Repeat echo in 1 month.             Atrial flutter (CMS/HCC) (HCC)   Assessment & Plan    Pt was in symptomatic 2:1 atrial flutter, converted to SR with improvement in symptoms.   [id number] - 2 (age) - Echo with severely dilated RA  TSH normal   For outpt MRI              Chest pain   Assessment & Plan    Pain improved with toradol and is resolved now after converting to NSR.  Pericarditis possible but not likely- no pericardial abnormality on Echo   CT negative for PE  TN minimally elevated at 0.06. Has stayed flat.   EKG without significant ischemic abn  HIDA negative, MRI abdomen with ascites/periportal edema ,         Improved   Not enough fluid for paracentesis                   VTE prophylaxis: switch to eliquis today       Disposition: home

## 2018-04-26 NOTE — PLAN OF CARE
Problem: Patient Care Overview  Goal: Plan of Care Review  Outcome: Ongoing (interventions implemented as appropriate)   04/26/18 0458   Coping/Psychosocial   Plan Of Care Reviewed With patient   Plan of Care Review   Progress no change       Problem: Fall Risk (Adult)  Goal: Absence of Falls  Outcome: Ongoing (interventions implemented as appropriate)

## 2018-04-26 NOTE — ASSESSMENT & PLAN NOTE
Pain improved with toradol and is resolved now after converting to NSR.  Pericarditis possible but not likely- no pericardial abnormality on Echo   CT negative for PE  TN minimally elevated at 0.06. Has stayed flat.   EKG without significant ischemic abn  HIDA negative, MRI abdomen with ascites/periportal edema ,         Improved   Not enough fluid for paracentesis

## 2018-04-26 NOTE — PLAN OF CARE
Problem: Patient Care Overview  Goal: Plan of Care Review  Outcome: Ongoing (interventions implemented as appropriate)   04/26/18 9378   Coping/Psychosocial   Plan Of Care Reviewed With patient   Plan of Care Review   Progress improving   Outcome Summary Pt converted to NSR last night, cardizem gtt DCd. Will start on OAC tonight.        Problem: Fall Risk (Adult)  Goal: Identify Related Risk Factors and Signs and Symptoms  Outcome: Outcome(s) Achieved Date Met: 04/26/18    Goal: Absence of Falls  Outcome: Ongoing (interventions implemented as appropriate)

## 2018-04-26 NOTE — NURSING NOTE
Pt converted to NSR, HR SB in 50s advised by Dr Jolly to turn off cardizem gtt. Will continue to monitor. EKG complete

## 2018-04-26 NOTE — PROGRESS NOTES
"    Subjective    Interval History: feels improved, no chest pain, no sob,no nausea, back in nsr.     Objective    Vital signs in last 24 hours:  Temp:  [36.4 °C (97.6 °F)-37.2 °C (98.9 °F)] 36.5 °C (97.7 °F)  Heart Rate:  [64-92] 64  Resp:  [16-18] 16  BP: (107-138)/(60-75) 134/64      Intake/Output Summary (Last 24 hours) at 04/26/18 1024  Last data filed at 04/26/18 0900   Gross per 24 hour   Intake          1491.84 ml   Output             1600 ml   Net          -108.16 ml       Physical Exam    /64 (BP Location: Left upper arm, Patient Position: Lying)   Pulse 64   Temp 36.5 °C (97.7 °F) (Oral)   Resp 16   Ht 1.803 m (5' 11\")   Wt 79.9 kg (176 lb 2 oz)   SpO2 98%   BMI 24.56 kg/m²     General Appearance:  Alert, no distress   Head:  Normocephalic, without obvious abnormality, atraumatic   Eyes:  Conjunctiva/corneas clear, EOM's intact   Endocrine: No thyroid enlargement    Lungs:   Clear to auscultation bilaterally, respirations unlabored, no rales, no wheezing   Heart:  Regular rhythm, S1 and S2 normal,  lesley   Abdomen:   Soft, non-tender, no masses, no organomegaly   Vascular: Pulses 2+ and symmetric all extremities, no carotid bruit or jugular vein distention   Musculoskeletal:  Skin: No injury or deformity  Skin color, texture, turgor normal, no rashes or lesions, no cyanosis , trace edema   Extremities: Extremities normal, atraumatic   Behavior/Emotional: Appropriate, cooperative                                                 •  alum-mag hydroxide-simeth, 30 mL, oral, q4h PRN  •  glucose, 16-32 g of dextrose, oral, PRN **OR** dextrose, 15-30 g of dextrose, oral, PRN **OR** glucagon, 1 mg, intramuscular, PRN **OR** dextrose in water, 25 mL, intravenous, PRN  •  diltiazem, 5 mg/hr, intravenous, Titrated  •  docusate sodium, 100 mg, oral, BID  •  enoxaparin, 1 mg/kg, subcutaneous, q12h CATALINA  •  ketorolac, 15 mg, intravenous, q6h PRN  •  melatonin, 5 mg, oral, Nightly PRN  •  metoprolol tartrate, 50 " mg, oral, BID  •  ondansetron, 4 mg, intravenous, q8h PRN  •  pantoprazole, 40 mg, oral, Daily    Labs  Lab Results   Component Value Date    WBC 8.28 04/25/2018    HGB 12.6 (L) 04/25/2018    HCT 38.8 (L) 04/25/2018     04/25/2018    CHOL 128 04/22/2018    TRIG 38 04/22/2018    HDL 55 04/22/2018     (H) 04/26/2018     (H) 04/26/2018     04/26/2018    K 3.8 04/26/2018     (H) 04/26/2018    CREATININE 1.1 04/26/2018    BUN 17 04/26/2018    CO2 21 (L) 04/26/2018    TSH 3.90 04/23/2018    INR 1.2 04/26/2018         Imaging  I have independently reviewed the pertinent imaging from the last 24 hrs.    ECG/Telemetry  I have independently reviewed the telemetry. Significant findings include back in nsr.    VTE Assessment: I have reassessed and the patient's VTE risk and treatment plan is appropriate.     Abnormal liver enzymes   Assessment & Plan    AST improving, gpt no change, feels improved  Etiology still not certain- r/o infiltrative cm, which along with his aflutter on presentation, may have caused decreased perfusion to liver?-should improve  Would continue low dose Lasix 20 mg, follow lfts as outpt        Cardiomyopathy (CMS/HCC) (HCC)   Assessment & Plan    EF ~45 with mild BiV dysfunction and severe diastolic dysfunction  Dilated RA    ?restrictive cardiomyopathy-?cause  RA dilatation possibly from increased venous pressure  Pleural effusions/ascites/anasarca    Plan:  Follow volume status  BB.  Would keep on low dose Lasix  Check cardiac MRI as outpt and office followup     .                    Right upper quadrant abdominal pain   Assessment & Plan    MRI noted  HIDA negative  No further pain        Plan:  GI f/u  Follow lfts as outpt          Atrial flutter (CMS/HCC) (HCC)   Assessment & Plan    Converted to nsr, off Cardizem, continue Bb,would start Eliquis 5 mg bid and dc Lovenox            Chest pain   Assessment & Plan      No further cp  follow              Gilbert D.  MD Brady  4/26/2018

## 2018-04-26 NOTE — ASSESSMENT & PLAN NOTE
Pt was in symptomatic 2:1 atrial flutter, converted to SR with improvement in symptoms.   [id number] - 2 (age) - Echo with severely dilated RA  TSH normal   For outpt MRI

## 2018-04-27 VITALS
DIASTOLIC BLOOD PRESSURE: 68 MMHG | WEIGHT: 176 LBS | TEMPERATURE: 98.5 F | SYSTOLIC BLOOD PRESSURE: 148 MMHG | HEART RATE: 58 BPM | OXYGEN SATURATION: 98 % | BODY MASS INDEX: 24.64 KG/M2 | HEIGHT: 71 IN | RESPIRATION RATE: 16 BRPM

## 2018-04-27 PROBLEM — R07.9 CHEST PAIN: Status: RESOLVED | Noted: 2018-04-22 | Resolved: 2018-04-27

## 2018-04-27 LAB
ACE SERPL-CCNC: 35 U/L (ref 1–60)
ALBUMIN SERPL-MCNC: 2.8 G/DL (ref 3.4–5)
ALP SERPL-CCNC: 97 IU/L (ref 35–126)
ALT SERPL-CCNC: 376 IU/L (ref 16–63)
ANA PAT SER IF-IMP: NORMAL
ANA TITR SER HEP2 SUBST: NEGATIVE {TITER}
ANION GAP SERPL CALC-SCNC: 7 MEQ/L (ref 3–15)
AST SERPL-CCNC: 147 IU/L (ref 15–41)
BASOPHILS # BLD: 0.05 K/UL (ref 0.01–0.1)
BASOPHILS NFR BLD: 0.7 %
BILIRUB DIRECT SERPL-MCNC: 0.2 MG/DL
BILIRUB SERPL-MCNC: 1 MG/DL (ref 0.3–1.2)
BUN SERPL-MCNC: 20 MG/DL (ref 8–20)
CALCIUM SERPL-MCNC: 8.2 MG/DL (ref 8.9–10.3)
CHLORIDE SERPL-SCNC: 112 MMOL/L (ref 98–109)
CO2 SERPL-SCNC: 21 MMOL/L (ref 22–32)
CREAT SERPL-MCNC: 1.1 MG/DL (ref 0.8–1.3)
DIFFERENTIAL METHOD BLD: ABNORMAL
EOSINOPHIL # BLD: 0.82 K/UL (ref 0.04–0.54)
EOSINOPHIL NFR BLD: 12.2 %
ERYTHROCYTE [DISTWIDTH] IN BLOOD BY AUTOMATED COUNT: 13.6 % (ref 11.6–14.4)
GFR SERPL CREATININE-BSD FRML MDRD: >60 ML/MIN/1.73M*2
GLUCOSE SERPL-MCNC: 83 MG/DL (ref 70–99)
HCT VFR BLDCO AUTO: 32 % (ref 40–51)
HGB BLD-MCNC: 10.6 G/DL (ref 13.7–17.5)
IMM GRANULOCYTES # BLD AUTO: 0.04 K/UL (ref 0–0.08)
IMM GRANULOCYTES NFR BLD AUTO: 0.6 %
LYMPHOCYTES # BLD: 1.83 K/UL (ref 1.2–3.5)
LYMPHOCYTES NFR BLD: 27.3 %
MCH RBC QN AUTO: 32.2 PG (ref 28–33.2)
MCHC RBC AUTO-ENTMCNC: 33.1 G/DL (ref 32.2–36.5)
MCV RBC AUTO: 97.3 FL (ref 83–98)
MONOCYTES # BLD: 0.89 K/UL (ref 0.3–1)
MONOCYTES NFR BLD: 13.3 %
NEUTROPHILS # BLD: 3.07 K/UL (ref 1.7–7)
NEUTS SEG NFR BLD: 45.9 %
NRBC BLD-RTO: 0 %
PDW BLD AUTO: 10.5 FL (ref 9.4–12.4)
PLATELET # BLD AUTO: 187 K/UL (ref 150–350)
POTASSIUM SERPL-SCNC: 3.8 MMOL/L (ref 3.6–5.1)
PROT SERPL-MCNC: 5.2 G/DL (ref 6–8.2)
RBC # BLD AUTO: 3.29 M/UL (ref 4.5–5.8)
SODIUM SERPL-SCNC: 140 MMOL/L (ref 136–144)
WBC # BLD AUTO: 6.7 K/UL (ref 3.8–10.5)

## 2018-04-27 PROCEDURE — 36415 COLL VENOUS BLD VENIPUNCTURE: CPT | Performed by: INTERNAL MEDICINE

## 2018-04-27 PROCEDURE — 63700000 HC SELF-ADMINISTRABLE DRUG: Performed by: HOSPITALIST

## 2018-04-27 PROCEDURE — 85025 COMPLETE CBC W/AUTO DIFF WBC: CPT | Performed by: INTERNAL MEDICINE

## 2018-04-27 PROCEDURE — 82248 BILIRUBIN DIRECT: CPT | Performed by: INTERNAL MEDICINE

## 2018-04-27 PROCEDURE — 63700000 HC SELF-ADMINISTRABLE DRUG: Performed by: INTERNAL MEDICINE

## 2018-04-27 PROCEDURE — 80048 BASIC METABOLIC PNL TOTAL CA: CPT | Performed by: INTERNAL MEDICINE

## 2018-04-27 PROCEDURE — 99239 HOSP IP/OBS DSCHRG MGMT >30: CPT | Performed by: INTERNAL MEDICINE

## 2018-04-27 RX ORDER — METOPROLOL TARTRATE 50 MG/1
50 TABLET ORAL 2 TIMES DAILY
Qty: 60 TABLET | Refills: 0 | Status: SHIPPED | OUTPATIENT
Start: 2018-04-27 | End: 2018-05-27 | Stop reason: HOSPADM

## 2018-04-27 RX ORDER — FUROSEMIDE 20 MG/1
20 TABLET ORAL DAILY
Qty: 30 TABLET | Refills: 0 | Status: SHIPPED | OUTPATIENT
Start: 2018-04-28 | End: 2018-05-28

## 2018-04-27 RX ADMIN — DOCUSATE SODIUM 100 MG: 100 CAPSULE, LIQUID FILLED ORAL at 08:47

## 2018-04-27 RX ADMIN — APIXABAN 5 MG: 5 TABLET, FILM COATED ORAL at 08:47

## 2018-04-27 RX ADMIN — PANTOPRAZOLE SODIUM 40 MG: 40 TABLET, DELAYED RELEASE ORAL at 08:47

## 2018-04-27 RX ADMIN — METOPROLOL TARTRATE 50 MG: 50 TABLET, FILM COATED ORAL at 08:47

## 2018-04-27 RX ADMIN — FUROSEMIDE 20 MG: 20 TABLET ORAL at 08:47

## 2018-04-27 NOTE — PLAN OF CARE
Problem: Patient Care Overview  Goal: Plan of Care Review  Outcome: Ongoing (interventions implemented as appropriate)   04/27/18 0422   Coping/Psychosocial   Plan Of Care Reviewed With patient   Plan of Care Review   Progress no change   Outcome Summary SB_SR . No ectopy.       Problem: Fall Risk (Adult)  Goal: Absence of Falls  Outcome: Outcome(s) Achieved Date Met: 04/27/18

## 2018-04-27 NOTE — PROGRESS NOTES
"    Subjective    Interval History: feels improved, still with mild edema, remains nsr.     Objective    Vital signs in last 24 hours:  Temp:  [36.6 °C (97.8 °F)-37.1 °C (98.7 °F)] 36.9 °C (98.5 °F)  Heart Rate:  [56-64] 58  Resp:  [16-20] 16  BP: (118-148)/(64-68) 148/68      Intake/Output Summary (Last 24 hours) at 04/27/18 0921  Last data filed at 04/26/18 1900   Gross per 24 hour   Intake              420 ml   Output              300 ml   Net              120 ml       Physical Exam    BP (!) 148/68 (BP Location: Left upper arm, Patient Position: Sitting)   Pulse (!) 58   Temp 36.9 °C (98.5 °F) (Oral)   Resp 16   Ht 1.803 m (5' 11\")   Wt 79.8 kg (176 lb)   SpO2 98%   BMI 24.55 kg/m²     General Appearance:  Alert, no distress   Head:  Normocephalic, without obvious abnormality, atraumatic   Eyes:  Conjunctiva/corneas clear, EOM's intact   Endocrine: No thyroid enlargement    Lungs:   Clear to auscultation bilaterally, respirations unlabored, no rales, no wheezing   Heart:  Regular rhythm, S1 and S2 normal, lesley,no  rub or gallop   Abdomen:   Soft, non-tender, no masses, no organomegaly   Vascular: Pulses 2+ and symmetric all extremities, no carotid bruit or jugular vein distention   Musculoskeletal:  Skin: No injury or deformity  Skin color, texture, turgor normal, no rashes or lesions, no cyanosis, mild edema   Extremities: Extremities normal, atraumatic   Behavior/Emotional: Appropriate, cooperative                                                 •  alum-mag hydroxide-simeth, 30 mL, oral, q4h PRN  •  apixaban, 5 mg, oral, BID  •  glucose, 16-32 g of dextrose, oral, PRN **OR** dextrose, 15-30 g of dextrose, oral, PRN **OR** glucagon, 1 mg, intramuscular, PRN **OR** dextrose in water, 25 mL, intravenous, PRN  •  docusate sodium, 100 mg, oral, BID  •  furosemide, 20 mg, oral, Daily  •  melatonin, 5 mg, oral, Nightly PRN  •  metoprolol tartrate, 50 mg, oral, BID  •  ondansetron, 4 mg, intravenous, q8h PRN  •  " pantoprazole, 40 mg, oral, Daily    Labs  Lab Results   Component Value Date    WBC 6.70 04/27/2018    HGB 10.6 (L) 04/27/2018    HCT 32.0 (L) 04/27/2018     04/27/2018    CHOL 128 04/22/2018    TRIG 38 04/22/2018    HDL 55 04/22/2018     (H) 04/27/2018     (H) 04/27/2018     04/27/2018    K 3.8 04/27/2018     (H) 04/27/2018    CREATININE 1.1 04/27/2018    BUN 20 04/27/2018    CO2 21 (L) 04/27/2018    TSH 3.90 04/23/2018    INR 1.2 04/26/2018         Imaging  I have independently reviewed the pertinent imaging from the last 24 hrs.    ECG/Telemetry  I have independently reviewed the telemetry. Significant findings include brief svt runs, nsr.    VTE Assessment: I have reassessed and the patient's VTE risk and treatment plan is appropriate.     Abnormal liver enzymes   Assessment & Plan    Continue to improve daily, no sxs  Follow lfts as outpt per primary      Cardiomyopathy (CMS/HCC) (HCC)   Assessment & Plan    EF ~45 with mild BiV dysfunction and severe diastolic dysfunction  Dilated RA    ?restrictive cardiomyopathy-?cause  RA dilatation possibly from increased venous pressure  Pleural effusions/ascites/anasarca- improving    Plan:  Follow volume status  BB.  Would keep on low dose Lasix  Check cardiac MRI as outpt and office followup       Office follow up with Dr. Pérze 5/7 at 9:15 am       Prescription  provided for cardiac MRI along with number for scheduling at [facility].    Ok for dc from cardiac standpoint     .                    Atrial flutter (CMS/HCC) (HCC)   Assessment & Plan    Remains nsr, continue Bb, Eliquis            Chest pain   Assessment & Plan      No further cp  follow              Gilbert Abreu MD  4/27/2018

## 2018-04-27 NOTE — DISCHARGE SUMMARY
Inpatient Discharge Summary    BRIEF OVERVIEW  Admitting Provider:  H&P Notes 3/28/2018 to 4/27/2018     Date of Service Author Author Type Status Note Type File Time    04/22/18 0628 Tiffany Jolly MD Physician Signed H&P 04/22/18 0629          Attending Provider: Jesus Zavala MD Attending phys phone: [phone]  Primary Care Physician at Discharge: Jeffery Kimble [phone]    Admission Date: 4/22/2018     Discharge Date: 4/27/2018    Primary Discharge Diagnosis  Cardiomyopathy (CMS/HCC) (HCC)    Secondary Discharge Diagnosis  Active Hospital Problems    Diagnosis Date Noted   • Cardiomyopathy (CMS/HCC) (HCC) 04/24/2018     Priority: Medium   • Anxiety 04/22/2018     Priority: Medium   • Macrocytic anemia 04/26/2018   • Abnormal liver enzymes 04/24/2018   • Atrial flutter (CMS/HCC) (HCC) 04/22/2018      Resolved Hospital Problems    Diagnosis Date Noted Date Resolved   • Chest pain 04/22/2018 04/27/2018   • Right upper quadrant abdominal pain 04/22/2018 04/26/2018   • Leukocytosis 04/22/2018 04/25/2018       DETAILS OF HOSPITAL STAY    Operative Procedures Performed  none      Consults:   Consult Notes 3/28/2018 to 4/27/2018     Date of Service Author Author Type Status Note Type File Time    04/24/18 0747 Mariano Paniagua MD Physician Signed Consults 04/24/18 1355    04/23/18 0908 Jess Sanchez MD Physician Signed Consults 04/24/18 1103    04/22/18 0920 CANDACE Salcido Physician Assistant Attested Consults 04/22/18 1138          Consult Orders During Admission:  IP CONSULT TO CARDIOLOGY  IP CONSULT TO GENERAL SURGERY  IP CONSULT TO GASTROENTEROLOGY     Procedures: none  Pertinent Test Results: .    Lab Results   Component Value Date    GLUCOSE 83 04/27/2018    CALCIUM 8.2 (L) 04/27/2018     04/27/2018    K 3.8 04/27/2018    CO2 21 (L) 04/27/2018     (H) 04/27/2018    BUN 20 04/27/2018    CREATININE 1.1 04/27/2018     Lab Results   Component Value Date    WBC 6.70  04/27/2018    HGB 10.6 (L) 04/27/2018    HCT 32.0 (L) 04/27/2018    MCV 97.3 04/27/2018     04/27/2018       Lab Results   Component Value Date     (H) 04/27/2018     (H) 04/27/2018    ALKPHOS 97 04/27/2018    BILITOT 1.0 04/27/2018     Imaging  Nm Hepatobiliary    Result Date: 4/23/2018  IMPRESSION: Normal examination. I certify that I have reviewed this examination and agree with this report. Yamilex Daniel MD    X-ray Chest 1 View    Result Date: 4/22/2018  IMPRESSION: No active disease.     Ct Chest Pulmonary Embolism With Iv Contrast    Result Date: 4/22/2018  IMPRESSION: 1.  Left basilar subsegmental atelectasis. 2.  No evidence of pulmonary embolism to the level of the proximal segmental pulmonary artery. A preliminary report was provided to the Emergency Department by the radiology resident at 3:30 AM on the date of the examination. I certify that I have reviewed this examination and agree with this report. Dinora Bloom MD    Mri Abdomen With And Without Contrast Mrcp    Result Date: 4/23/2018  IMPRESSION: 1.  Third spacing with moderate ascites, partially imaged pleural effusions, anasarca and periportal edema. 2.  No evidence of choledocholithiasis or obstructive biliopathy.     Transthoracic Echo (tte) Complete    Addendum Date: 4/23/2018    · Mild mitral valve regurgitation. Normal leaflet structure. · Trace aortic valve regurgitation. · Mild tricuspid valve regurgitation with RVSP of 26 mmHg. · Normal-sized left ventricular cavity. Mildly decreased systolic function. Estimated EF = 45%. Diastolic dysfunction consistent with restrictive physiology. Abnormal septal wall motion. · Mildly dilated left atrium. · Mildly dilated right ventricular cavity with mildly reduced systolic function. · Mildly dilated left atrium. The atrial septum is thin. There is no evidence of shunting by color flow doppler or aggitated saline injection. · Severely dilated right atrium.      Ultrasound  Abdomen Limited    Result Date: 4/25/2018  IMPRESSION: 1.  No evidence of abdominal ascites. No intra-abdominal abnormalities are noted. 2.  Partially imaged bilateral pleural effusions. I certify that I have reviewed this examination and agree with this report. Juan Carlos Montgomery MD     Ultrasound Abdomen Limited    Result Date: 4/22/2018  IMPRESSION: 1. The gallbladder is contracted, limiting evaluation, demonstrates apparent wall thickening, measuring up to 5 mm. There is a 4 mm gallbladder polyp and trace pericholecystic fluid is present. If there is ongoing clinical concern for acute cholecystitis, hepatobiliary imaging may be considered for further evaluation. 2. Trace right pleural effusion. I certify that I have reviewed this examination and agree with this report. Dr. Jesus Esquivel MD    .  Presenting Problem/History of Present Illness  Chest pain, unspecified type       Exam on Day of Discharge  Patient seen and examined on day of discharge.  Physical Exam   Constitutional: He is oriented to person, place, and time. He appears well-developed and well-nourished.   Speaking in full sentences   HENT:   Head: Normocephalic and atraumatic.   Eyes: Pupils are equal, round, and reactive to light.   Neck: Normal range of motion. Neck supple.   Cardiovascular: Normal rate, regular rhythm and normal heart sounds.    No murmur heard.  Pulmonary/Chest: Effort normal and breath sounds normal.   Abdominal: Soft. Bowel sounds are normal.   Musculoskeletal: Normal range of motion.   Neurological: He is alert and oriented to person, place, and time.   Skin: Skin is warm and dry.   Psychiatric: He has a normal mood and affect. His behavior is normal. Thought content normal.       Hospital Course    Alex Gonzalez is a 73 y.o. male with a past medical history of pneumonia in February, but otherwise no significant medical history who presents with palpitations and pleuritic chest pain which awakened him around midnight. He was  in his usual state of health yesterday, attended the event where he did have a glass of wine, and the beer which is unusual for him, and stated that he felt unwell before he went to bed could not really give this any specific description.  Is awakened around midnight with a sense of pain in his chest which worsened with deep inspiration, and racing heart.  He states that in the past, off and on for much of his life, he has had episodes of racing heart which of only lasted a few seconds and resolved independently, however this episode was prolonged, would not break independently, and was associated with chest pain.  Therefore the patient was brought to the emergency room by his wife where he was found to be in atrial flutter with a rapid ventricular response.  First troponin was 0.06, and the chest x-ray was unremarkable.  A CT of the chest was done which ruled out PE, did show some left basilar atelectasis, and is other lab work was positive for very slight elevation in white blood cell count.  Patient's blood pressure mildly low initially at 97/61 with a heart rate of 150.  Her sugar is now up to 118/78, heart rate is down to 100, this is after giving Cardizem initially, which did not help his heart rate but now after 5 mg of IV Lopressor his heart rate is down to just around 100.     He was noted to have increase in LFT and serology was negative.  There was the possibility that he could have passed a stone.   His LFT w/u was negative and they were decreasing.   He did not have enough ascitic fluid for tap.   He was seen by cardiology and converted to sinus on BB and he was placed on apixaban.       Discharge Orders  Released Discharge Orders     Order Details Provider Status    alum-mag hydroxide-simeth (MAALOX) 200-200-20 mg/5 mL suspension 30 mL 30 mL, oral, Every 4 hours PRN, indigestion, Starting Sun 4/22/18 at 0634, For 90 days** Shake well before administration ** Jesus Zavala MD Do Not Order at  Discharge    apixaban (ELIQUIS) 5 mg tablet Take 1 tablet (5 mg total) by mouth 2 (two) times a day. Jesus Zavala MD Prescribed    dextrose 40 % oral gel 15-30 g of dextrose 15-30 g of dextrose, oral, As needed, low blood sugar, Blood Glucose <= 70, Starting Sun 4/22/18 at 0631, For 90 days* If unable to chew but able to take PO * Hypoglycemia (Adult)  Blood Glucose 51 mg/dL - 70 mg/dL -- Administer 15 grams of simple carbohydrates (1 tube of glucose gel) Blood Glucose <= 50 mg/dL -- Administer 30 grams of simple carbohydrates (2 tubes of glucose gel) Jesus Zavala MD Do Not Order at Discharge    dextrose in water injection 12.5 g 12.5 g (25 mL), intravenous, As needed, low blood sugar, Blood Glucose <= 70, Starting Sun 4/22/18 at 0631, For 90 days* If NPO or unable to swallow and has IV access * Jesus Zavala MD Do Not Order at Discharge    docusate sodium (COLACE) capsule 100 mg 100 mg, oral, 2 times daily, First dose on Sun 4/22/18 at 0900, For 90 daysHold for diarrhea Jesus Zavala MD Do Not Order at Discharge    furosemide (LASIX) 20 mg tablet Take 1 tablet (20 mg total) by mouth daily. Jesus Zavala MD Prescribed    glucagon (GLUCAGEN) injection 1 mg 1 mg, intramuscular, As needed, low blood sugar, Blood Glucose <= 70, Starting Sun 4/22/18 at 0631, For 90 days* If NPO or unable to swallow and does not have IV access * Jesus Zavala MD Do Not Order at Discharge    glucose chewable tablet 16-32 g of dextrose 16-32 g of dextrose, oral, As needed, low blood sugar, Blood Glucose <= 70, Starting Sun 4/22/18 at 0631, For 90 days* If able to take PO * Hypoglycemia (Adult)  Blood Glucose 51 mg/dL - 70 mg/dL -- Administer 16 grams of simple carbohydrates (4 glucose tablets) Blood Glucose <= 50 mg/dL -- Administer 32 grams of simple carbohydrates (8 glucose tablets) Jesus Zavala MD Do Not Order at Discharge    melatonin capsule 5 mg 5 mg, oral, Nightly PRN, insomnia, Starting Sun 4/22/18 at  0630, For 90 days Jesus Zavala MD Do Not Order at Discharge    metoprolol tartrate (LOPRESSOR) 50 mg tablet Take 1 tablet (50 mg total) by mouth 2 (two) times a day. Jesus Zavala MD Prescribed    ondansetron (ZOFRAN) injection 4 mg 4 mg, intravenous, Every 8 hours PRN, nausea, vomiting, Nausea/Vomiting, Starting Sun 4/22/18 at 0634, For 90 daysIf unable to take oral medications Jesus Zavala MD Do Not Order at Discharge    pantoprazole (PROTONIX) tablet,delayed release (DR/EC) 40 mg 40 mg, oral, Daily, First dose on Sun 4/22/18 at 0900, For 90 days** Do not crush, chew, or khan **  Jesus Zavala MD Do Not Order at Discharge    Post-Discharge Activity: Normal activity as tolerated. RoutineNormal activity as tolerated. Jesus Zavala MD New at Discharge    Diet Routine, Normal Jesus Zavala MD New at Discharge    Call provider for:  temperature >100.4 Routine Jesus Zavala MD New at Discharge    Call provider for:  persistent nausea or vomiting Routine Jesus Zavala MD New at Discharge    Call provider for:  difficulty breathing, headache or visual disturbances Routine Jesus Zavala MD New at Discharge    Call provider for:  persistent dizziness or light-headedness Routine Jesus Zavala MD New at Discharge    Follow-up with primary care provider Instructions for follow-up: Dr. Kimble in 1 week Jesus Zavala MD New at Discharge    Other Follow-up Instructions for follow-up: cardiology as indictated in instructions Jesus Zavala MD New at Discharge    Basic metabolic panel Future, Expected: 5/4/2018, Expires: 4/27/2019, Lab Collect, Specimen Sources -  Blood, Venous;, Specimen Types -  Blood;, Resulting formerly [facility] LAB, New collection Jesus Zavala MD New at Discharge    Hepatic function panel Future, Expected: 5/4/2018, Expires: 4/27/2019, Lab Collect, Specimen Sources -  Blood, Venous;, Specimen Types -  Blood;, Resulting formerly [facility] LAB, New collection Jesus FREITAS  MD Sally New at Discharge          Outpatient Follow-Ups  No future appointments.  Referrals:  No orders of the defined types were placed in this encounter.      Active Issues Requiring Follow-up  Issue: further eval for restrictive CM   What is Needed: cardiac MRI       Test Results Pending at Discharge  Unresulted Labs     Start     Ordered    04/27/18 0000  Basic metabolic panel     Question Answer Comment   Has the patient fasted? No    Which Provider would you like to Cc? FER PRESTON    Which Provider would you like to Cc? FER CONNOLLY        04/27/18 1052    04/27/18 0000  Hepatic function panel     Question Answer Comment   Which Provider would you like to Cc? FER CONNOLLY    Which Provider would you like to Cc? FER PRESTON        04/27/18 1053    04/26/18 1252  KELSEY titer  Once      04/26/18 1251    04/26/18 0600  Hepatitis E Virus (HEV) Antibody (IgM)  Once      04/25/18 1615    04/26/18 0600  Hereditary Hemochromatosis DNA Mutation Analysis  Once      04/25/18 1615    04/25/18 1613  Herpesvirus 6 Antibodies (IgG, IgM)  Once      04/25/18 1616    04/25/18 1612  Parvovirus B19 Antibodies (IgG, IgM)  Once      04/25/18 1616    04/25/18 1612  Parvovirus B19 IgM Ab  PROCEDURE ONCE      04/25/18 2045    04/25/18 1612  Parvovirus B19 IgG Ab  PROCEDURE ONCE      04/25/18 2045    04/25/18 1611  Coxsackie B (1-6) Antibodies, Serum  Once      04/25/18 1616    04/25/18 1200  Cytology, Fluids  (Paracentesis Lab Panel)  [facility]     Question:  Specimen Source  Answer:  Peritoneal Fluid  Comment:  Ascitic fluid    04/25/18 1201    04/25/18 1019  CMV Antibody Panel  Once      04/25/18 1018    Pending  Troponin I  Now then every 6 hours     Comments:  Time from ER study      Pending    Pending  Lipid panel - Stat  Morning draw      Pending    Pending  Basic metabolic panel  Once      Pending    Pending  Magnesium  Once      Pending    Pending  TSH  Once      Pending    Pending  Troponin I  Every 6 hours       Pending    Pending  CBC and differential  Once      Pending    Pending  CBC - Stat  Morning draw     Comments:  If not already drawn in the ED.      Pending    Pending  Protime-INR  Morning draw      Pending    Pending  PTT  Morning draw      Pending    Pending  Urinalysis  Once      Pending            Discharge Disposition  Home  Code Status at Discharge: Full Code  Physician Order for Life-Sustaining Treatment Document Status      No documents found              >30 minutes spent in total d/c preparation, communication and coordination of care.

## 2018-04-27 NOTE — PROGRESS NOTES
GI Daily Progress Note           SUBJECTIVE    LOS: 5 days     Interval History: AST + ALT decreasing appropriately. No symptoms still. Tolerating diet. Abdomen soft, nondistended. Being Dc'ed on Eliquia. Getting outpatient cardiac MRI. Likely for DC today. Ok for LFT follow by primary. Left # for patient to call if ?s. No other complaints per patient, still suspect cardiac source as reason for elevated LFTs.     Review of Systems  All other systems were reviewed and are negative oither than as stated in the HPI   OBJECTIVE   Vital signs in last 24 hours:  Temp:  [36.6 °C (97.8 °F)-37.1 °C (98.7 °F)] 36.9 °C (98.5 °F)  Heart Rate:  [56-64] 58  Resp:  [16-20] 16  BP: (118-148)/(64-68) 148/68      Intake/Output Summary (Last 24 hours) at 04/27/18 1122  Last data filed at 04/27/18 1000   Gross per 24 hour   Intake              420 ml   Output             1300 ml   Net             -880 ml       Intake/Output this shift:  I/O this shift:  In: -   Out: 1000 [Urine:1000]   Current Medications:  •  alum-mag hydroxide-simeth, 30 mL, oral, q4h PRN  •  apixaban, 5 mg, oral, BID  •  glucose, 16-32 g of dextrose, oral, PRN **OR** dextrose, 15-30 g of dextrose, oral, PRN **OR** glucagon, 1 mg, intramuscular, PRN **OR** dextrose in water, 25 mL, intravenous, PRN  •  docusate sodium, 100 mg, oral, BID  •  furosemide, 20 mg, oral, Daily  •  melatonin, 5 mg, oral, Nightly PRN  •  metoprolol tartrate, 50 mg, oral, BID  •  ondansetron, 4 mg, intravenous, q8h PRN  •  pantoprazole, 40 mg, oral, Daily  •  apixaban  •  [START ON 4/28/2018] furosemide  •  metoprolol tartrate    Physical Exam  General appearance: alert, appears stated age and cooperative  Head: normocephalic, without obvious abnormality, atraumatic  Lungs: clear to auscultation bilaterally  Heart: regular rate and rhythm, S1, S2 normal, no murmur, click, rub or gallop  Abdomen: soft, nontender, nondistended, +BSx4  Extremities: extremities normal, warm and well-perfused;  no cyanosis, clubbing, or edema  Skin: Skin color, texture, turgor normal. No rashes or lesions  Neurologic: Grossly normal     LABS & IMAGING   Labs  I have reviewed the past 24 hour labs  , , all other LFTs within normal limits    Results from last 7 days  Lab Units 04/27/18  0528 04/25/18  0440 04/24/18  0453   WBC K/uL 6.70 8.28 11.60*   HEMOGLOBIN g/dL 10.6* 12.6* 11.8*   HEMATOCRIT % 32.0* 38.8* 35.8*   PLATELETS K/uL 187 194 187       Results from last 7 days  Lab Units 04/27/18  0528 04/26/18  0535 04/25/18  0440   SODIUM mmol/L 140 141 141   POTASSIUM mmol/L 3.8 3.8 4.1   CHLORIDE mmol/L 112* 111* 115*   CO2 mmol/L 21* 21* 19*   BUN mg/dL 20 17 22*   CREATININE mg/dL 1.1 1.1 1.1   CALCIUM mg/dL 8.2* 8.3* 8.1*   ALBUMIN g/dL 2.8* 3.0* 2.9*   BILIRUBIN TOTAL mg/dL 1.0 1.0 1.0   ALK PHOS IU/L 97 95 98   ALT IU/L 376* 570* 564*   AST IU/L 147* 355* 593*   GLUCOSE mg/dL 83 91 81       Results from last 7 days  Lab Units 04/26/18  0535   INR INR 1.2         Imaging  I have reviewed the Imaging from the last 24 hrs.    Nm Hepatobiliary    Result Date: 4/23/2018  IMPRESSION: Normal examination. I certify that I have reviewed this examination and agree with this report. Yamilex Daniel MD    X-ray Chest 1 View    Result Date: 4/22/2018  IMPRESSION: No active disease.     Ct Chest Pulmonary Embolism With Iv Contrast    Result Date: 4/22/2018  IMPRESSION: 1.  Left basilar subsegmental atelectasis. 2.  No evidence of pulmonary embolism to the level of the proximal segmental pulmonary artery. A preliminary report was provided to the Emergency Department by the radiology resident at 3:30 AM on the date of the examination. I certify that I have reviewed this examination and agree with this report. Dinora Bloom MD    Mri Abdomen With And Without Contrast Mrcp    Result Date: 4/23/2018  IMPRESSION: 1.  Third spacing with moderate ascites, partially imaged pleural effusions, anasarca and periportal  edema. 2.  No evidence of choledocholithiasis or obstructive biliopathy.     Transthoracic Echo (tte) Complete    Addendum Date: 4/23/2018    · Mild mitral valve regurgitation. Normal leaflet structure. · Trace aortic valve regurgitation. · Mild tricuspid valve regurgitation with RVSP of 26 mmHg. · Normal-sized left ventricular cavity. Mildly decreased systolic function. Estimated EF = 45%. Diastolic dysfunction consistent with restrictive physiology. Abnormal septal wall motion. · Mildly dilated left atrium. · Mildly dilated right ventricular cavity with mildly reduced systolic function. · Mildly dilated left atrium. The atrial septum is thin. There is no evidence of shunting by color flow doppler or aggitated saline injection. · Severely dilated right atrium.      Ultrasound Abdomen Limited    Result Date: 4/25/2018  IMPRESSION: 1.  No evidence of abdominal ascites. No intra-abdominal abnormalities are noted. 2.  Partially imaged bilateral pleural effusions. I certify that I have reviewed this examination and agree with this report. Juan Carlos Montgomery MD     Ultrasound Abdomen Limited    Result Date: 4/22/2018  IMPRESSION: 1. The gallbladder is contracted, limiting evaluation, demonstrates apparent wall thickening, measuring up to 5 mm. There is a 4 mm gallbladder polyp and trace pericholecystic fluid is present. If there is ongoing clinical concern for acute cholecystitis, hepatobiliary imaging may be considered for further evaluation. 2. Trace right pleural effusion. I certify that I have reviewed this examination and agree with this report. Dr. Jesus Esquivel MD         ASSESSMENT & PLAN     Assessment:    73 y M with no significant PMH presenting for abnormal LFTs, ?nausea, with prior report of abd pain after US revealing thickening GB wall, negative HIDA, MRI revealing normal ducts without acute abnormality; presented initially for pleuritic type chest pain, found to be in a flutter --> echo revealing dilated  Ra, ?liver pathology at this time but no clot in venous system as evidence of MRI; no surgical intervention planned, no WBC, afebrile; ?cardiac etiology causing 2/2 liver abnormalities as evidence of labs; no etoh abuse, new meds, recent ABX  -4/25: pt's AST + ALT up to 500 from 200s; t bili normal, no AI or infectious serologies back; abd slightly more distended; went into rapid A-fib and on drip now; pt frustrated about not knowing what's wrong, symtomatically improved in regards to nausea, abd pain; nontender abdomen, afebrile, +ascites on exam; will consider ?IR to remove fluid; discussed with cardiology -- try gentle diuresis,?restrictive cardiomyopathy; not going home in setting of rapi a-fib on drip and rising LFTs  -4/26: pt had US to eval amount of ascites last night, no ascites on this exam --> likely diuresed after Lsaix yetserday; this am abd soft, no new symptoms or recurrent symptoms; AST down in 300s, ALT same; heps, AI negative serologies, pending Coxsackie and other viral panels; suspect primary cardiac source -- > cardiology recommending low dose Lasix outpatient; hopeful would be able to tap to get SAAG; from GI stand point does not need to stay in hospital for LFTs to normalize, they are going in right direction; believe we atleast have the culprit at hand; can f/u outpatient for further studies, do not suspect hemochromatosis --> DNA marker pending, IBC normal, ferritin elevated but to be expected in this case  -4/27: LFTs improving with continued lasix --> suspect cardiac source but KELSEY+, f/u with PCP; LFTs 1 wk from DC    Recommendations:    - Trend LFTs outpatient   - After DC would have LFTs checked 1 wk s/p DC to check for improvement; as of now no apparent , infectious causes  - Continue low res diet   - F/u outpatient given +KELSEY with PCP  - Continue lasix recs per cardiology and cardiology f/u outpatient; suspect this as primary inciting cause for ?cardiomyopathy of some sort -->  restrictive (?), coxsackie, other viral pathogens  - Ok for DC from GI stand point; Lfts decreasing appropriately            CANDACE Bay  4/27/2018  11:22 AM     Attending: LFTs are markedly improved, reflecting improvement in right-sided heart failure with diuresis.  I told him to have his LFTs rechecked next week by his primary physician to ensure normalization as well as to check the pending results of the coxsackie virus blood test.  He will otherwise set up a screening colonoscopy with my office when Dr. Abreu feels it is safe for him to hold his Eliquis for 2 days.  Please call with questions.    Mariano Paniagua MD

## 2018-04-27 NOTE — NURSING NOTE
Transition in Care Heart Failure Navigation Visit    Mr Gonzalez admitted with new Aflutter RVR with successful conversion to NSR with Cardizem drip and BB    Also new diagnosis of Cardiomyopathy ?etiology    Echo report with mild BiV dysfunction and severe diastolic dysfunction, EF 45%, dilated RA    Heart Failure education session with patient today.  Patient  receptive to education and engaging in conversation.     Patient given red HF education folder with weight calendar and HF workbook.  also given HF bag today with scale, pillbox and fluid measuring bottle.       Discussed daily weight, first AM after voiding and rationale. Advised to call Dr Pérez if weight >/=3# 1 day or 5#/1 week. Patient states he is agreeable to start weighing daily. Hospital weights marked on weight calendar.     Also discussed low sodium (2000mg) diet. Instructed to avoid/limit packaged, processed and canned foods due to high sodium content. Encouraged to read food labels. Mr Gonzalez said he does not use salt and he and wife eat very healthy.  Reviewed 64 oz fluid limit and rationale.     Reviewed s/sx HF and rationale.  Used HF zone magnet as teaching tool. Reinforced importance of early recognition and action plan--call Dr Pérez immediately for s/sx in yellow zone      Reinforced the importance of medication compliance for heart failure management.  Reports he has never been on a diuretic previously but understands that he will be discharged on Lasix and rationale.      Discussed cardiac wellness, OP HF education/support sessions;handouts given.  Patient did not seem interested in these programs at this time.  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   Declines home care services.  Reports wife is very supportive and does not feel he has any needs.     Has a script for OP  Cardiac MRI to schedule at [facility].  Also follow-up appt with Dr Pérez has been scheduled for May 7th.       I will f/u with 48 hr post discharge phone  contact.

## 2018-04-27 NOTE — DISCHARGE INSTRUCTIONS
You are scheduled for follow up with Dr. Pérez of cardiology on May 7 at 9:15 for an echo and an office visit.  His office is located at [address] [facility], [facility] [place].  [facility].  Please call [phone] with any questions or concerns.       You will also need a cardiac MRI- these are done at [facility].  A prescription was provided at discharge with a number to call to schedule.  Please hold on to your prescription as you will need to take this with you to the appointment.      Call to f/u with Dr. Paniagua - Gastroenterology    Mariano Paniagua   Physician   Specialty   Gastroenterology, Internal Medicine   Primary Contact Information     Phone Fax E-mail Address   [phone] Not available. [address]    [address]    [place] [address]     You were started on a water pill, lasix (furosemide 20mg) daily in the am   Metoprolol 50mg twice a day   Abixaban 5mg twice a day - this is a blood thinner.    Thank you for allowing us to care for your health care needs.

## 2018-04-30 ENCOUNTER — PATIENT OUTREACH (OUTPATIENT)
Dept: CASE MANAGEMENT | Facility: CLINIC | Age: 73
End: 2018-04-30

## 2018-04-30 NOTE — PROGRESS NOTES
NAME: Alex Gonzalez    MRN: [id number]    YOB: 1945    EVENT DETAILS    Discharging Facility: [facility]  Date of Admission: 04/22/18  Date of Discharge: 04/27/18  Discharge Instructions Reviewed?: Yes     Reason for Admission:  Heart Muscle Disorder    HPI: Spoke with patient today, states he presented to [facility] ED with symptoms of Palpations, chest pain, which had awakened him from sleep.  Pain was worsened by deep inspirations, and he felt as if his heart was racing; spouse took him to Ed.  Patient was treated for PNA in Feb 2018.  ED patient presented with A Flutter with ventricular response. B/P 97/61 .  CT ruled out Pe, + left basilar atelectasis.  Patient labs resulted in increased LFT; possibly passed a stone.  Currently patient feels well, no complaints; this past Sunday patient called cardio on call, he felt again like his heart was racing and was told to take an additional dose of Metoprolol.  Patient clearly understands when to call MD and when to return to Ed.  .    MEDICATION REVIEW:    Reported by:: Patient  Prescriptions Filled?: Yes     Was a medication discrepancy indentified?: No     Medication understanding?: Yes     Medication Adherence?: Yes     Any side effects from medication?: No       Medication review Reviewed, see medication history    Additional Comments: N/A Patient taking medications as directed.      FOLLOW-UP TESTS/PROCEDURES:    DR Kimble 05/04/2018    DR Pérez 05/07/2018    DR Paniagua call to schedule    BMP and Hepatic Panel 05/04/2018      BARRIERS TO CARE    Self-Care   Living Arrangement: Spouse or Significant Other       Socioeconomic  Financial Problems?: No     Transportation Issues?: No        PLAN OF CARE:    Reviewed signs/symptoms of worsening condition or complication that necessitate a call to the Physician's office.  Educated patient on access to care.  RN phone number given for future care management needs.       Sol Camara, RN BSN  [facility]  [phone]

## 2018-05-01 LAB — HFE GENE MUT ANL BLD/T: NORMAL

## 2018-05-02 LAB
B19V IGG SER IA-ACNC: 6.5
B19V IGM SER IA-ACNC: 0.2
CMV IGG SERPL IA-ACNC: 0.12
CMV IGM SERPL IA-ACNC: 0.07 ISR
CV B1 AB TITR SER CF: NORMAL {TITER}
CV B2 AB TITR SER CF: NORMAL {TITER}
CV B3 AB TITR SER CF: NORMAL {TITER}
CV B4 AB TITR SER CF: NORMAL {TITER}
CV B5 AB TITR SER CF: NORMAL {TITER}
CV B6 AB TITR SER CF: NORMAL {TITER}
HEV IGM SER QL: NOT DETECTED
HHV6 IGG TITR SER IF: ABNORMAL {TITER}
HHV6 IGG+IGM SER-IMP: ABNORMAL
HHV6 IGM TITR SER IF: ABNORMAL {TITER}

## 2018-05-04 ENCOUNTER — OFFICE VISIT (OUTPATIENT)
Dept: INTERNAL MEDICINE | Facility: CLINIC | Age: 73
End: 2018-05-04
Payer: MEDICARE

## 2018-05-04 VITALS
HEART RATE: 67 BPM | RESPIRATION RATE: 14 BRPM | TEMPERATURE: 98.7 F | SYSTOLIC BLOOD PRESSURE: 116 MMHG | DIASTOLIC BLOOD PRESSURE: 68 MMHG | WEIGHT: 167.8 LBS | HEIGHT: 71 IN | OXYGEN SATURATION: 97 % | BODY MASS INDEX: 23.49 KG/M2

## 2018-05-04 DIAGNOSIS — I42.9 CARDIOMYOPATHY, UNSPECIFIED TYPE (CMS/HCC): ICD-10-CM

## 2018-05-04 DIAGNOSIS — I48.92 ATRIAL FLUTTER, UNSPECIFIED TYPE (CMS/HCC): Primary | ICD-10-CM

## 2018-05-04 DIAGNOSIS — R74.8 ABNORMAL LIVER ENZYMES: ICD-10-CM

## 2018-05-04 DIAGNOSIS — N40.0 BENIGN PROSTATIC HYPERPLASIA WITHOUT LOWER URINARY TRACT SYMPTOMS: ICD-10-CM

## 2018-05-04 DIAGNOSIS — Z00.00 PREVENTATIVE HEALTH CARE: ICD-10-CM

## 2018-05-04 DIAGNOSIS — D53.9 MACROCYTIC ANEMIA: ICD-10-CM

## 2018-05-04 DIAGNOSIS — R76.8 ANA POSITIVE: ICD-10-CM

## 2018-05-04 PROBLEM — Z87.01 HISTORY OF PNEUMONIA: Status: ACTIVE | Noted: 2018-05-04

## 2018-05-04 PROBLEM — Z85.828 HX OF NONMELANOMA SKIN CANCER: Status: ACTIVE | Noted: 2018-05-04

## 2018-05-04 LAB
ALBUMIN SERPL-MCNC: 3.6 G/DL (ref 3.4–5)
ALP SERPL-CCNC: 111 IU/L (ref 35–126)
ALT SERPL-CCNC: 204 IU/L (ref 16–63)
AST SERPL-CCNC: 111 IU/L (ref 15–41)
BASOPHILS # BLD: 0.08 K/UL (ref 0.01–0.1)
BASOPHILS NFR BLD: 1.1 %
BILIRUB DIRECT SERPL-MCNC: 0.2 MG/DL
BILIRUB SERPL-MCNC: 0.8 MG/DL (ref 0.3–1.2)
DIFFERENTIAL METHOD BLD: NORMAL
EOSINOPHIL # BLD: 0.4 K/UL (ref 0.04–0.54)
EOSINOPHIL NFR BLD: 5.4 %
ERYTHROCYTE [DISTWIDTH] IN BLOOD BY AUTOMATED COUNT: 13.9 % (ref 11.6–14.4)
HCT VFR BLDCO AUTO: 39.5 % (ref 40–51)
HGB BLD-MCNC: 12.3 G/DL (ref 13.7–17.5)
IMM GRANULOCYTES # BLD AUTO: 0.03 K/UL (ref 0–0.08)
IMM GRANULOCYTES NFR BLD AUTO: 0.4 %
LYMPHOCYTES # BLD: 1.46 K/UL (ref 1.2–3.5)
LYMPHOCYTES NFR BLD: 19.6 %
MCH RBC QN AUTO: 32.5 PG (ref 28–33.2)
MCHC RBC AUTO-ENTMCNC: 31.1 G/DL (ref 32.2–36.5)
MCV RBC AUTO: 104.2 FL (ref 83–98)
MONOCYTES # BLD: 0.73 K/UL (ref 0.3–1)
MONOCYTES NFR BLD: 9.8 %
NEUTROPHILS # BLD: 4.75 K/UL (ref 1.7–7)
NEUTS SEG NFR BLD: 63.7 %
NRBC BLD-RTO: 0 %
PDW BLD AUTO: 10.4 FL (ref 9.4–12.4)
PLATELET # BLD AUTO: 311 K/UL (ref 150–350)
PROT SERPL-MCNC: 6.1 G/DL (ref 6–8.2)
PSA SERPL-MCNC: 3.51 NG/ML
RBC # BLD AUTO: 3.79 M/UL (ref 4.5–5.8)
WBC # BLD AUTO: 7.45 K/UL (ref 3.8–10.5)

## 2018-05-04 PROCEDURE — 99496 TRANSJ CARE MGMT HIGH F2F 7D: CPT | Performed by: INTERNAL MEDICINE

## 2018-05-04 PROCEDURE — 84153 ASSAY OF PSA TOTAL: CPT | Performed by: INTERNAL MEDICINE

## 2018-05-04 PROCEDURE — 80076 HEPATIC FUNCTION PANEL: CPT | Performed by: INTERNAL MEDICINE

## 2018-05-04 PROCEDURE — 85025 COMPLETE CBC W/AUTO DIFF WBC: CPT | Performed by: INTERNAL MEDICINE

## 2018-05-04 ASSESSMENT — ENCOUNTER SYMPTOMS
CHILLS: 0
CONFUSION: 0
FEVER: 0
NAUSEA: 0
EYE PAIN: 0
CHEST TIGHTNESS: 0
FATIGUE: 1
SINUS PRESSURE: 0
VOMITING: 0
FREQUENCY: 0
MYALGIAS: 0
HEMATURIA: 0
NERVOUS/ANXIOUS: 0
COUGH: 0
WHEEZING: 0
DIZZINESS: 0
DIARRHEA: 0
UNEXPECTED WEIGHT CHANGE: 0
RHINORRHEA: 0
BACK PAIN: 0
ABDOMINAL PAIN: 0
WOUND: 0
PALPITATIONS: 0
DYSURIA: 0
WEAKNESS: 0
BLOOD IN STOOL: 0
CONSTIPATION: 0
ADENOPATHY: 0
NECK PAIN: 0
HEADACHES: 0
SHORTNESS OF BREATH: 0
SORE THROAT: 0
SLEEP DISTURBANCE: 0
POLYDIPSIA: 0

## 2018-05-04 NOTE — PROGRESS NOTES
Subjective      Patient ID: Alex Gonzalez is a 73 y.o. male.    Here for hosp f/u. hosp 4/22-27. Pt went to bed the day of admission feelling ill. Best he can describe is chest congestion, feeling that he was coming down with a cold. Woke with chest pressure and racing heart. In the ED he as diag with atrial flutter. His heartrate was controlled and he convered to nsr. His ech was abnl. Low ef with severely dilated ra. hosp course was furtehr complicated by adbl lft's and ascites/edema. Concern for gallbladder/stone. W/u proved neg. Labs tredning in right direction on d/c. sienc home he has feelt ok but more fatigued than usual, much more deconditioned. No cp or racing heart. Compliant with meds.         The following have been reviewed and updated as appropriate in this visit:       Review of Systems   Constitutional: Positive for fatigue. Negative for chills, fever and unexpected weight change.   HENT: Negative for congestion, ear pain, hearing loss, rhinorrhea, sinus pressure and sore throat.    Eyes: Negative for pain and visual disturbance.   Respiratory: Negative for cough, chest tightness, shortness of breath and wheezing.    Cardiovascular: Negative for chest pain, palpitations and leg swelling.   Gastrointestinal: Negative for abdominal pain, blood in stool, constipation, diarrhea, nausea and vomiting.   Endocrine: Negative for cold intolerance, heat intolerance and polydipsia.   Genitourinary: Negative for dysuria, frequency, hematuria and urgency.   Musculoskeletal: Negative for back pain, myalgias and neck pain.   Skin: Negative for rash and wound.   Allergic/Immunologic: Negative for environmental allergies and food allergies.   Neurological: Negative for dizziness, syncope, weakness and headaches.   Hematological: Negative for adenopathy.   Psychiatric/Behavioral: Negative for confusion and sleep disturbance. The patient is not nervous/anxious.          Current Outpatient Prescriptions:   •  apixaban  "(ELIQUIS) 5 mg tablet, Take 1 tablet (5 mg total) by mouth 2 (two) times a day., Disp: 60 tablet, Rfl: 0  •  furosemide (LASIX) 20 mg tablet, Take 1 tablet (20 mg total) by mouth daily., Disp: 30 tablet, Rfl: 0  •  metoprolol tartrate (LOPRESSOR) 50 mg tablet, Take 1 tablet (50 mg total) by mouth 2 (two) times a day., Disp: 60 tablet, Rfl: 0    Allergies:  Patient has no known allergies.    Past Medical History:   Diagnosis Date   • Anxiety 4/22/2018    Patient was moderately anxious on arrival, seems calmer now, denies having a chronic problem   • Atrial flutter (CMS/HCC) (HCC) 4/22/2018    Patient thinks he may have had palpitations off-and-on in his lifetime but they only ever lasted a few seconds and never required him seeking medical advice.     • Benign prostatic hyperplasia without lower urinary tract symptoms 5/4/2018   • Cardiomyopathy (CMS/HCC) (HCC) 4/24/2018   • History of pneumonia 5/4/2018   • Hx of nonmelanoma skin cancer 5/4/2018   • Macrocytic anemia 4/26/2018   • Skin cancer        Past Surgical History:   Procedure Laterality Date   • BASAL CELL CARCINOMA EXCISION         Social History   Substance Use Topics   • Smoking status: Never Smoker   • Smokeless tobacco: Never Used   • Alcohol use 2.4 oz/week     4 Cans of beer per week      Comment: weekends       Objective     Vitals:    05/04/18 1012   BP: 116/68   Pulse: 67   Resp: 14   Temp: 37.1 °C (98.7 °F)   TempSrc: Oral   SpO2: 97%   Weight: 76.1 kg (167 lb 12.8 oz)   Height: 1.803 m (5' 11\")       Physical Exam   Constitutional: He is oriented to person, place, and time. He appears well-developed and well-nourished.   HENT:   Head: Normocephalic and atraumatic.   Right Ear: External ear normal.   Left Ear: External ear normal.   Nose: Nose normal.   Mouth/Throat: Oropharynx is clear and moist and mucous membranes are normal.   Eyes: Conjunctivae and EOM are normal. Pupils are equal, round, and reactive to light.   Neck: Neck supple. "   Cardiovascular: Normal rate, regular rhythm and normal heart sounds.    No murmur heard.  Pulmonary/Chest: Effort normal and breath sounds normal. He has no wheezes.   Abdominal: Soft. Bowel sounds are normal. There is no tenderness.   Musculoskeletal: He exhibits no deformity.   Neurological: He is alert and oriented to person, place, and time. He has normal strength.   Skin: Skin is warm and dry. No rash noted.   Psychiatric: He has a normal mood and affect. His behavior is normal. Thought content normal. Cognition and memory are normal. He does not exhibit a depressed mood.       Assessment/Plan   Atrial flutter, unspecified type (CMS/HCC) (HCC)  (primary encounter diagnosis)  Comment: hosp eval reveiwed in detail wit pt; heart sounds nl today; cont ac and bb; he will see dr garcia on monday; f/u here in1 month    Cardiomyopathy, unspecified type (CMS/HCC) (HCC)  Comment: echo revewied; note abnl rheum studies    Abnormal liver enzymes  Comment: recheck  Plan: Hepatic function panel    Macrocytic anemia  Comment: recheck cbc; b12, folate and iron stuidies nl  Plan: CBC and Differential, CBC, Diff Count    KELSEY positive  Comment: pos ssa  and centroemre; referral to rheum for their opinion  Plan: Ambulatory referral to Rheumatology    Benign prostatic hyperplasia without lower urinary tract symptoms  Comment: psa  Plan: PSA    Preventative health care  Comment: he will f/u with gi

## 2018-05-08 ENCOUNTER — TELEPHONE (OUTPATIENT)
Dept: INTERNAL MEDICINE | Facility: CLINIC | Age: 73
End: 2018-05-08

## 2018-05-08 NOTE — TELEPHONE ENCOUNTER
Pt feelling well. Reviewed labs. lft's cont to improve and hgb is improving as well. psa is nl. He is schedlued for  stress test on Monday. He will call if needed otheriwise i'll see him back next month.

## 2018-05-31 ENCOUNTER — TRANSCRIBE ORDERS (OUTPATIENT)
Dept: SCHEDULING | Age: 73
End: 2018-05-31

## 2018-05-31 DIAGNOSIS — I51.7 CARDIOMEGALY: ICD-10-CM

## 2018-05-31 DIAGNOSIS — R74.8 ACID PHOSPHATASE ELEVATED: Primary | ICD-10-CM

## 2018-05-31 DIAGNOSIS — I48.4 ATYPICAL ATRIAL FLUTTER (CMS/HCC): ICD-10-CM

## 2018-05-31 DIAGNOSIS — I42.9 FAMILIAL CARDIOMYOPATHY (CMS/HCC): ICD-10-CM

## 2018-06-04 ENCOUNTER — OFFICE VISIT (OUTPATIENT)
Dept: INTERNAL MEDICINE | Facility: CLINIC | Age: 73
End: 2018-06-04
Payer: MEDICARE

## 2018-06-04 VITALS
WEIGHT: 163.4 LBS | HEART RATE: 56 BPM | TEMPERATURE: 97.7 F | SYSTOLIC BLOOD PRESSURE: 98 MMHG | BODY MASS INDEX: 22.88 KG/M2 | OXYGEN SATURATION: 98 % | RESPIRATION RATE: 16 BRPM | HEIGHT: 71 IN | DIASTOLIC BLOOD PRESSURE: 62 MMHG

## 2018-06-04 DIAGNOSIS — Z00.00 PREVENTATIVE HEALTH CARE: ICD-10-CM

## 2018-06-04 DIAGNOSIS — R74.8 ABNORMAL LIVER ENZYMES: ICD-10-CM

## 2018-06-04 DIAGNOSIS — D53.9 MACROCYTIC ANEMIA: ICD-10-CM

## 2018-06-04 DIAGNOSIS — I42.9 CARDIOMYOPATHY, UNSPECIFIED TYPE (CMS/HCC): ICD-10-CM

## 2018-06-04 DIAGNOSIS — H53.8 BLURRY VISION, BILATERAL: ICD-10-CM

## 2018-06-04 DIAGNOSIS — I48.92 ATRIAL FLUTTER, UNSPECIFIED TYPE (CMS/HCC): Primary | ICD-10-CM

## 2018-06-04 LAB
ALBUMIN SERPL-MCNC: 3.7 G/DL (ref 3.4–5)
ALP SERPL-CCNC: 68 IU/L (ref 35–126)
ALT SERPL-CCNC: 34 IU/L (ref 16–63)
ANION GAP SERPL CALC-SCNC: 8 MEQ/L (ref 3–15)
AST SERPL-CCNC: 31 IU/L (ref 15–41)
BASOPHILS # BLD: 0.07 K/UL (ref 0.01–0.1)
BASOPHILS NFR BLD: 1.1 %
BILIRUB SERPL-MCNC: 0.7 MG/DL (ref 0.3–1.2)
BUN SERPL-MCNC: 20 MG/DL (ref 8–20)
CALCIUM SERPL-MCNC: 9 MG/DL (ref 8.9–10.3)
CHLORIDE SERPL-SCNC: 105 MMOL/L (ref 98–109)
CO2 SERPL-SCNC: 24 MMOL/L (ref 22–32)
CREAT SERPL-MCNC: 1.2 MG/DL (ref 0.8–1.3)
DIFFERENTIAL METHOD BLD: NORMAL
EOSINOPHIL # BLD: 0.46 K/UL (ref 0.04–0.54)
EOSINOPHIL NFR BLD: 7.4 %
ERYTHROCYTE [DISTWIDTH] IN BLOOD BY AUTOMATED COUNT: 13.2 % (ref 11.6–14.4)
GFR SERPL CREATININE-BSD FRML MDRD: 59.2 ML/MIN/1.73M*2
GLUCOSE SERPL-MCNC: 142 MG/DL (ref 70–99)
HCT VFR BLDCO AUTO: 42.5 % (ref 40–51)
HGB BLD-MCNC: 13.9 G/DL (ref 13.7–17.5)
IMM GRANULOCYTES # BLD AUTO: 0.01 K/UL (ref 0–0.08)
IMM GRANULOCYTES NFR BLD AUTO: 0.2 %
LYMPHOCYTES # BLD: 1.56 K/UL (ref 1.2–3.5)
LYMPHOCYTES NFR BLD: 25 %
MCH RBC QN AUTO: 32.9 PG (ref 28–33.2)
MCHC RBC AUTO-ENTMCNC: 32.7 G/DL (ref 32.2–36.5)
MCV RBC AUTO: 100.5 FL (ref 83–98)
MONOCYTES # BLD: 0.74 K/UL (ref 0.3–1)
MONOCYTES NFR BLD: 11.9 %
NEUTROPHILS # BLD: 3.4 K/UL (ref 1.7–7)
NEUTS SEG NFR BLD: 54.4 %
NRBC BLD-RTO: 0 %
PDW BLD AUTO: 10.4 FL (ref 9.4–12.4)
PLATELET # BLD AUTO: 198 K/UL (ref 150–350)
POTASSIUM SERPL-SCNC: 5.1 MMOL/L (ref 3.6–5.1)
PROT SERPL-MCNC: 5.7 G/DL (ref 6–8.2)
RBC # BLD AUTO: 4.23 M/UL (ref 4.5–5.8)
SODIUM SERPL-SCNC: 137 MMOL/L (ref 136–144)
WBC # BLD AUTO: 6.24 K/UL (ref 3.8–10.5)

## 2018-06-04 PROCEDURE — 80053 COMPREHEN METABOLIC PANEL: CPT | Performed by: INTERNAL MEDICINE

## 2018-06-04 PROCEDURE — 99214 OFFICE O/P EST MOD 30 MIN: CPT | Performed by: INTERNAL MEDICINE

## 2018-06-04 PROCEDURE — 85025 COMPLETE CBC W/AUTO DIFF WBC: CPT | Performed by: INTERNAL MEDICINE

## 2018-06-04 ASSESSMENT — ENCOUNTER SYMPTOMS
UNEXPECTED WEIGHT CHANGE: 0
VOMITING: 0
FEVER: 0
SORE THROAT: 0
MYALGIAS: 0
HEADACHES: 0
WEAKNESS: 0
SLEEP DISTURBANCE: 0
SHORTNESS OF BREATH: 0
CHEST TIGHTNESS: 0
NERVOUS/ANXIOUS: 0
CONFUSION: 0
EYE PAIN: 0
ADENOPATHY: 0
CONSTIPATION: 0
COUGH: 0
CHILLS: 0
BACK PAIN: 0
BLOOD IN STOOL: 0
RHINORRHEA: 0
FATIGUE: 0
DIZZINESS: 0
DIARRHEA: 0
PALPITATIONS: 0
WHEEZING: 0
POLYDIPSIA: 0
FREQUENCY: 0
DYSURIA: 0
ABDOMINAL PAIN: 0
NECK PAIN: 0
WOUND: 0
SINUS PRESSURE: 0
HEMATURIA: 0
NAUSEA: 0

## 2018-06-04 NOTE — PROGRESS NOTES
Subjective      Patient ID: Alex Gonzalez is a 73 y.o. male.    Here for 1 month f/u. He has been gen well. He will stillnte brief epides of palps, gen at night. They will last perhapsa  Few monutes then resolve. No assoc cp, dizzines, sob etc. He completed cardiac pet which looked fine. He will see dr garcia next week. also will see rheum, dr ojeda in a few weeks. He is compliant with meds. Feels his eye get a bit blurry at times. Has noted thsi in past but wonders if new meds are making worse. He has seen dr mathis in past.         The following have been reviewed and updated as appropriate in this visit:       Review of Systems   Constitutional: Negative for chills, fatigue, fever and unexpected weight change.   HENT: Negative for congestion, ear pain, hearing loss, rhinorrhea, sinus pressure and sore throat.    Eyes: Positive for visual disturbance. Negative for pain.   Respiratory: Negative for cough, chest tightness, shortness of breath and wheezing.    Cardiovascular: Negative for chest pain, palpitations and leg swelling.   Gastrointestinal: Negative for abdominal pain, blood in stool, constipation, diarrhea, nausea and vomiting.   Endocrine: Negative for cold intolerance, heat intolerance and polydipsia.   Genitourinary: Negative for dysuria, frequency, hematuria and urgency.   Musculoskeletal: Negative for back pain, myalgias and neck pain.   Skin: Negative for rash and wound.   Allergic/Immunologic: Negative for environmental allergies and food allergies.   Neurological: Negative for dizziness, syncope, weakness and headaches.   Hematological: Negative for adenopathy.   Psychiatric/Behavioral: Negative for confusion and sleep disturbance. The patient is not nervous/anxious.          Current Outpatient Prescriptions:   •  apixaban (ELIQUIS) 5 mg tablet, Take 5 mg by mouth 2 (two) times a day., Disp: , Rfl:   •  furosemide (LASIX) 20 mg tablet, Take 20 mg by mouth daily., Disp: , Rfl:   •  metoprolol  "succinate XL (TOPROL-XL) 50 mg 24 hr tablet, Take 75 mg by mouth 2 (two) times a day., Disp: , Rfl:     Allergies:  Patient has no known allergies.    Past Medical History:   Diagnosis Date   • Anxiety 4/22/2018    Patient was moderately anxious on arrival, seems calmer now, denies having a chronic problem   • Atrial flutter (CMS/HCC) (HCC) 4/22/2018    Patient thinks he may have had palpitations off-and-on in his lifetime but they only ever lasted a few seconds and never required him seeking medical advice.     • Benign prostatic hyperplasia without lower urinary tract symptoms 5/4/2018   • Cardiomyopathy (CMS/HCC) (HCC) 4/24/2018   • History of pneumonia 5/4/2018   • Hx of nonmelanoma skin cancer 5/4/2018   • Macrocytic anemia 4/26/2018   • Skin cancer        Past Surgical History:   Procedure Laterality Date   • BASAL CELL CARCINOMA EXCISION         Social History   Substance Use Topics   • Smoking status: Never Smoker   • Smokeless tobacco: Never Used   • Alcohol use 2.4 oz/week     4 Cans of beer per week      Comment: weekends       Objective     Vitals:    06/04/18 0951   BP: 98/62   BP Location: Left upper arm   Patient Position: Sitting   Pulse: (!) 56   Resp: 16   Temp: 36.5 °C (97.7 °F)   TempSrc: Oral   SpO2: 98%   Weight: 74.1 kg (163 lb 6.4 oz)   Height: 1.803 m (5' 11\")       Physical Exam   Constitutional: He is oriented to person, place, and time. He appears well-developed and well-nourished.   HENT:   Head: Normocephalic and atraumatic.   Right Ear: External ear normal.   Left Ear: External ear normal.   Nose: Nose normal.   Mouth/Throat: Oropharynx is clear and moist and mucous membranes are normal.   Eyes: Conjunctivae and EOM are normal. Pupils are equal, round, and reactive to light.   Neck: Neck supple.   Cardiovascular: Normal rate, regular rhythm and normal heart sounds.    No murmur heard.  Pulmonary/Chest: Effort normal and breath sounds normal. He has no wheezes.   Abdominal: Soft. "   Musculoskeletal: He exhibits no deformity.   Neurological: He is alert and oriented to person, place, and time. He has normal strength.   Skin: Skin is warm and dry. No rash noted.   Psychiatric: He has a normal mood and affect. His behavior is normal. Thought content normal. Cognition and memory are normal. He does not exhibit a depressed mood.       Assessment/Plan   Atrial flutter, unspecified type (CMS/HCC) (HCC)  (primary encounter diagnosis)  Comment: he appears stable; he will see dr garcia next week; cont current regimen    Cardiomyopathy, unspecified type (CMS/HCC) (HCC)  Comment: he will see rheum in a few weeks    Abnormal liver enzymes  Comment: reheck  Plan: Comprehensive metabolic panel    Macrocytic anemia  Comment: recheck cbc  Plan: CBC and Differential, CBC, Diff Count    Blurry vision, bilateral  Comment: he will f/u with optho, dr mathis    [facility]  Comment: he has been in touch with dr dias about his 1st screening colo, will be sched once these cardiac issues are settled; f/u here in 4 montsh for medicare prev

## 2018-06-12 ENCOUNTER — HOSPITAL ENCOUNTER (OUTPATIENT)
Dept: RADIOLOGY | Facility: HOSPITAL | Age: 73
Discharge: HOME | End: 2018-06-12
Attending: INTERNAL MEDICINE
Payer: MEDICARE

## 2018-06-12 DIAGNOSIS — I51.7 CARDIOMEGALY: ICD-10-CM

## 2018-06-12 DIAGNOSIS — R74.8 ACID PHOSPHATASE ELEVATED: ICD-10-CM

## 2018-06-12 DIAGNOSIS — I42.9 FAMILIAL CARDIOMYOPATHY (CMS/HCC): ICD-10-CM

## 2018-06-12 DIAGNOSIS — I48.4 ATYPICAL ATRIAL FLUTTER (CMS/HCC): ICD-10-CM

## 2018-06-12 PROCEDURE — 75561 CARDIAC MRI FOR MORPH W/DYE: CPT

## 2018-06-12 PROCEDURE — A9577 INJ MULTIHANCE: HCPCS | Performed by: INTERNAL MEDICINE

## 2018-06-12 PROCEDURE — 63600000 HC DRUGS/DETAIL CODE: Performed by: INTERNAL MEDICINE

## 2018-06-12 RX ADMIN — GADOBENATE DIMEGLUMINE 16 ML: 529 INJECTION, SOLUTION INTRAVENOUS at 11:52

## 2018-10-25 ENCOUNTER — TELEPHONE (OUTPATIENT)
Dept: INTERNAL MEDICINE | Facility: CLINIC | Age: 73
End: 2018-10-25

## 2018-10-25 NOTE — TELEPHONE ENCOUNTER
He had the first pneumonia vaccine in feb this yr so he needs to wait at least until feb next yr for the second one. He should get the flu shot.

## 2018-10-25 NOTE — TELEPHONE ENCOUNTER
Patient's wife called and is questioning the vaccine for Pneumonia.  Her  had Pneumonia last year and she doesn't what to take any chances.  If you think he should get it, which vaccine should he get.  Any questions Mrs. Gonzalez can be reached at [phone]

## 2018-10-26 NOTE — TELEPHONE ENCOUNTER
Called and spoke directly with the patient and informed him tthat he had the first pneumonia vaccine in February of this year so he needs to wait until Feb next year for the second vaccine.  He should get the flu shot...   Patient sam

## 2018-11-15 NOTE — TELEPHONE ENCOUNTER
PT is curious as to exact where and when he had his first pneumonia shot done.  Would you be able to check?

## 2020-05-19 ENCOUNTER — TELEMEDICINE (OUTPATIENT)
Dept: INTERNAL MEDICINE | Facility: CLINIC | Age: 75
End: 2020-05-19
Payer: MEDICARE

## 2020-05-19 DIAGNOSIS — H91.92 HEARING LOSS OF LEFT EAR, UNSPECIFIED HEARING LOSS TYPE: Primary | ICD-10-CM

## 2020-05-19 PROCEDURE — 99442 PR PHYS/QHP TELEPHONE EVALUATION 11-20 MIN: CPT | Mod: 95 | Performed by: INTERNAL MEDICINE

## 2020-05-19 ASSESSMENT — ENCOUNTER SYMPTOMS
PALPITATIONS: 0
CHILLS: 0
HEADACHES: 0
RHINORRHEA: 0
MYALGIAS: 0
ABDOMINAL PAIN: 0
DIARRHEA: 0
DIZZINESS: 0
DYSURIA: 0
COUGH: 0
CHEST TIGHTNESS: 0
WEAKNESS: 0
CONSTIPATION: 0
BACK PAIN: 0
VOMITING: 0
FEVER: 0
SLEEP DISTURBANCE: 0
SHORTNESS OF BREATH: 0
EYE PAIN: 0
SORE THROAT: 0
NECK PAIN: 0
ADENOPATHY: 0
WHEEZING: 0
WOUND: 0
CONFUSION: 0
POLYDIPSIA: 0
NAUSEA: 0
FATIGUE: 0
UNEXPECTED WEIGHT CHANGE: 0
BLOOD IN STOOL: 0
SINUS PRESSURE: 0
NERVOUS/ANXIOUS: 0
FREQUENCY: 0
HEMATURIA: 0

## 2020-05-19 NOTE — PROGRESS NOTES
Verification of Patient Location:  The patient affirms they are currently located in the following state: [place]    Request for Consent:   Audio Only Encounter   You and I are about to have a telemedicine check-in or visit. This is allowed because you have requested it. This telemedicine visit will be billed to your health insurance or you, if you are self-insured. You understand you will be responsible for any copayments or coinsurances that apply to your telemedicine visit. Before starting our telemedicine visit, I am required to get your consent for this virtual check-in or visit by telemedicine. Do you consent?    Patient Response to Request for Consent:  Yes      Visit Documentation:  Subjective     Patient ID: Alex Gonzalez is a 75 y.o. male.  1945      Pt c/o clogged full left ear x 1 week. No pain, fevers or d/c. He has allergies. It yanelis pops open when he pulls on his ear. He was told he had a lot of wax in ears in the past. No sinus pain. No cough. He is otherwise well.       The following have been reviewed and updated as appropriate in this visit:  Allergies  Meds  Problems       Review of Systems   Constitutional: Negative for chills, fatigue, fever and unexpected weight change.   HENT: Positive for hearing loss. Negative for congestion, ear pain, rhinorrhea, sinus pressure and sore throat.    Eyes: Negative for pain and visual disturbance.   Respiratory: Negative for cough, chest tightness, shortness of breath and wheezing.    Cardiovascular: Negative for chest pain, palpitations and leg swelling.   Gastrointestinal: Negative for abdominal pain, blood in stool, constipation, diarrhea, nausea and vomiting.   Endocrine: Negative for cold intolerance, heat intolerance and polydipsia.   Genitourinary: Negative for dysuria, frequency, hematuria and urgency.   Musculoskeletal: Negative for back pain, myalgias and neck pain.   Skin: Negative for rash and wound.   Allergic/Immunologic:  Negative for environmental allergies and food allergies.   Neurological: Negative for dizziness, syncope, weakness and headaches.   Hematological: Negative for adenopathy.   Psychiatric/Behavioral: Negative for confusion and sleep disturbance. The patient is not nervous/anxious.      Past Medical History:   Diagnosis Date   • Anxiety 4/22/2018    Patient was moderately anxious on arrival, seems calmer now, denies having a chronic problem   • Atrial flutter (CMS/HCC) 4/22/2018    Patient thinks he may have had palpitations off-and-on in his lifetime but they only ever lasted a few seconds and never required him seeking medical advice.     • Benign prostatic hyperplasia without lower urinary tract symptoms 5/4/2018   • Cardiomyopathy (CMS/HCC) (HCC) 4/24/2018   • History of pneumonia 5/4/2018   • Hx of nonmelanoma skin cancer 5/4/2018   • Macrocytic anemia 4/26/2018   • Skin cancer      Current Outpatient Medications on File Prior to Visit   Medication Sig Dispense Refill   • apixaban (ELIQUIS) 5 mg tablet Take 5 mg by mouth 2 (two) times a day.     • furosemide (LASIX) 20 mg tablet Take 20 mg by mouth daily.     • metoprolol succinate XL (TOPROL-XL) 50 mg 24 hr tablet Take 75 mg by mouth 2 (two) times a day.       No current facility-administered medications on file prior to visit.      Patient has no known allergies.        Assessment/Plan   Diagnoses and all orders for this visit:    Hearing loss of left ear, unspecified hearing loss type (Primary)  Comments:  sounds liek wax but also possibel eust tube dysfxn; we discussed hoem earwash in detail; he will come in if thsi perists        Time Spent in Medical Discussion During This Encounter:      15 minutes

## 2020-11-27 ENCOUNTER — TELEPHONE (OUTPATIENT)
Dept: INTERNAL MEDICINE | Facility: CLINIC | Age: 75
End: 2020-11-27

## 2020-11-27 ENCOUNTER — OFFICE VISIT (OUTPATIENT)
Dept: INTERNAL MEDICINE | Facility: CLINIC | Age: 75
End: 2020-11-27
Payer: MEDICARE

## 2020-11-27 VITALS
BODY MASS INDEX: 22.85 KG/M2 | HEIGHT: 71 IN | DIASTOLIC BLOOD PRESSURE: 60 MMHG | SYSTOLIC BLOOD PRESSURE: 120 MMHG | WEIGHT: 163.2 LBS | RESPIRATION RATE: 16 BRPM | OXYGEN SATURATION: 98 % | TEMPERATURE: 97.6 F | HEART RATE: 88 BPM

## 2020-11-27 DIAGNOSIS — H61.23 BILATERAL IMPACTED CERUMEN: Primary | ICD-10-CM

## 2020-11-27 PROCEDURE — 99213 OFFICE O/P EST LOW 20 MIN: CPT | Performed by: NURSE PRACTITIONER

## 2020-11-27 ASSESSMENT — ENCOUNTER SYMPTOMS
CARDIOVASCULAR NEGATIVE: 1
PSYCHIATRIC NEGATIVE: 1
ALLERGIC/IMMUNOLOGIC NEGATIVE: 1
HEMATOLOGIC/LYMPHATIC NEGATIVE: 1
CONSTITUTIONAL NEGATIVE: 1
GASTROINTESTINAL NEGATIVE: 1
EYES NEGATIVE: 1
RESPIRATORY NEGATIVE: 1
ENDOCRINE NEGATIVE: 1
NEUROLOGICAL NEGATIVE: 1
MUSCULOSKELETAL NEGATIVE: 1

## 2020-11-27 NOTE — PATIENT INSTRUCTIONS
Please use debrox drops twice a day in both ears x 1 week  Make an appointment with ENT doctor for evaluation

## 2020-11-27 NOTE — TELEPHONE ENCOUNTER
4 days ago used an ear wax removal kit and after 2 days he can't hear anything as of now. His Ears both ache. No fever, no headache.   I scheduled pt for today to come over, he'll be here @ 9:45.

## 2020-11-27 NOTE — PROGRESS NOTES
[facility]  Adult Medicine in [place]        CHIEF COMPLAINT   Ear pain     HISTORY OF PRESENT ILLNESS      This is a 75 y.o. male with a past medical history of cerumen impaction who presents with ear pain, decreased hearing.  His symptoms started 5 days ago.  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  He used otc drops for 3 days but had no improvement in symptoms.  He has decreased hearing in both ears and feels like his L ear is under water, also feels mildly congested.  He denies fevers/chills, cough, further URI symptoms.    PAST MEDICAL AND SURGICAL HISTORY      PMHx:  Past Medical History:   Diagnosis Date   • Anxiety 4/22/2018    Patient was moderately anxious on arrival, seems calmer now, denies having a chronic problem   • Atrial flutter (CMS/HCC) 4/22/2018    Patient thinks he may have had palpitations off-and-on in his lifetime but they only ever lasted a few seconds and never required him seeking medical advice.     • Benign prostatic hyperplasia without lower urinary tract symptoms 5/4/2018   • Cardiomyopathy (CMS/HCC) (HCC) 4/24/2018   • History of pneumonia 5/4/2018   • Hx of nonmelanoma skin cancer 5/4/2018   • Macrocytic anemia 4/26/2018   • Skin cancer        PSHx:  Past Surgical History:   Procedure Laterality Date   • BASAL CELL CARCINOMA EXCISION         MEDICATIONS      Current Outpatient Medications on File Prior to Visit   Medication Sig Dispense Refill   • apixaban (ELIQUIS) 5 mg tablet Take 5 mg by mouth 2 (two) times a day.     • metoprolol succinate XL (TOPROL-XL) 50 mg 24 hr tablet Take 75 mg by mouth 2 (two) times a day.       No current facility-administered medications on file prior to visit.        Home medications were personally reviewed.    ALLERGIES      Patient has no known allergies.    FAMILY HISTORY      Family History   Problem Relation Age of Onset   • Atrial fibrillation Biological Sister             REVIEW OF SYSTEMS       Review of Systems   Constitutional: Negative.    HENT: Positive for congestion.         Decreased hearing    Eyes: Negative.    Respiratory: Negative.    Cardiovascular: Negative.    Gastrointestinal: Negative.    Endocrine: Negative.    Genitourinary: Negative.    Musculoskeletal: Negative.    Skin: Negative.    Allergic/Immunologic: Negative.    Neurological: Negative.    Hematological: Negative.    Psychiatric/Behavioral: Negative.        PHYSICAL EXAMINATION      Vitals:    11/27/20 0954   BP: 120/60   Pulse: 88   Resp: 16   Temp: 36.4 °C (97.6 °F)   SpO2: 98%       Body mass index is 22.76 kg/m².    Physical Exam  Constitutional:       Appearance: He is well-developed.   HENT:      Head: Normocephalic and atraumatic.      Right Ear: There is impacted cerumen.      Left Ear: There is impacted cerumen.   Eyes:      Conjunctiva/sclera: Conjunctivae normal.      Pupils: Pupils are equal, round, and reactive to light.   Neck:      Musculoskeletal: Normal range of motion and neck supple.   Cardiovascular:      Rate and Rhythm: Normal rate and regular rhythm.   Pulmonary:      Effort: Pulmonary effort is normal.      Breath sounds: Normal breath sounds.   Musculoskeletal: Normal range of motion.   Skin:     General: Skin is warm and dry.      Capillary Refill: Capillary refill takes less than 2 seconds.   Neurological:      Mental Status: He is alert and oriented to person, place, and time.   Psychiatric:         Behavior: Behavior normal.         LABS / IMAGING / STUDIES        Labs  No new labs.    Imaging  Not applicable      HEALTHCARE MAINTENANCE   Health Maintenance Due   Topic Date Due   • Varicella Vaccines (1 of 2 - 2-dose childhood series) 02/03/1946   • Medicare Annual Wellness Visit  02/03/1962   • Zoster Vaccine (1 of 2) 02/03/1994   • Pneumococcal (65 years and older) (2 of 2 - PPSV23) 02/27/2019   • Annual Falls Risk Screening  01/01/2020   • Influenza Vaccine (1) 08/01/2020          ASSESSMENT AND  PLAN           Problem List Items Addressed This Visit        Nervous    Bilateral impacted cerumen - Primary     --ear lavage completed with some success but still with cerumen  --debrox drops BID  --make appointment for f/u with ENT  --call with persistent/worsening symptoms          Relevant Orders    Ambulatory referral to ENT    Ear cerumen removal               ATTENDING DOCUMENTATION  ALSO SEE ATTENDING ATTESTATION SECTION OF NOTE

## 2020-11-27 NOTE — ASSESSMENT & PLAN NOTE
--ear lavage completed with some success but still with cerumen  --debrox drops BID  --make appointment for f/u with ENT  --call with persistent/worsening symptoms

## 2020-11-27 NOTE — PROCEDURES
Ear cerumen removal    Date/Time: 11/27/2020 11:29 AM  Performed by: Mina Perez CRNP  Authorized by: Mina Perez CRNP     Consent:     Consent obtained:  Verbal    Consent given by:  Patient    Risks discussed:  Bleeding, infection, incomplete removal, dizziness, pain and TM perforation    Alternatives discussed:  No treatment, delayed treatment, observation and referral  Procedure details:     Location:  R ear and L ear    Procedure type: irrigation    Post-procedure details:     Hearing quality:  Improved    Patient tolerance of procedure:  Tolerated with difficulty  Comments:      Able to remove some cerumen with gentle irrigation but 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

## 2021-01-27 ENCOUNTER — IMMUNIZATIONS (OUTPATIENT)
Dept: FAMILY MEDICINE CLINIC | Facility: HOSPITAL | Age: 76
End: 2021-01-27

## 2021-01-27 DIAGNOSIS — Z23 ENCOUNTER FOR IMMUNIZATION: Primary | ICD-10-CM

## 2021-01-27 PROCEDURE — 0001A SARS-COV-2 / COVID-19 MRNA VACCINE (PFIZER-BIONTECH) 30 MCG: CPT

## 2021-01-27 PROCEDURE — 91300 SARS-COV-2 / COVID-19 MRNA VACCINE (PFIZER-BIONTECH) 30 MCG: CPT

## 2021-02-17 ENCOUNTER — IMMUNIZATIONS (OUTPATIENT)
Dept: FAMILY MEDICINE CLINIC | Facility: HOSPITAL | Age: 76
End: 2021-02-17

## 2021-02-17 DIAGNOSIS — Z23 ENCOUNTER FOR IMMUNIZATION: Primary | ICD-10-CM

## 2021-02-17 PROCEDURE — 0002A SARS-COV-2 / COVID-19 MRNA VACCINE (PFIZER-BIONTECH) 30 MCG: CPT

## 2021-02-17 PROCEDURE — 91300 SARS-COV-2 / COVID-19 MRNA VACCINE (PFIZER-BIONTECH) 30 MCG: CPT

## 2021-10-19 ENCOUNTER — OFFICE VISIT (OUTPATIENT)
Dept: CARDIOLOGY | Facility: CLINIC | Age: 76
End: 2021-10-19
Payer: MEDICARE

## 2021-10-19 VITALS
WEIGHT: 165 LBS | SYSTOLIC BLOOD PRESSURE: 122 MMHG | BODY MASS INDEX: 23.1 KG/M2 | DIASTOLIC BLOOD PRESSURE: 70 MMHG | OXYGEN SATURATION: 99 % | HEIGHT: 71 IN | HEART RATE: 88 BPM

## 2021-10-19 DIAGNOSIS — R06.09 DOE (DYSPNEA ON EXERTION): ICD-10-CM

## 2021-10-19 DIAGNOSIS — I48.11 LONGSTANDING PERSISTENT ATRIAL FIBRILLATION (CMS/HCC): ICD-10-CM

## 2021-10-19 DIAGNOSIS — I48.92 ATRIAL FLUTTER, UNSPECIFIED TYPE (CMS/HCC): Primary | ICD-10-CM

## 2021-10-19 DIAGNOSIS — I42.9 CARDIOMYOPATHY, UNSPECIFIED TYPE (CMS/HCC): ICD-10-CM

## 2021-10-19 PROCEDURE — 99214 OFFICE O/P EST MOD 30 MIN: CPT | Performed by: INTERNAL MEDICINE

## 2021-10-19 PROCEDURE — 93000 ELECTROCARDIOGRAM COMPLETE: CPT | Performed by: INTERNAL MEDICINE

## 2021-10-19 RX ORDER — METOPROLOL TARTRATE 25 MG/1
25 TABLET, FILM COATED ORAL 2 TIMES DAILY
COMMUNITY
Start: 2021-09-01 | End: 2022-03-07

## 2021-10-19 NOTE — PROGRESS NOTES
"   [facility]  [place] Office       Patient ID: Alex Gonzalez 76 y.o. male 1945  PCP: Jeffery Kimble MD      HPI     Alex Gonzalez is a 76 y.o. male I had the pleasure of seeing today for cardiovascular and arrhythmia follow-up.    He has a past medical she significant paroxysmal, now persistent atrial flutter and fibrillation, right atrial enlargement and mild LV dysfunction in the setting of tachycardia.  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  Ablations been considered but he has done well with rate control and wants to reconsider that in the future.    He was last seen in June the had complaints of some dyspnea exertion which may been worse in similar extremity edema and had recent start on Lasix.  I recommended a cardiac PET stress.  This was done June 30 and was negative for ischemia and scar with moderately reduced global blood flow reserve 1.5.    He tells me today he is doing generally quite well.  He took Lasix just for a week or 2 and the edema resolved and has not returned.  He feels like he may have some palpitations on occasion but nothing sustained or bothersome.  Has not had any chest pain, shortness of breath, or major bleeding problems on Eliquis.           Medications     Current Outpatient Medications   Medication Instructions   • apixaban (ELIQUIS) 5 mg, oral, 2 times daily   • metoprolol tartrate (LOPRESSOR) 25 mg, 2 times daily         Allergies     Patient has no known allergies.     Vital Signs/Exam     Vitals:    10/19/21 0908   BP: 122/70   BP Location: Left upper arm   Patient Position: Sitting   Pulse: 88   SpO2: 99%   Weight: 74.8 kg (165 lb)   Height: 1.803 m (5' 11\")     BP Readings from Last 3 Encounters:   10/19/21 122/70   11/27/20 120/60   06/04/18 98/62     Wt Readings from Last 3 Encounters:   10/19/21 74.8 kg (165 lb)   11/27/20 74 kg (163 lb 3.2 oz)   06/04/18 74.1 kg (163 lb 6.4 oz) "        Gen: no distress  HEENT: no scleral icterus  Neck: no JVD, no carotid bruit  Lungs: clear bilaterally; no crackles, rhonchi, wheezing  Heart/Chest: regular rate and irregular rhythm; no murmur  Extremities: no edema  Neuro: nonfocal, alert and oriented  Psych: normal mood and affect     Diagnostic Data   Diagnostic data personally reviewed. Available medical records were reviewed and updated where appropriate including laboratory data, imaging studies, and previous outpatient and inpatient records.  Counseling/education performed.      Labs:  Lab Results   Component Value Date    WBC 6.24 06/04/2018    HGB 13.9 06/04/2018     06/04/2018    ALT 34 06/04/2018    AST 31 06/04/2018     06/04/2018    K 5.1 06/04/2018     06/04/2018    CREATININE 1.2 06/04/2018    BUN 20 06/04/2018    CO2 24 06/04/2018    TSH 3.90 04/23/2018    INR 1.2 04/26/2018       Lab Results   Component Value Date    CHOL 128 04/22/2018    LDLCALC 65 04/22/2018    HDL 55 04/22/2018    TRIG 38 04/22/2018       Echocardiogram: 12/10/20  Normal V systolic function normal regional wall motion, EF 55 to 60%  Dilated right atrium  Trace and I, mild MR, mild TR with estimated PASP of 31    Stress Tests: 6/30/21   Cardiac PET perfusion negative for ischemia and scar  Normal LV wall motion EF in size  EF reserve of -1  Moderate reduced global blood flow reserve 1.50          Cardiac cath: June 30, 2021  NA    Cardiac MRI 2018  Mildly dilated left atrial size with moderately dilated right atrial size  Normal LV cavity size with low normal systolic function  Left ventricular EF 53%  Focal mild hypokinesis of the mid apical inferolateral wall  Normal RV cavity size with preserved systolic function, RV EF 62%  No evidence of late gadolinium enhancement consistent with absence of fibrosis or scar  Trivial AI, mild MR, trace PI, mild TR  No significant pericardial effusion    Vascular Studies:          Cardiac Monitor:     ECG  independently reviewed: AF 87, PVC   Assessment and Plan        Alex Gonzalez is a 76 y.o. male who presents today for office follow-up.    1. Atrial flutter  2. Longstanding persistent atrial fibrillation  Currently in AF with normal rate and no major symptoms.  We will continue rate control with metoprolol and anticoagulation with Eliquis 5 mg twice daily.  We plan 6-month office follow-up but I had be happy to see him sooner if needed.      3. 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  December 2020 echo EF 55 to 60%.  Continue rate control of AF and reassess echocardiogram at next follow-up visit in 6 months.    4. Dyspnea- chronic mild and generally stable  No evidence of ischemia on recent PET perfusion stress test (6/2021) and no clinical CHF at present.  He required Lasix for mild edema in the summer 2021 but this resolved after few weeks and has not recurred.  Continue to monitor volume status.    Jeffery Pérez MD, FACC, RS  Cardiology and Cardiac Electrophysiology  [facility] Office: [phone]  Primary [facility] [place]: [facility]   10/19/2021

## 2021-12-20 RX ORDER — APIXABAN 5 MG/1
TABLET, FILM COATED ORAL
Qty: 60 TABLET | Refills: 5 | Status: SHIPPED
Start: 2021-12-20 | End: 2022-07-20

## 2022-03-07 RX ORDER — METOPROLOL TARTRATE 25 MG/1
TABLET, FILM COATED ORAL
Qty: 180 TABLET | Refills: 5 | Status: SHIPPED | OUTPATIENT
Start: 2022-03-07 | End: 2022-04-19 | Stop reason: ALTCHOICE

## 2022-04-19 ENCOUNTER — HOSPITAL ENCOUNTER (OUTPATIENT)
Dept: CARDIOLOGY | Facility: CLINIC | Age: 77
Discharge: HOME | End: 2022-04-19
Payer: MEDICARE

## 2022-04-19 ENCOUNTER — OFFICE VISIT (OUTPATIENT)
Dept: CARDIOLOGY | Facility: CLINIC | Age: 77
End: 2022-04-19
Payer: MEDICARE

## 2022-04-19 VITALS
BODY MASS INDEX: 23.1 KG/M2 | WEIGHT: 165 LBS | HEIGHT: 71 IN | SYSTOLIC BLOOD PRESSURE: 122 MMHG | DIASTOLIC BLOOD PRESSURE: 70 MMHG

## 2022-04-19 VITALS
HEIGHT: 71 IN | BODY MASS INDEX: 23.24 KG/M2 | HEART RATE: 85 BPM | WEIGHT: 166 LBS | OXYGEN SATURATION: 100 % | DIASTOLIC BLOOD PRESSURE: 70 MMHG | SYSTOLIC BLOOD PRESSURE: 120 MMHG

## 2022-04-19 DIAGNOSIS — I42.9 CARDIOMYOPATHY, UNSPECIFIED TYPE (CMS/HCC): ICD-10-CM

## 2022-04-19 DIAGNOSIS — I48.92 ATRIAL FLUTTER, UNSPECIFIED TYPE (CMS/HCC): ICD-10-CM

## 2022-04-19 DIAGNOSIS — I48.11 LONGSTANDING PERSISTENT ATRIAL FIBRILLATION (CMS/HCC): ICD-10-CM

## 2022-04-19 DIAGNOSIS — I48.11 LONGSTANDING PERSISTENT ATRIAL FIBRILLATION (CMS/HCC): Primary | ICD-10-CM

## 2022-04-19 LAB
AORTIC ROOT ANNULUS: 2.8 CM
ASCENDING AORTA: 3.1 CM
AV PEAK GRADIENT: 3 MMHG
AV PEAK VELOCITY-S: 0.93 M/S
AV VALVE AREA: 2.37 CM2
BSA FOR ECHO PROCEDURE: 1.94 M2
E WAVE DECELERATION TIME: 215 MS
E/A RATIO: 2.3
E/E' RATIO: 11.9
E/LAT E' RATIO: 7.7
EDV (BP): 98.3 CM3
EF (A4C): 51.2 %
EF A2C: 44.8 %
EJECTION FRACTION: 48.7 %
EST RIGHT VENT SYSTOLIC PRESSURE BY TRICUSPID REGURGITATION JET: 46 MMHG
ESV (BP): 50.4 CM3
FRACTIONAL SHORTENING: 21.1 %
INTERVENTRICULAR SEPTUM: 0.88 CM
LA ESV INDEX (A2C): 38.4 CM3/M2
LA/AORTA RATIO: 1.36
LAAS-AP2: 23.8 CM2
LAAS-AP4: 16.9 CM2
LAD 2D: 3.8 CM
LALD A4C: 4.65 CM
LALD A4C: 6.15 CM
LAV-S: 74.5 CM3
LEFT ATRIUM VOLUME INDEX: 24.95 CM3/M2
LEFT ATRIUM VOLUME: 48.4 CM3
LEFT INTERNAL DIMENSION IN SYSTOLE: 3.85 CM (ref 2.76–4.17)
LEFT VENTRICLE DIASTOLIC VOLUME INDEX: 41.6 CM3/M2
LEFT VENTRICLE DIASTOLIC VOLUME: 80.7 CM3
LEFT VENTRICLE SYSTOLIC VOLUME INDEX: 20.31 CM3/M2
LEFT VENTRICLE SYSTOLIC VOLUME: 39.4 CM3
LEFT VENTRICULAR INTERNAL DIMENSION IN DIASTOLE: 4.88 CM (ref 4.68–6.49)
LEFT VENTRICULAR POSTERIOR WALL IN END DIASTOLE: 0.83 CM (ref 0.61–1.14)
LV DIASTOLIC VOLUME: 111 CM3
LV ESV (APICAL 2 CHAMBER): 61.3 CM3
LVAD-AP2: 30.9 CM2
LVAD-AP4: 25 CM2
LVAS-AP2: 21.4 CM2
LVAS-AP4: 17.1 CM2
LVEDVI(A2C): 57.22 CM3/M2
LVEDVI(BP): 50.67 CM3/M2
LVESVI(A2C): 31.6 CM3/M2
LVESVI(BP): 25.98 CM3/M2
LVLD-AP2: 7.2 CM
LVLD-AP4: 6.59 CM
LVLS-AP2: 6.45 CM
LVLS-AP4: 6.05 CM
LVOT 2D: 2 CM
LVOT A: 3.14 CM2
LVOT PEAK VELOCITY: 0.7 M/S
MV E'TISSUE VEL-LAT: 0.12 M/S
MV E'TISSUE VEL-MED: 0.08 M/S
MV PEAK A VEL: 0.41 M/S
MV PEAK E VEL: 0.94 M/S
MV VALVE AREA P 1/2 METHOD: 3.49 CM2
POSTERIOR WALL: 0.83 CM
RAP: 15 MMHG
RVOT VMAX: 0.48 M/S
SEPTAL TISSUE DOPPLER FREE WALL LATE DIA VELOCITY (APICAL 4 CHAMBER VIEW): 0.1 M/S
TR MAX PG: 31 MMHG
TRICUSPID VALVE PEAK REGURGITATION VELOCITY: 2.8 M/S
Z-SCORE OF LEFT VENTRICULAR DIMENSION IN END DIASTOLE: -1.22
Z-SCORE OF LEFT VENTRICULAR DIMENSION IN END SYSTOLE: 1
Z-SCORE OF LEFT VENTRICULAR POSTERIOR WALL IN END DIASTOLE: -0.01

## 2022-04-19 PROCEDURE — 93000 ELECTROCARDIOGRAM COMPLETE: CPT | Performed by: INTERNAL MEDICINE

## 2022-04-19 PROCEDURE — 99214 OFFICE O/P EST MOD 30 MIN: CPT | Performed by: INTERNAL MEDICINE

## 2022-04-19 PROCEDURE — 93306 TTE W/DOPPLER COMPLETE: CPT | Performed by: STUDENT IN AN ORGANIZED HEALTH CARE EDUCATION/TRAINING PROGRAM

## 2022-04-19 RX ORDER — METOPROLOL SUCCINATE 50 MG/1
50 TABLET, EXTENDED RELEASE ORAL DAILY
Qty: 90 TABLET | Refills: 3 | Status: SHIPPED | OUTPATIENT
Start: 2022-04-19 | End: 2023-04-26

## 2022-04-19 NOTE — PROGRESS NOTES
"   [facility] Cardiology  [place] Office       Patient ID: Alex Gonzalez 77 y.o. male 1945  PCP: Jeffery Kimble MD      HPI     Alex Gonzalez is a 77 y.o. male who presents for cardiovascular and arrhythmia follow-up.    He has a past medical history significant for paroxysmal atrial flutter and now persistent atrial fibrillation, right atrial enlargement, and mild LV dysfunction initially in the setting of tachycardia.  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 He has declined ablation/rhythm control.    He presents for routine 6 month follow-up and had an echocardiogram this morning which shows LV function is low normal and there is biatrial enlargement with mild to moderate mitral and tricuspid regurgitation.  IVC is dilated with less than 50% collapse.  RVSP is 46.  He has been feeling generally well since his last visit.  He tells me he notices some awareness of his heart beating and blood flow but nothing that is limiting to his quality of life.  He denies exertional symptoms of shortness of breath or fatigue.  He has had occasional mild lower extremity edema and had taken Lasix for this previously but this time has no significant edema or shortness of breath and is no longer on diuretic therapy.  He remains on Eliquis for anticoagulation and has not had any major bleeding issues.  He denies chest pain or discomfort.       Medications     Current Outpatient Medications   Medication Instructions   • ELIQUIS 5 mg tablet TAKE 1 TABLET BY MOUTH TWICE A DAY   • metoprolol tartrate (LOPRESSOR) 25 mg tablet TAKE 1 TABLET TWICE A DAY         Allergies     Patient has no known allergies.     Vital Signs/Exam     Vitals:    04/19/22 0849   BP: 120/70   BP Location: Left upper arm   Patient Position: Sitting   Pulse: 85   SpO2: 100%   Weight: 75.3 kg (166 lb)   Height: 1.791 m (5' 10.5\")     BP " Readings from Last 3 Encounters:   04/19/22 120/70   04/19/22 122/70   10/19/21 122/70     Wt Readings from Last 3 Encounters:   04/19/22 75.3 kg (166 lb)   04/19/22 74.8 kg (165 lb)   10/19/21 74.8 kg (165 lb)        Gen: no distress  HEENT: no scleral icterus  Neck: no JVD, no carotid bruit  Lungs: clear bilaterally; no crackles, rhonchi, wheezing  Heart/Chest: irregularly irregular, no murmur  Extremities: no edema  Neuro: nonfocal, alert and oriented  Psych: normal mood and affect     Diagnostic Data   Diagnostic data personally reviewed. Available medical records were reviewed and updated where appropriate including laboratory data, imaging studies, and previous outpatient and inpatient records.  Counseling/education performed.      Labs:  Lab Results   Component Value Date    WBC 6.24 06/04/2018    HGB 13.9 06/04/2018     06/04/2018    ALT 34 06/04/2018    AST 31 06/04/2018     06/04/2018    K 5.1 06/04/2018     06/04/2018    CREATININE 1.2 06/04/2018    BUN 20 06/04/2018    CO2 24 06/04/2018    TSH 3.90 04/23/2018    INR 1.2 04/26/2018       Lab Results   Component Value Date    CHOL 128 04/22/2018    LDLCALC 65 04/22/2018    HDL 55 04/22/2018    TRIG 38 04/22/2018       Echocardiogram:   4/19/22   · Normal-sized LV. Low normal LV systolic function. Estimated EF 50- 55%. Wall motion appears grossly normal. Normal LV wall thickness. LV diastolic function: patient in a-fib.  · Normal-sized RV. Normal RV systolic function.  · Mildly dilated LA. Normal doppler flow of pulmonary veins.  · Moderately dilated RA.  · Tricuspid aortic valve. Sclerotic aortic valve leaflets. Mild aortic valve regurgitation with a centrally directed jet. No aortic valve stenosis.  · Mild bileaflet thickening of the mitral valve.  · Mild to moderate mitral valve regurgitation.  · Tricuspid valve structure is normal. Mild to moderate tricuspid valve regurgitation. The regurgitation jet is central.  · Estimated RVSP = 46  mmHg.  · Normal pulmonic valve structure. Mild pulmonic valve regurgitation. No pulmonic valve stenosis.  · IVC is dilated (>2.1cm). IVC collapses <50% during inspiration.  · No evidence of pericardial effusion.  · Compred to the prior study of 12/10/2020, the ejection fraction is now low normal. The degree of mitral and tricuspid regurgitation have worsened.      12/10/20  Normal V systolic function normal regional wall motion, EF 55 to 60%  Dilated right atrium  Trace and I, mild MR, mild TR with estimated PASP of 31    Stress Tests:   6/30/21   Cardiac PET perfusion negative for ischemia and scar  Normal LV wall motion EF in size  EF reserve of -1  Moderate reduced global blood flow reserve 1.50          Cardiac cath:   NA    Cardiac MRI 2018  Mildly dilated left atrial size with moderately dilated right atrial size  Normal LV cavity size with low normal systolic function  Left ventricular EF 53%  Focal mild hypokinesis of the mid apical inferolateral wall  Normal RV cavity size with preserved systolic function, RV EF 62%  No evidence of late gadolinium enhancement consistent with absence of fibrosis or scar  Trivial AI, mild MR, trace PI, mild TR  No significant pericardial effusion    Vascular Studies:          Cardiac Monitor:     ECG independently reviewed: 4/19/2022 AF 83 bpm   Assessment and Plan        Alex Gonzalez is a 77 y.o. male who presents today for office follow-up.     Diagnosis Plan   1. Longstanding persistent atrial fibrillation (CMS/HCC)  ECG 12 LEAD-OFFICE PERFORMED   2. Atrial flutter, unspecified type (CMS/HCC)     3. Cardiomyopathy, unspecified type (CMS/HCC)         Persistent atrial fibrillation  Initially presented with right atrial enlargement and atrial flutter with tachycardia mediated LV dysfunction.  Cardiac MRI as below.  He declined ablation/rhythm control.  Currently in AF with normal rate and no major symptoms.  Given mild LV dysfunction we will change Lopressor to Toprol XL 50  mg daily for continued rate control and he will continue Eliquis for stroke prevention.    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    December 2020 echo EF 55 to 60%.  Echo 4/19/2022 EF 50 to 55%, ABIDA, mild to moderate MR and TR      Dyspnea- chronic mild and generally stable  No evidence of ischemia on recent PET perfusion stress test (6/2021) and no clinical CHF at present.  He required Lasix for mild edema in the summer 2021 but this resolved after few weeks and has not recurred.  Continue to monitor volume status and the need for furosemide which we discussed today in detail..    We will plan to see him again in 6 months time but would be happy to see him sooner should the need arise.    I, Palmira Hirsch PA-C, attest that this documentation has been prepared under the direction and in the presence of Jeffery Pérez MD.    I have personally seen and examined the patient and independently reviewed pertinent data. I am responsible for the assessment and medical decision making. I reviewed and updated the findings and plan of care as documented on my behalf above.        Jeffery Pérez MD, FACC, FHRS  Cardiology and Cardiac Electrophysiology  [facility] Office: [phone]  Primary [facility] [place]: [facility]   4/19/2022

## 2022-04-19 NOTE — LETTER
April 19, 2022     Jeffery Kimble MD  [address]  [address]  [place] [address]    Patient: Alex Gonzalez  YOB: 1945  Date of Visit: 4/19/2022      Dear Dr. Kimble:    Thank you for referring Alex Gonzalez to me for evaluation. Below are my notes for this consultation.    If you have questions, please do not hesitate to call me. I look forward to following your patient along with you.         Sincerely,        Jeffery Pérez MD        CC: No Recipients  Jeffery Pérez MD  4/19/2022  9:22 AM  Signed     [facility] Cardiology  [place] Office       Patient ID: Alex Gonzalez 77 y.o. male 1945  PCP: Jeffery Kimble MD      HPI     Alex Gonzalez is a 77 y.o. male who presents for cardiovascular and arrhythmia follow-up.    He has a past medical history significant for paroxysmal atrial flutter and now persistent atrial fibrillation, right atrial enlargement, and mild LV dysfunction initially in the setting of tachycardia.  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 He has declined ablation/rhythm control.    He presents for routine 6 month follow-up and had an echocardiogram this morning which shows LV function is low normal and there is biatrial enlargement with mild to moderate mitral and tricuspid regurgitation.  IVC is dilated with less than 50% collapse.  RVSP is 46.  He has been feeling generally well since his last visit.  He tells me he notices some awareness of his heart beating and blood flow but nothing that is limiting to his quality of life.  He denies exertional symptoms of shortness of breath or fatigue.  He has had occasional mild lower extremity edema and had taken Lasix for this previously but this time has no significant edema or shortness of breath and is no longer on diuretic therapy.  He remains on Eliquis for anticoagulation and has not had any major  "bleeding issues.  He denies chest pain or discomfort.       Medications     Current Outpatient Medications   Medication Instructions   • ELIQUIS 5 mg tablet TAKE 1 TABLET BY MOUTH TWICE A DAY   • metoprolol tartrate (LOPRESSOR) 25 mg tablet TAKE 1 TABLET TWICE A DAY         Allergies     Patient has no known allergies.     Vital Signs/Exam     Vitals:    04/19/22 0849   BP: 120/70   BP Location: Left upper arm   Patient Position: Sitting   Pulse: 85   SpO2: 100%   Weight: 75.3 kg (166 lb)   Height: 1.791 m (5' 10.5\")     BP Readings from Last 3 Encounters:   04/19/22 120/70   04/19/22 122/70   10/19/21 122/70     Wt Readings from Last 3 Encounters:   04/19/22 75.3 kg (166 lb)   04/19/22 74.8 kg (165 lb)   10/19/21 74.8 kg (165 lb)        Gen: no distress  HEENT: no scleral icterus  Neck: no JVD, no carotid bruit  Lungs: clear bilaterally; no crackles, rhonchi, wheezing  Heart/Chest: irregularly irregular, no murmur  Extremities: no edema  Neuro: nonfocal, alert and oriented  Psych: normal mood and affect     Diagnostic Data   Diagnostic data personally reviewed. Available medical records were reviewed and updated where appropriate including laboratory data, imaging studies, and previous outpatient and inpatient records.  Counseling/education performed.      Labs:  Lab Results   Component Value Date    WBC 6.24 06/04/2018    HGB 13.9 06/04/2018     06/04/2018    ALT 34 06/04/2018    AST 31 06/04/2018     06/04/2018    K 5.1 06/04/2018     06/04/2018    CREATININE 1.2 06/04/2018    BUN 20 06/04/2018    CO2 24 06/04/2018    TSH 3.90 04/23/2018    INR 1.2 04/26/2018       Lab Results   Component Value Date    CHOL 128 04/22/2018    LDLCALC 65 04/22/2018    HDL 55 04/22/2018    TRIG 38 04/22/2018       Echocardiogram:   4/19/22   · Normal-sized LV. Low normal LV systolic function. Estimated EF 50- 55%. Wall motion appears grossly normal. Normal LV wall thickness. LV diastolic function: patient in " a-fib.  · Normal-sized RV. Normal RV systolic function.  · Mildly dilated LA. Normal doppler flow of pulmonary veins.  · Moderately dilated RA.  · Tricuspid aortic valve. Sclerotic aortic valve leaflets. Mild aortic valve regurgitation with a centrally directed jet. No aortic valve stenosis.  · Mild bileaflet thickening of the mitral valve.  · Mild to moderate mitral valve regurgitation.  · Tricuspid valve structure is normal. Mild to moderate tricuspid valve regurgitation. The regurgitation jet is central.  · Estimated RVSP = 46 mmHg.  · Normal pulmonic valve structure. Mild pulmonic valve regurgitation. No pulmonic valve stenosis.  · IVC is dilated (>2.1cm). IVC collapses <50% during inspiration.  · No evidence of pericardial effusion.  · Compred to the prior study of 12/10/2020, the ejection fraction is now low normal. The degree of mitral and tricuspid regurgitation have worsened.      12/10/20  Normal V systolic function normal regional wall motion, EF 55 to 60%  Dilated right atrium  Trace and I, mild MR, mild TR with estimated PASP of 31    Stress Tests:   6/30/21   Cardiac PET perfusion negative for ischemia and scar  Normal LV wall motion EF in size  EF reserve of -1  Moderate reduced global blood flow reserve 1.50          Cardiac cath:   NA    Cardiac MRI 2018  Mildly dilated left atrial size with moderately dilated right atrial size  Normal LV cavity size with low normal systolic function  Left ventricular EF 53%  Focal mild hypokinesis of the mid apical inferolateral wall  Normal RV cavity size with preserved systolic function, RV EF 62%  No evidence of late gadolinium enhancement consistent with absence of fibrosis or scar  Trivial AI, mild MR, trace PI, mild TR  No significant pericardial effusion    Vascular Studies:          Cardiac Monitor:     ECG independently reviewed: 4/19/2022 AF 83 bpm   Assessment and Plan        Alex Gonzalez is a 77 y.o. male who presents today for office follow-up.      Diagnosis Plan   1. Longstanding persistent atrial fibrillation (CMS/HCC)  ECG 12 LEAD-OFFICE PERFORMED   2. Atrial flutter, unspecified type (CMS/HCC)     3. Cardiomyopathy, unspecified type (CMS/HCC)         Persistent atrial fibrillation  Initially presented with right atrial enlargement and atrial flutter with tachycardia mediated LV dysfunction.  Cardiac MRI as below.  He declined ablation/rhythm control.  Currently in AF with normal rate and no major symptoms.  Given mild LV dysfunction we will change Lopressor to Toprol XL 50 mg daily for continued rate control and he will continue Eliquis for stroke prevention.    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    December 2020 echo EF 55 to 60%.  Echo 4/19/2022 EF 50 to 55%, ABIDA, mild to moderate MR and TR      Dyspnea- chronic mild and generally stable  No evidence of ischemia on recent PET perfusion stress test (6/2021) and no clinical CHF at present.  He required Lasix for mild edema in the summer 2021 but this resolved after few weeks and has not recurred.  Continue to monitor volume status and the need for furosemide which we discussed today in detail..    We will plan to see him again in 6 months time but would be happy to see him sooner should the need arise.    I, Palmira Hirsch PA-C, attest that this documentation has been prepared under the direction and in the presence of Jeffery Pérez MD.    I have personally seen and examined the patient and independently reviewed pertinent data. I am responsible for the assessment and medical decision making. I reviewed and updated the findings and plan of care as documented on my behalf above.        Jeffery Pérez MD, FACC, RS  Cardiology and Cardiac Electrophysiology  [facility] Office: [phone]  Primary [facility] [place]: [facility]   4/19/2022

## 2022-07-20 RX ORDER — APIXABAN 5 MG/1
TABLET, FILM COATED ORAL
Qty: 180 TABLET | Refills: 3 | Status: SHIPPED
Start: 2022-07-20 | End: 2022-07-22

## 2022-07-22 NOTE — TELEPHONE ENCOUNTER
Medicine Refill Request    Last Office: 4/19/2022   Last Consult Visit: Visit date not found  Last Telemedicine Visit: Visit date not found    Next Appointment: Visit date not found      Current Outpatient Medications:   •  ELIQUIS 5 mg tablet, TAKE 1 TABLET BY MOUTH TWICE A DAY, Disp: 180 tablet, Rfl: 3  •  metoprolol succinate XL (TOPROL-XL) 50 mg 24 hr tablet, Take 1 tablet (50 mg total) by mouth daily., Disp: 90 tablet, Rfl: 3      BP Readings from Last 3 Encounters:   04/19/22 122/70   04/19/22 120/70   10/19/21 122/70       Recent Lab results:  Lab Results   Component Value Date    CHOL 128 04/22/2018   ,   Lab Results   Component Value Date    HDL 55 04/22/2018   ,   Lab Results   Component Value Date    LDLCALC 65 04/22/2018   ,   Lab Results   Component Value Date    TRIG 38 04/22/2018        Lab Results   Component Value Date    GLUCOSE 142 (H) 06/04/2018   , No results found for: HGBA1C      Lab Results   Component Value Date    CREATININE 1.2 06/04/2018       Lab Results   Component Value Date    TSH 3.90 04/23/2018

## 2022-07-24 RX ORDER — APIXABAN 5 MG/1
TABLET, FILM COATED ORAL
Qty: 60 TABLET | Refills: 6 | Status: SHIPPED
Start: 2022-07-24 | End: 2023-03-27

## 2022-10-17 NOTE — PROGRESS NOTES
"   [facility] Cardiology  [place] Office       Patient ID: Alex Gonzalez 77 y.o. male 1945  PCP: Jeffery Kimble MD      HPI     Alex Gonzalez is a 77 y.o. male who presents for cardiovascular and arrhythmia follow-up.    He has a past medical history significant for paroxysmal atrial flutter and now persistent atrial fibrillation, right atrial enlargement, and mild LV dysfunction initially in the setting of tachycardia.  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 He has declined ablation/rhythm control.    He presents for routine follow-up and was last seen here 4/19/2022. He has no major complaints. He rides his bike up to 3 miles at a time on mild hills. He has occasional palpitations hat are somewhat random.  He has no chest pain. He has not had any bleeding problems.    He does not use lasix at all and has noted he does not have any edema as he did previously.            Medications     Current Outpatient Medications   Medication Instructions    ELIQUIS 5 mg tablet TAKE 1 TABLET BY MOUTH TWICE A DAY    metoprolol succinate XL (TOPROL-XL) 50 mg, oral, Daily         Allergies     Patient has no known allergies.     Vital Signs/Exam     Vitals:    10/18/22 0922   BP: 120/84   BP Location: Left upper arm   Patient Position: Sitting   Pulse: 92   SpO2: 99%   Weight: 74.8 kg (165 lb)   Height: 1.803 m (5' 11\")     BP Readings from Last 3 Encounters:   10/18/22 120/84   04/19/22 122/70   04/19/22 120/70     Wt Readings from Last 3 Encounters:   10/18/22 74.8 kg (165 lb)   04/19/22 74.8 kg (165 lb)   04/19/22 75.3 kg (166 lb)        Gen: no distress  HEENT: no scleral icterus  Neck: no JVD, no carotid bruit  Lungs: clear bilaterally; no crackles, rhonchi, wheezing  Heart/Chest: irregularly irregular, no murmur  Extremities: tr edema  Neuro: nonfocal, alert and oriented  Psych: normal mood and " affect     Diagnostic Data   Diagnostic data personally reviewed. Available medical records were reviewed and updated where appropriate including laboratory data, imaging studies, and previous outpatient and inpatient records.  Counseling/education performed.      Labs:  Lab Results   Component Value Date    WBC 6.24 06/04/2018    HGB 13.9 06/04/2018     06/04/2018    ALT 34 06/04/2018    AST 31 06/04/2018     06/04/2018    K 5.1 06/04/2018     06/04/2018    CREATININE 1.2 06/04/2018    BUN 20 06/04/2018    CO2 24 06/04/2018    TSH 3.90 04/23/2018    INR 1.2 04/26/2018       Lab Results   Component Value Date    CHOL 128 04/22/2018    LDLCALC 65 04/22/2018    HDL 55 04/22/2018    TRIG 38 04/22/2018       Echocardiogram:   4/19/22   · Normal-sized LV. Low normal LV systolic function. Estimated EF 50- 55%. Wall motion appears grossly normal. Normal LV wall thickness. LV diastolic function: patient in a-fib.  · Normal-sized RV. Normal RV systolic function.  · Mildly dilated LA. Normal doppler flow of pulmonary veins.  · Moderately dilated RA.  · Tricuspid aortic valve. Sclerotic aortic valve leaflets. Mild aortic valve regurgitation with a centrally directed jet. No aortic valve stenosis.  · Mild bileaflet thickening of the mitral valve.  · Mild to moderate mitral valve regurgitation.  · Tricuspid valve structure is normal. Mild to moderate tricuspid valve regurgitation. The regurgitation jet is central.  · Estimated RVSP = 46 mmHg.  · Normal pulmonic valve structure. Mild pulmonic valve regurgitation. No pulmonic valve stenosis.  · IVC is dilated (>2.1cm). IVC collapses <50% during inspiration.  · No evidence of pericardial effusion.  · Compred to the prior study of 12/10/2020, the ejection fraction is now low normal. The degree of mitral and tricuspid regurgitation have worsened.      12/10/20  Normal V systolic function normal regional wall motion, EF 55 to 60%  Dilated right atrium  Trace and I,  mild MR, mild TR with estimated PASP of 31    Stress tests:   6/30/21   Cardiac PET perfusion negative for ischemia and scar  Normal LV wall motion EF in size  EF reserve of -1  Moderate reduced global blood flow reserve 1.50          Cardiac cath:     Cardiac MRI 2018  Mildly dilated left atrial size with moderately dilated right atrial size  Normal LV cavity size with low normal systolic function  Left ventricular EF 53%  Focal mild hypokinesis of the mid apical inferolateral wall  Normal RV cavity size with preserved systolic function, RV EF 62%  No evidence of late gadolinium enhancement consistent with absence of fibrosis or scar  Trivial AI, mild MR, trace PI, mild TR  No significant pericardial effusion    Vascular studies:          Cardiac monitor:     ECG independently reviewed: 10/18/2022 AF 98 [id number]  4/19/2022 AF 83 bpm   Assessment and Plan        Alex Gonzalez is a 77 y.o. male who presents today for office follow-up.     Diagnosis Plan   1. Longstanding persistent atrial fibrillation (CMS/HCC)  ECG 12 LEAD-OFFICE PERFORMED      2. Cardiomyopathy, unspecified type (CMS/HCC)            Persistent atrial fibrillation  Initially presented with right atrial enlargement and atrial flutter with tachycardia mediated LV dysfunction 2018.  He declined ablation/rhythm control.  Re[place]in in AF with normal rate and no major symptoms.  Continue Toprol XL 50 mg daily and Eliquis 5mg twice daily for stroke prevention.    Cardiomyopathy with recovered EF  Mild chronic HFpEF  MRI 2018 no late gadolinium enhancement, normal EF, moderate MED mild LAE.    Echo 4/19/2022 EF 50 to 55%, ABIDA, mild to moderate MR and TR    We plan to stay off diuretics for now but he was reminded to monitor volume status and call to consider resuming diuretics if any signs or symptoms of volume overload develop. We will check an echocardiogram at next visit in 6 months.       Jeffery Pérez MD, FACC, [facility]  Cardiology and Cardiac  Electrophysiology  [facility] Office: [phone]  Primary [facility] [place]: [facility]   10/18/2022

## 2022-10-18 ENCOUNTER — OFFICE VISIT (OUTPATIENT)
Dept: CARDIOLOGY | Facility: CLINIC | Age: 77
End: 2022-10-18
Payer: MEDICARE

## 2022-10-18 VITALS
SYSTOLIC BLOOD PRESSURE: 120 MMHG | HEART RATE: 92 BPM | OXYGEN SATURATION: 99 % | WEIGHT: 165 LBS | DIASTOLIC BLOOD PRESSURE: 84 MMHG | BODY MASS INDEX: 23.1 KG/M2 | HEIGHT: 71 IN

## 2022-10-18 DIAGNOSIS — I42.9 CARDIOMYOPATHY, UNSPECIFIED TYPE (CMS/HCC): ICD-10-CM

## 2022-10-18 DIAGNOSIS — I48.11 LONGSTANDING PERSISTENT ATRIAL FIBRILLATION (CMS/HCC): Primary | ICD-10-CM

## 2022-10-18 PROCEDURE — 93000 ELECTROCARDIOGRAM COMPLETE: CPT | Performed by: INTERNAL MEDICINE

## 2022-10-18 PROCEDURE — 99214 OFFICE O/P EST MOD 30 MIN: CPT | Performed by: INTERNAL MEDICINE

## 2022-10-18 NOTE — LETTER
October 18, 2022     Jeffery Kimble MD  [address]  [address]  [place] [address]    Patient: Alex Gonzalez  YOB: 1945  Date of Visit: 10/18/2022      Dear Dr. Kimble:    Thank you for referring Alex Gonzalez to me for evaluation. Below are my notes for this consultation.    If you have questions, please do not hesitate to call me. I look forward to following your patient along with you.         Sincerely,        Jeffery Pérez MD        CC: No Recipients  Jeffery Pérez MD  10/18/2022  9:56 AM  Signed     [facility] Cardiology  [place] Office       Patient ID: Alex Gonzalez 77 y.o. male 1945  PCP: Jeffery Kimble MD      HPI     Alex Gonzalez is a 77 y.o. male who presents for cardiovascular and arrhythmia follow-up.    He has a past medical history significant for paroxysmal atrial flutter and now persistent atrial fibrillation, right atrial enlargement, and mild LV dysfunction initially in the setting of tachycardia.  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 He has declined ablation/rhythm control.    He presents for routine follow-up and was last seen here 4/19/2022. He has no major complaints. He rides his bike up to 3 miles at a time on mild hills. He has occasional palpitations hat are somewhat random.  He has no chest pain. He has not had any bleeding problems.    He does not use lasix at all and has noted he does not have any edema as he did previously.            Medications     Current Outpatient Medications   Medication Instructions    ELIQUIS 5 mg tablet TAKE 1 TABLET BY MOUTH TWICE A DAY    metoprolol succinate XL (TOPROL-XL) 50 mg, oral, Daily         Allergies     Patient has no known allergies.     Vital Signs/Exam     Vitals:    10/18/22 0922   BP: 120/84   BP Location: Left upper arm   Patient Position: Sitting   Pulse: 92   SpO2: 99%   Weight: 74.8  "kg (165 lb)   Height: 1.803 m (5' 11\")     BP Readings from Last 3 Encounters:   10/18/22 120/84   04/19/22 122/70   04/19/22 120/70     Wt Readings from Last 3 Encounters:   10/18/22 74.8 kg (165 lb)   04/19/22 74.8 kg (165 lb)   04/19/22 75.3 kg (166 lb)        Gen: no distress  HEENT: no scleral icterus  Neck: no JVD, no carotid bruit  Lungs: clear bilaterally; no crackles, rhonchi, wheezing  Heart/Chest: irregularly irregular, no murmur  Extremities: tr edema  Neuro: nonfocal, alert and oriented  Psych: normal mood and affect     Diagnostic Data   Diagnostic data personally reviewed. Available medical records were reviewed and updated where appropriate including laboratory data, imaging studies, and previous outpatient and inpatient records.  Counseling/education performed.      Labs:  Lab Results   Component Value Date    WBC 6.24 06/04/2018    HGB 13.9 06/04/2018     06/04/2018    ALT 34 06/04/2018    AST 31 06/04/2018     06/04/2018    K 5.1 06/04/2018     06/04/2018    CREATININE 1.2 06/04/2018    BUN 20 06/04/2018    CO2 24 06/04/2018    TSH 3.90 04/23/2018    INR 1.2 04/26/2018       Lab Results   Component Value Date    CHOL 128 04/22/2018    LDLCALC 65 04/22/2018    HDL 55 04/22/2018    TRIG 38 04/22/2018       Echocardiogram:   4/19/22   · Normal-sized LV. Low normal LV systolic function. Estimated EF 50- 55%. Wall motion appears grossly normal. Normal LV wall thickness. LV diastolic function: patient in a-fib.  · Normal-sized RV. Normal RV systolic function.  · Mildly dilated LA. Normal doppler flow of pulmonary veins.  · Moderately dilated RA.  · Tricuspid aortic valve. Sclerotic aortic valve leaflets. Mild aortic valve regurgitation with a centrally directed jet. No aortic valve stenosis.  · Mild bileaflet thickening of the mitral valve.  · Mild to moderate mitral valve regurgitation.  · Tricuspid valve structure is normal. Mild to moderate tricuspid valve regurgitation. The " regurgitation jet is central.  · Estimated RVSP = 46 mmHg.  · Normal pulmonic valve structure. Mild pulmonic valve regurgitation. No pulmonic valve stenosis.  · IVC is dilated (>2.1cm). IVC collapses <50% during inspiration.  · No evidence of pericardial effusion.  · Compred to the prior study of 12/10/2020, the ejection fraction is now low normal. The degree of mitral and tricuspid regurgitation have worsened.      12/10/20  Normal V systolic function normal regional wall motion, EF 55 to 60%  Dilated right atrium  Trace and I, mild MR, mild TR with estimated PASP of 31    Stress tests:   6/30/21   Cardiac PET perfusion negative for ischemia and scar  Normal LV wall motion EF in size  EF reserve of -1  Moderate reduced global blood flow reserve 1.50          Cardiac cath:     Cardiac MRI 2018  Mildly dilated left atrial size with moderately dilated right atrial size  Normal LV cavity size with low normal systolic function  Left ventricular EF 53%  Focal mild hypokinesis of the mid apical inferolateral wall  Normal RV cavity size with preserved systolic function, RV EF 62%  No evidence of late gadolinium enhancement consistent with absence of fibrosis or scar  Trivial AI, mild MR, trace PI, mild TR  No significant pericardial effusion    Vascular studies:          Cardiac monitor:     ECG independently reviewed: 10/18/2022 AF 98 nssttwa  4/19/2022 AF 83 bpm   Assessment and Plan        Alex Gonzalez is a 77 y.o. male who presents today for office follow-up.     Diagnosis Plan   1. Longstanding persistent atrial fibrillation (CMS/HCC)  ECG 12 LEAD-OFFICE PERFORMED      2. Cardiomyopathy, unspecified type (CMS/HCC)            Persistent atrial fibrillation  Initially presented with right atrial enlargement and atrial flutter with tachycardia mediated LV dysfunction 2018.  He declined ablation/rhythm control.  Reamsin in AF with normal rate and no major symptoms.  Continue Toprol XL 50 mg daily and Eliquis 5mg twice  daily for stroke prevention.    Cardiomyopathy with recovered EF  Mild chronic HFpEF  MRI 2018 no late gadolinium enhancement, normal EF, moderate MED mild LAE.    Echo 4/19/2022 EF 50 to 55%, ABIDA, mild to moderate MR and TR    We plan to stay off diuretics for now but he was reminded to monitor volume status and call to consider resuming diuretics if any signs or symptoms of volume overload develop. We will check an echocardiogram at next visit in 6 months.       Jeffery Pérez MD, FACC, RS  Cardiology and Cardiac Electrophysiology  [facility] Office: [phone]  [facility] [place]: [facility]   10/18/2022      Mercedes Flap Text: The defect edges were debeveled with a #15 scalpel blade.  Given the location of the defect, shape of the defect and the proximity to free margins a Mercedes flap was deemed most appropriate.  Using a sterile surgical marker, an appropriate advancement flap was drawn incorporating the defect and placing the expected incisions within the relaxed skin tension lines where possible. The area thus outlined was incised deep to adipose tissue with a #15 scalpel blade.  The skin margins were undermined to an appropriate distance in all directions utilizing iris scissors.

## 2023-03-27 RX ORDER — APIXABAN 5 MG/1
TABLET, FILM COATED ORAL
Qty: 60 TABLET | Refills: 6 | Status: SHIPPED
Start: 2023-03-27 | End: 2023-03-29

## 2023-03-27 NOTE — TELEPHONE ENCOUNTER
Medicine Refill Request    Last Office: 10/18/2022   Last Consult Visit: Visit date not found  Last Telemedicine Visit: Visit date not found    Next Appointment: 5/2/2023      Current Outpatient Medications:   •  ELIQUIS 5 mg tablet, TAKE 1 TABLET BY MOUTH TWICE A DAY, Disp: 60 tablet, Rfl: 6  •  metoprolol succinate XL (TOPROL-XL) 50 mg 24 hr tablet, Take 1 tablet (50 mg total) by mouth daily., Disp: 90 tablet, Rfl: 3      BP Readings from Last 3 Encounters:   10/18/22 120/84   04/19/22 122/70   04/19/22 120/70       Recent Lab results:  Lab Results   Component Value Date    CHOL 128 04/22/2018   ,   Lab Results   Component Value Date    HDL 55 04/22/2018   ,   Lab Results   Component Value Date    LDLCALC 65 04/22/2018   ,   Lab Results   Component Value Date    TRIG 38 04/22/2018        Lab Results   Component Value Date    GLUCOSE 142 (H) 06/04/2018   , No results found for: HGBA1C      Lab Results   Component Value Date    CREATININE 1.2 06/04/2018       Lab Results   Component Value Date    TSH 3.90 04/23/2018

## 2023-03-29 RX ORDER — APIXABAN 5 MG/1
TABLET, FILM COATED ORAL
Qty: 60 TABLET | Refills: 6 | Status: SHIPPED
Start: 2023-03-29 | End: 2023-11-20

## 2023-03-29 NOTE — TELEPHONE ENCOUNTER
Medicine Refill Request    Last Office: 10/18/2022   Last Consult Visit: Visit date not found  Last Telemedicine Visit: Visit date not found    Next Appointment: 5/2/2023      Current Outpatient Medications:   •  ELIQUIS 5 mg tablet, TAKE ONE TABLET BY MOUTH TWICE A DAY, Disp: 60 tablet, Rfl: 6  •  metoprolol succinate XL (TOPROL-XL) 50 mg 24 hr tablet, Take 1 tablet (50 mg total) by mouth daily., Disp: 90 tablet, Rfl: 3      BP Readings from Last 3 Encounters:   10/18/22 120/84   04/19/22 122/70   04/19/22 120/70       Recent Lab results:  Lab Results   Component Value Date    CHOL 128 04/22/2018   ,   Lab Results   Component Value Date    HDL 55 04/22/2018   ,   Lab Results   Component Value Date    LDLCALC 65 04/22/2018   ,   Lab Results   Component Value Date    TRIG 38 04/22/2018        Lab Results   Component Value Date    GLUCOSE 142 (H) 06/04/2018   , No results found for: HGBA1C      Lab Results   Component Value Date    CREATININE 1.2 06/04/2018       Lab Results   Component Value Date    TSH 3.90 04/23/2018

## 2023-04-26 RX ORDER — METOPROLOL SUCCINATE 50 MG/1
TABLET, EXTENDED RELEASE ORAL
Qty: 90 TABLET | Refills: 3 | Status: SHIPPED | OUTPATIENT
Start: 2023-04-26 | End: 2024-04-09

## 2023-05-01 ENCOUNTER — TELEPHONE (OUTPATIENT)
Dept: CARDIOLOGY | Facility: CLINIC | Age: 78
End: 2023-05-01
Payer: MEDICARE

## 2023-05-01 NOTE — PROGRESS NOTES
"   [facility] Cardiology  [place] Office Visit       Patient ID: Alex Gonzalez 78 y.o. male 1945  PCP: Jeffery Kimble MD      HPI     Alex Gonzalez is a 78 y.o. male who presents for cardiovascular and arrhythmia follow-up.    He has a past medical history significant for paroxysmal atrial flutter and now persistent atrial fibrillation, right atrial enlargement, and mild LV dysfunction initially in the setting of tachycardia.  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 He has declined ablation/rhythm control.    He presents for routine follow-up and was last seen here 10/18/2022.    He is doing generally well.  He does feel that he is aging a little bit in terms of his aches and what not but he is not having any significant shortness of breath.  He has mild edema which is stable.  He prefers to not use Lasix because of frequent urination.  He has no major palpitations or chest pain.    He remains on Eliquis and metoprolol and has had no major bleeding problems..       Medications     Current Outpatient Medications   Medication Instructions   • ELIQUIS 5 mg tablet TAKE ONE TABLET BY MOUTH TWICE A DAY   • metoprolol succinate XL (TOPROL-XL) 50 mg 24 hr tablet TAKE 1 TABLET BY MOUTH EVERY DAY         Allergies     Patient has no known allergies.     Vital Signs/Exam     Vitals:    05/02/23 0923   BP: 120/80   BP Location: Right upper arm   Patient Position: Sitting   Pulse: 90   SpO2: 98%   Weight: 72.6 kg (160 lb)   Height: 1.803 m (5' 11\")     BP Readings from Last 3 Encounters:   05/02/23 120/80   05/02/23 120/80   10/18/22 120/84     Wt Readings from Last 3 Encounters:   05/02/23 72.6 kg (160 lb)   05/02/23 74.8 kg (165 lb)   10/18/22 74.8 kg (165 lb)        Gen: no distress  HEENT: no scleral icterus  Neck: no JVD, no carotid bruit  Lungs: clear bilaterally; no crackles, rhonchi, " wheezing  Heart/Chest: irregularly irregular, no murmur  Extremities: mild/tr edema  Neuro: nonfocal, alert and oriented  Psych: normal mood and affect     Diagnostic Data   Diagnostic data personally reviewed. Available medical records were reviewed and updated where appropriate including laboratory data, imaging studies, and previous outpatient and inpatient records.  Counseling/education performed.      Labs:  Lab Results   Component Value Date    WBC 6.24 06/04/2018    HGB 13.9 06/04/2018     06/04/2018    ALT 34 06/04/2018    AST 31 06/04/2018     06/04/2018    K 5.1 06/04/2018     06/04/2018    CREATININE 1.2 06/04/2018    BUN 20 06/04/2018    CO2 24 06/04/2018    TSH 3.90 04/23/2018    INR 1.2 04/26/2018       Lab Results   Component Value Date    CHOL 128 04/22/2018    LDLCALC 65 04/22/2018    HDL 55 04/22/2018    TRIG 38 04/22/2018       Echocardiogram:   5/2/2023 prelim STP    4/19/22   · Normal-sized LV. Low normal LV systolic function. Estimated EF 50- 55%. Wall motion appears grossly normal. Normal LV wall thickness. LV diastolic function: patient in a-fib.  · Normal-sized RV. Normal RV systolic function.  · Mildly dilated LA. Normal doppler flow of pulmonary veins.  · Moderately dilated RA.  · Tricuspid aortic valve. Sclerotic aortic valve leaflets. Mild aortic valve regurgitation with a centrally directed jet. No aortic valve stenosis.  · Mild bileaflet thickening of the mitral valve.  · Mild to moderate mitral valve regurgitation.  · Tricuspid valve structure is normal. Mild to moderate tricuspid valve regurgitation. The regurgitation jet is central.  · Estimated RVSP = 46 mmHg.  · Normal pulmonic valve structure. Mild pulmonic valve regurgitation. No pulmonic valve stenosis.  · IVC is dilated (>2.1cm). IVC collapses <50% during inspiration.  · No evidence of pericardial effusion.  · Compred to the prior study of 12/10/2020, the ejection fraction is now low normal. The degree of  mitral and tricuspid regurgitation have worsened.      12/10/20  Normal V systolic function normal regional wall motion, EF 55 to 60%  Dilated right atrium  Trace and I, mild MR, mild TR with estimated PASP of 31    Stress tests:   6/30/21   Cardiac PET perfusion negative for ischemia and scar  Normal LV wall motion EF in size  EF reserve of -1  Moderate reduced global blood flow reserve 1.50        Cardiac cath:     Cardiac MRI 2018  Mildly dilated left atrial size with moderately dilated right atrial size  Normal LV cavity size with low normal systolic function  Left ventricular EF 53%  Focal mild hypokinesis of the mid apical inferolateral wall  Normal RV cavity size with preserved systolic function, RV EF 62%  No evidence of late gadolinium enhancement consistent with absence of fibrosis or scar  Trivial AI, mild MR, trace PI, mild TR  No significant pericardial effusion    Vascular studies:      Cardiac monitor:     ECG independently reviewed:   5/2/2023  nssttwa  10/18/2022 AF 98 nssttwa  4/19/2022 AF 83 bpm   Assessment and Plan        Alex Gonzalez is a 78 y.o. male      Diagnosis Plan   1. Longstanding persistent atrial fibrillation (CMS/HCC)  ECG 12 LEAD-OFFICE PERFORMED      2. Cardiomyopathy, unspecified type (CMS/HCC)            Persistent atrial fibrillation  Initially presented with right atrial enlargement and atrial flutter with tachycardia mediated LV dysfunction 2018 and declined rhythm control.    Yolanda in in AF with normal rate and no major symptoms.  Continue Toprol XL 50 mg daily and Eliquis 5mg twice daily for stroke prevention.    Cardiomyopathy with recovered EF  Mild chronic HFpEF  MRI 2018 no late gadolinium enhancement, normal EF, moderate MED mild LAE.    Echo today preliminarily similar to prior.    He appears relatively euvolemic but does have mild chronic edema.  We again discussed diuretics and he prefers to stay off of them because of frequent urination which is reasonable but  I counseled him to resume Lasix for signs and symptoms of volume overload in the future and he of course can always contact us prior to next follow-up visit with any questions or concerns.    Jeffery Pérez MD, FACC, RS  Cardiology and Cardiac Electrophysiology  [facility] [place] Office: [phone]  [facility] [place]: [facility]   5/2/2023

## 2023-05-02 ENCOUNTER — OFFICE VISIT (OUTPATIENT)
Dept: CARDIOLOGY | Facility: CLINIC | Age: 78
End: 2023-05-02
Payer: MEDICARE

## 2023-05-02 ENCOUNTER — HOSPITAL ENCOUNTER (OUTPATIENT)
Dept: CARDIOLOGY | Facility: CLINIC | Age: 78
Discharge: HOME | End: 2023-05-02
Attending: INTERNAL MEDICINE
Payer: MEDICARE

## 2023-05-02 VITALS
HEIGHT: 71 IN | WEIGHT: 165 LBS | SYSTOLIC BLOOD PRESSURE: 120 MMHG | BODY MASS INDEX: 23.1 KG/M2 | DIASTOLIC BLOOD PRESSURE: 80 MMHG

## 2023-05-02 VITALS
DIASTOLIC BLOOD PRESSURE: 80 MMHG | HEIGHT: 71 IN | SYSTOLIC BLOOD PRESSURE: 120 MMHG | BODY MASS INDEX: 22.4 KG/M2 | WEIGHT: 160 LBS | OXYGEN SATURATION: 98 % | HEART RATE: 90 BPM

## 2023-05-02 DIAGNOSIS — I42.9 CARDIOMYOPATHY, UNSPECIFIED TYPE (CMS/HCC): ICD-10-CM

## 2023-05-02 DIAGNOSIS — I48.11 LONGSTANDING PERSISTENT ATRIAL FIBRILLATION (CMS/HCC): Primary | ICD-10-CM

## 2023-05-02 DIAGNOSIS — I48.11 LONGSTANDING PERSISTENT ATRIAL FIBRILLATION (CMS/HCC): ICD-10-CM

## 2023-05-02 LAB
AORTIC ROOT ANNULUS: 3.2 CM
ASCENDING AORTA: 2.9 CM
AV PEAK GRADIENT: 5 MMHG
AV PEAK VELOCITY-S: 1.17 M/S
AV REG PEAK VEL: 3.33 M/S
AV REGURGITATION PRESSURE HALF TIME: 505 MS
AV VALVE AREA: 2.19 CM2
BSA FOR ECHO PROCEDURE: 1.94 M2
DESCENDING AORTA: 2.5 CM
E WAVE DECELERATION TIME: 77 MS
E/A RATIO: 2.1
E/E' RATIO: 8.5
E/LAT E' RATIO: 9.1
EDV (BP): 68.9 CM3
EF (A4C): 49.5 %
EF A2C: 51.2 %
EJECTION FRACTION: 51.2 %
EST RIGHT VENT SYSTOLIC PRESSURE BY TRICUSPID REGURGITATION JET: 50 MMHG
ESV (BP): 33.6 CM3
FRACTIONAL SHORTENING: 26.3 %
INTERVENTRICULAR SEPTUM: 1.1 CM
LA ESV (BP): 62.3 CM3
LA ESV INDEX (A2C): 33.14 CM3/M2
LA ESV INDEX (BP): 32.11 CM3/M2
LA/AORTA RATIO: 1.09
LAAS-AP2: 22.7 CM2
LAAS-AP4: 20.6 CM2
LAD 2D: 3.5 CM
LALD A4C: 5.71 CM
LALD A4C: 5.88 CM
LAV-S: 64.3 CM3
LEFT ATRIUM VOLUME INDEX: 30.46 CM3/M2
LEFT ATRIUM VOLUME: 59.1 CM3
LEFT INTERNAL DIMENSION IN SYSTOLE: 2.83 CM (ref 2.76–4.17)
LEFT VENTRICLE DIASTOLIC VOLUME INDEX: 27.32 CM3/M2
LEFT VENTRICLE DIASTOLIC VOLUME: 53 CM3
LEFT VENTRICLE SYSTOLIC VOLUME INDEX: 13.81 CM3/M2
LEFT VENTRICLE SYSTOLIC VOLUME: 26.8 CM3
LEFT VENTRICULAR INTERNAL DIMENSION IN DIASTOLE: 3.84 CM (ref 4.68–6.49)
LEFT VENTRICULAR POSTERIOR WALL IN END DIASTOLE: 1.3 CM (ref 0.61–1.14)
LV DIASTOLIC VOLUME: 81.7 CM3
LV ESV (APICAL 2 CHAMBER): 39.9 CM3
LVAD-AP2: 25 CM2
LVAD-AP4: 19.7 CM2
LVAS-AP2: 17.5 CM2
LVAS-AP4: 14 CM2
LVEDVI(A2C): 42.11 CM3/M2
LVEDVI(BP): 35.52 CM3/M2
LVESVI(A2C): 20.57 CM3/M2
LVESVI(BP): 17.32 CM3/M2
LVLD-AP2: 6.71 CM
LVLD-AP4: 6.05 CM
LVLS-AP2: 6.7 CM
LVLS-AP4: 6.31 CM
LVOT 2D: 2 CM
LVOT A: 3.14 CM2
LVOT PEAK VELOCITY: 1.04 M/S
LVOT PG: 4 MMHG
MLH CV ECHO AVA INDEX VELOCITY RATIO: 1.1
MV E'TISSUE VEL-LAT: 0.13 M/S
MV E'TISSUE VEL-MED: 0.14 M/S
MV PEAK A VEL: 0.56 M/S
MV PEAK E VEL: 1.15 M/S
POSTERIOR WALL: 1.3 CM
RAP: 10 MMHG
RVOT VMAX: 0.59 M/S
SEPTAL TISSUE DOPPLER FREE WALL LATE DIA VELOCITY (APICAL 4 CHAMBER VIEW): 0.11 M/S
TR MAX PG: 40.2 MMHG
TRICUSPID VALVE PEAK REGURGITATION VELOCITY: 3.17 M/S
Z-SCORE OF LEFT VENTRICULAR DIMENSION IN END DIASTOLE: -3.61
Z-SCORE OF LEFT VENTRICULAR DIMENSION IN END SYSTOLE: -1.43
Z-SCORE OF LEFT VENTRICULAR POSTERIOR WALL IN END DIASTOLE: 2.32

## 2023-05-02 PROCEDURE — 93306 TTE W/DOPPLER COMPLETE: CPT | Performed by: STUDENT IN AN ORGANIZED HEALTH CARE EDUCATION/TRAINING PROGRAM

## 2023-05-02 PROCEDURE — 99214 OFFICE O/P EST MOD 30 MIN: CPT | Performed by: INTERNAL MEDICINE

## 2023-05-02 PROCEDURE — 93000 ELECTROCARDIOGRAM COMPLETE: CPT | Performed by: INTERNAL MEDICINE

## 2023-05-02 NOTE — LETTER
May 2, 2023     Jeffery Kimble MD  [address]  [address]  [place] [address]    Patient: Alex Gonzalez  YOB: 1945  Date of Visit: 5/2/2023      Dear Dr. Kimble:    Thank you for referring Alex Gonzalez to me for evaluation. Below are my notes for this consultation.    If you have questions, please do not hesitate to call me. I look forward to following your patient along with you.         Sincerely,        Jeffery Pérez MD        CC: No Recipients    Jefefry Pérez MD  5/2/2023 10:11 AM  Signed     [facility] Cardiology  [place] Office Visit       Patient ID: Alex Gonzalez 78 y.o. male 1945  PCP: Jeffery Kimble MD      HPI     Alex Gonzalez is a 78 y.o. male who presents for cardiovascular and arrhythmia follow-up.    He has a past medical history significant for paroxysmal atrial flutter and now persistent atrial fibrillation, right atrial enlargement, and mild LV dysfunction initially in the setting of tachycardia.  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 He has declined ablation/rhythm control.    He presents for routine follow-up and was last seen here 10/18/2022.    He is doing generally well.  He does feel that he is aging a little bit in terms of his aches and what not but he is not having any significant shortness of breath.  He has mild edema which is stable.  He prefers to not use Lasix because of frequent urination.  He has no major palpitations or chest pain.    He remains on Eliquis and metoprolol and has had no major bleeding problems..       Medications     Current Outpatient Medications   Medication Instructions   • ELIQUIS 5 mg tablet TAKE ONE TABLET BY MOUTH TWICE A DAY   • metoprolol succinate XL (TOPROL-XL) 50 mg 24 hr tablet TAKE 1 TABLET BY MOUTH EVERY DAY         Allergies     Patient has no known allergies.     Vital Signs/Exam     Vitals:     "05/02/23 0923   BP: 120/80   BP Location: Right upper arm   Patient Position: Sitting   Pulse: 90   SpO2: 98%   Weight: 72.6 kg (160 lb)   Height: 1.803 m (5' 11\")     BP Readings from Last 3 Encounters:   05/02/23 120/80   05/02/23 120/80   10/18/22 120/84     Wt Readings from Last 3 Encounters:   05/02/23 72.6 kg (160 lb)   05/02/23 74.8 kg (165 lb)   10/18/22 74.8 kg (165 lb)        Gen: no distress  HEENT: no scleral icterus  Neck: no JVD, no carotid bruit  Lungs: clear bilaterally; no crackles, rhonchi, wheezing  Heart/Chest: irregularly irregular, no murmur  Extremities: mild/tr edema  Neuro: nonfocal, alert and oriented  Psych: normal mood and affect     Diagnostic Data   Diagnostic data personally reviewed. Available medical records were reviewed and updated where appropriate including laboratory data, imaging studies, and previous outpatient and inpatient records.  Counseling/education performed.      Labs:  Lab Results   Component Value Date    WBC 6.24 06/04/2018    HGB 13.9 06/04/2018     06/04/2018    ALT 34 06/04/2018    AST 31 06/04/2018     06/04/2018    K 5.1 06/04/2018     06/04/2018    CREATININE 1.2 06/04/2018    BUN 20 06/04/2018    CO2 24 06/04/2018    TSH 3.90 04/23/2018    INR 1.2 04/26/2018       Lab Results   Component Value Date    CHOL 128 04/22/2018    LDLCALC 65 04/22/2018    HDL 55 04/22/2018    TRIG 38 04/22/2018       Echocardiogram:   5/2/2023 prelim STP    4/19/22   · Normal-sized LV. Low normal LV systolic function. Estimated EF 50- 55%. Wall motion appears grossly normal. Normal LV wall thickness. LV diastolic function: patient in a-fib.  · Normal-sized RV. Normal RV systolic function.  · Mildly dilated LA. Normal doppler flow of pulmonary veins.  · Moderately dilated RA.  · Tricuspid aortic valve. Sclerotic aortic valve leaflets. Mild aortic valve regurgitation with a centrally directed jet. No aortic valve stenosis.  · Mild bileaflet thickening of the mitral " valve.  · Mild to moderate mitral valve regurgitation.  · Tricuspid valve structure is normal. Mild to moderate tricuspid valve regurgitation. The regurgitation jet is central.  · Estimated RVSP = 46 mmHg.  · Normal pulmonic valve structure. Mild pulmonic valve regurgitation. No pulmonic valve stenosis.  · IVC is dilated (>2.1cm). IVC collapses <50% during inspiration.  · No evidence of pericardial effusion.  · Compred to the prior study of 12/10/2020, the ejection fraction is now low normal. The degree of mitral and tricuspid regurgitation have worsened.      12/10/20  Normal V systolic function normal regional wall motion, EF 55 to 60%  Dilated right atrium  Trace and I, mild MR, mild TR with estimated PASP of 31    Stress tests:   6/30/21   Cardiac PET perfusion negative for ischemia and scar  Normal LV wall motion EF in size  EF reserve of -1  Moderate reduced global blood flow reserve 1.50        Cardiac cath:     Cardiac MRI 2018  Mildly dilated left atrial size with moderately dilated right atrial size  Normal LV cavity size with low normal systolic function  Left ventricular EF 53%  Focal mild hypokinesis of the mid apical inferolateral wall  Normal RV cavity size with preserved systolic function, RV EF 62%  No evidence of late gadolinium enhancement consistent with absence of fibrosis or scar  Trivial AI, mild MR, trace PI, mild TR  No significant pericardial effusion    Vascular studies:      Cardiac monitor:     ECG independently reviewed:   5/2/2023  nssttwa  10/18/2022 AF 98 nssttwa  4/19/2022 AF 83 bpm   Assessment and Plan        Alex Gonzalez is a 78 y.o. male      Diagnosis Plan   1. Longstanding persistent atrial fibrillation (CMS/HCC)  ECG 12 LEAD-OFFICE PERFORMED      2. Cardiomyopathy, unspecified type (CMS/HCC)            Persistent atrial fibrillation  Initially presented with right atrial enlargement and atrial flutter with tachycardia mediated LV dysfunction 2018 and declined rhythm  control.    Yolanda in in AF with normal rate and no major symptoms.  Continue Toprol XL 50 mg daily and Eliquis 5mg twice daily for stroke prevention.    Cardiomyopathy with recovered EF  Mild chronic HFpEF  MRI 2018 no late gadolinium enhancement, normal EF, moderate MED mild LAE.    Echo today preliminarily similar to prior.    He appears relatively euvolemic but does have mild chronic edema.  We again discussed diuretics and he prefers to stay off of them because of frequent urination which is reasonable but I counseled him to resume Lasix for signs and symptoms of volume overload in the future and he of course can always contact us prior to next follow-up visit with any questions or concerns.    Jeffery Pérez MD, FACC, RS  Cardiology and Cardiac Electrophysiology  [facility] [place] Office: [phone]  [facility] [place]: [facility]   5/2/2023

## 2023-11-06 NOTE — PROGRESS NOTES
"   [facility] Cardiology  [place] Office Visit       Patient ID: Alex Gonzalez 78 y.o. male 1945  PCP: Jeffery Kimble MD      HPI     Alex Gonzalez is a 78 y.o. male who presents for cardiovascular and arrhythmia follow-up.    He has a past medical history significant for paroxysmal atrial flutter and now persistent atrial fibrillation, right atrial enlargement, and mild LV dysfunction initially in the setting of tachycardia.  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 He has declined ablation/rhythm control.    He presents for routine follow-up and was last seen here 5/2/2023.    He remains on Eliquis and metoprolol and has had no major bleeding problems. He has stable palpitations which he notices early in the morning and worse with stress. He denies sob and cp.      Medications     Current Outpatient Medications   Medication Instructions    ELIQUIS 5 mg tablet TAKE ONE TABLET BY MOUTH TWICE A DAY    metoprolol succinate XL (TOPROL-XL) 50 mg 24 hr tablet TAKE 1 TABLET BY MOUTH EVERY DAY         Allergies     Patient has no known allergies.     Vital Signs/Exam     Vitals:    11/07/23 1005   BP: 130/78   Pulse: 71   SpO2: 99%   Weight: 73.5 kg (162 lb)   Height: 1.803 m (5' 11\")     BP Readings from Last 3 Encounters:   11/07/23 130/78   05/02/23 120/80   05/02/23 120/80     Wt Readings from Last 3 Encounters:   11/07/23 73.5 kg (162 lb)   05/02/23 74.8 kg (165 lb)   05/02/23 72.6 kg (160 lb)        Gen: no distress  HEENT: no scleral icterus  Neck: no JVD, no carotid bruit  Lungs: clear bilaterally; no crackles, rhonchi, wheezing  Heart/Chest: irregularly irregular, no murmur  Extremities: mild/tr edema  Neuro: nonfocal, alert and oriented  Psych: normal mood and affect     Diagnostic Data   Diagnostic data personally reviewed. Available medical records were reviewed and updated where " appropriate including laboratory data, imaging studies, and previous outpatient and inpatient records.  Counseling/education performed.      Labs:  Lab Results   Component Value Date    WBC 6.24 06/04/2018    HGB 13.9 06/04/2018     06/04/2018    ALT 34 06/04/2018    AST 31 06/04/2018     06/04/2018    K 5.1 06/04/2018     06/04/2018    CREATININE 1.2 06/04/2018    BUN 20 06/04/2018    CO2 24 06/04/2018    TSH 3.90 04/23/2018    INR 1.2 04/26/2018       Lab Results   Component Value Date    CHOL 128 04/22/2018    LDLCALC 65 04/22/2018    HDL 55 04/22/2018    TRIG 38 04/22/2018       Echocardiogram:   5/2/2023    Left Ventricle: Normal ventricle size. Normal wall thickness. Low normal systolic function. Estimated EF 50-55%. Wall motion appears grossly normal. Diastolic function: patient in a-fib.    Right Ventricle: Normal ventricle size. Normal systolic function.    Left Atrium: Mildly dilated atrium.    Right Atrium: Moderately dilated atrium.    Aortic Valve: Tricuspid valve.  Sclerotic leaflets. Mild regurgitation. No stenosis.    Mitral Valve: Normal leaflet structure. Normal leaflet motion. Mild regurgitation. No stenosis.    Tricuspid Valve: Normal structure. Moderate regurgitation. Estimated RVSP = 50 mmHg. No significant stenosis.    Pulmonic Valve: Grossly normal structure. Mild regurgitation. No stenosis.    Aorta: Aortic root sclerotic. Ascending aorta normal-sized. Aortic arch normal-sized. Descending aorta normal-sized.    IVC/SVC: Inferior vena cava is dilated (>2.1cm). Inferior vena cava collapses >50% during inspiration.    Pericardium: Normal structure. No evidence of pericardial effusion.    Prior Study: Prior study available for comparison. Prior study date: 4/19/22. No significant changes noted compared to the prior study.    4/19/2022   · Normal-sized LV. Low normal LV systolic function. Estimated EF 50- 55%. Wall motion appears grossly normal. Normal LV wall  thickness. LV diastolic function: patient in a-fib.  · Normal-sized RV. Normal RV systolic function.  · Mildly dilated LA. Normal doppler flow of pulmonary veins.  · Moderately dilated RA.  · Tricuspid aortic valve. Sclerotic aortic valve leaflets. Mild aortic valve regurgitation with a centrally directed jet. No aortic valve stenosis.  · Mild bileaflet thickening of the mitral valve.  · Mild to moderate mitral valve regurgitation.  · Tricuspid valve structure is normal. Mild to moderate tricuspid valve regurgitation. The regurgitation jet is central.  · Estimated RVSP = 46 mmHg.  · Normal pulmonic valve structure. Mild pulmonic valve regurgitation. No pulmonic valve stenosis.  · IVC is dilated (>2.1cm). IVC collapses <50% during inspiration.  · No evidence of pericardial effusion.  · Compred to the prior study of 12/10/2020, the ejection fraction is now low normal. The degree of mitral and tricuspid regurgitation have worsened.        Stress tests:  6/30/2021  Cardiac PET perfusion negative for ischemia and scar  Normal LV wall motion EF in size  EF reserve of -1  Moderate reduced global blood flow reserve 1.50        Cardiac cath:     Cardiac MRI 2018  Mildly dilated left atrial size with moderately dilated right atrial size  Normal LV cavity size with low normal systolic function  Left ventricular EF 53%  Focal mild hypokinesis of the mid apical inferolateral wall  Normal RV cavity size with preserved systolic function, RV EF 62%  No evidence of late gadolinium enhancement consistent with absence of fibrosis or scar  Trivial AI, mild MR, trace PI, mild TR  No significant pericardial effusion    Vascular studies:      Cardiac monitor:     ECG independently reviewed:     11/7/2023 AF 87 NSSTTWA  5/2/2023  nssttwa  10/18/2022 AF 98 nssttwa  4/19/2022 AF 83   Assessment and Plan        Alex Gonzalez is a 78 y.o. male      Diagnosis Plan   1. Longstanding persistent atrial fibrillation (CMS/HCC)  ECG 12  LEAD-OFFICE PERFORMED      2. Cardiomyopathy, unspecified type (CMS/HCC)            Persistent atrial fibrillation  Initially presented with right atrial enlargement and atrial flutter with tachycardia mediated LV dysfunction 2018 and declined rhythm control.    Yolanda in in AF with normal rate and no change in mild symptoms.  Continue Toprol XL 50 mg daily and Eliquis 5mg twice daily for stroke prevention.    Cardiomyopathy with recovered EF  Mild chronic HFpEF, TR, MED  MRI 2018 no late gadolinium enhancement, normal EF, moderate MED mild LAE.    Echo May 2023 EF 50 to 55%, mod TR, mod MED, mild LAE, similar to prior  Reassess with echo at next office visit.     He appears relatively euvolemic with mild minimal bilateral edema which is unchanged.  We will continue to monitor and can institute diuretics if needed but will avoid given his preference to avoid frequent urination.      Jeffery Pérez MD, FACC, RS  Cardiology and Cardiac Electrophysiology  [facility] Office: [phone]  [facility]: [facility]   11/7/2023

## 2023-11-07 ENCOUNTER — OFFICE VISIT (OUTPATIENT)
Dept: CARDIOLOGY | Facility: CLINIC | Age: 78
End: 2023-11-07
Payer: MEDICARE

## 2023-11-07 VITALS
HEIGHT: 71 IN | SYSTOLIC BLOOD PRESSURE: 130 MMHG | OXYGEN SATURATION: 99 % | HEART RATE: 71 BPM | WEIGHT: 162 LBS | DIASTOLIC BLOOD PRESSURE: 78 MMHG | BODY MASS INDEX: 22.68 KG/M2

## 2023-11-07 DIAGNOSIS — I48.11 LONGSTANDING PERSISTENT ATRIAL FIBRILLATION (CMS/HCC): Primary | ICD-10-CM

## 2023-11-07 DIAGNOSIS — I42.9 CARDIOMYOPATHY, UNSPECIFIED TYPE (CMS/HCC): ICD-10-CM

## 2023-11-07 PROCEDURE — 93000 ELECTROCARDIOGRAM COMPLETE: CPT | Performed by: INTERNAL MEDICINE

## 2023-11-07 PROCEDURE — 99214 OFFICE O/P EST MOD 30 MIN: CPT | Performed by: INTERNAL MEDICINE

## 2023-11-07 NOTE — LETTER
"November 7, 2023     Jeffery Kimble MD  [address]  [address]  [place] [address]    Patient: Alex Gonzalez  YOB: 1945  Date of Visit: 11/7/2023      Dear Dr. Kimble:    Thank you for referring Alex Gonzalez to me for evaluation. Below are my notes for this consultation.    If you have questions, please do not hesitate to call me. I look forward to following your patient along with you.         Sincerely,        Jeffery Pérez MD        CC: No Recipients    Jeffery Pérez MD  11/7/2023 10:38 AM  Signed     [facility] Cardiology  [place] Office Visit       Patient ID: Alex Gonzalez 78 y.o. male 1945  PCP: Jeffery Kimble MD      HPI     Alex Gonzalez is a 78 y.o. male who presents for cardiovascular and arrhythmia follow-up.    He has a past medical history significant for paroxysmal atrial flutter and now persistent atrial fibrillation, right atrial enlargement, and mild LV dysfunction initially in the setting of tachycardia.  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 He has declined ablation/rhythm control.    He presents for routine follow-up and was last seen here 5/2/2023.    He remains on Eliquis and metoprolol and has had no major bleeding problems. He has stable palpitations which he notices early in the morning and worse with stress. He denies sob and cp.      Medications     Current Outpatient Medications   Medication Instructions    ELIQUIS 5 mg tablet TAKE ONE TABLET BY MOUTH TWICE A DAY    metoprolol succinate XL (TOPROL-XL) 50 mg 24 hr tablet TAKE 1 TABLET BY MOUTH EVERY DAY         Allergies     Patient has no known allergies.     Vital Signs/Exam     Vitals:    11/07/23 1005   BP: 130/78   Pulse: 71   SpO2: 99%   Weight: 73.5 kg (162 lb)   Height: 1.803 m (5' 11\")     BP Readings from Last 3 Encounters:   11/07/23 130/78   05/02/23 120/80   05/02/23 120/80 "     Wt Readings from Last 3 Encounters:   11/07/23 73.5 kg (162 lb)   05/02/23 74.8 kg (165 lb)   05/02/23 72.6 kg (160 lb)        Gen: no distress  HEENT: no scleral icterus  Neck: no JVD, no carotid bruit  Lungs: clear bilaterally; no crackles, rhonchi, wheezing  Heart/Chest: irregularly irregular, no murmur  Extremities: mild/tr edema  Neuro: nonfocal, alert and oriented  Psych: normal mood and affect     Diagnostic Data   Diagnostic data personally reviewed. Available medical records were reviewed and updated where appropriate including laboratory data, imaging studies, and previous outpatient and inpatient records.  Counseling/education performed.      Labs:  Lab Results   Component Value Date    WBC 6.24 06/04/2018    HGB 13.9 06/04/2018     06/04/2018    ALT 34 06/04/2018    AST 31 06/04/2018     06/04/2018    K 5.1 06/04/2018     06/04/2018    CREATININE 1.2 06/04/2018    BUN 20 06/04/2018    CO2 24 06/04/2018    TSH 3.90 04/23/2018    INR 1.2 04/26/2018       Lab Results   Component Value Date    CHOL 128 04/22/2018    LDLCALC 65 04/22/2018    HDL 55 04/22/2018    TRIG 38 04/22/2018       Echocardiogram:   5/2/2023    Left Ventricle: Normal ventricle size. Normal wall thickness. Low normal systolic function. Estimated EF 50-55%. Wall motion appears grossly normal. Diastolic function: patient in a-fib.    Right Ventricle: Normal ventricle size. Normal systolic function.    Left Atrium: Mildly dilated atrium.    Right Atrium: Moderately dilated atrium.    Aortic Valve: Tricuspid valve.  Sclerotic leaflets. Mild regurgitation. No stenosis.    Mitral Valve: Normal leaflet structure. Normal leaflet motion. Mild regurgitation. No stenosis.    Tricuspid Valve: Normal structure. Moderate regurgitation. Estimated RVSP = 50 mmHg. No significant stenosis.    Pulmonic Valve: Grossly normal structure. Mild regurgitation. No stenosis.    Aorta: Aortic root sclerotic. Ascending aorta  normal-sized. Aortic arch normal-sized. Descending aorta normal-sized.    IVC/SVC: Inferior vena cava is dilated (>2.1cm). Inferior vena cava collapses >50% during inspiration.    Pericardium: Normal structure. No evidence of pericardial effusion.    Prior Study: Prior study available for comparison. Prior study date: 4/19/22. No significant changes noted compared to the prior study.    4/19/2022   · Normal-sized LV. Low normal LV systolic function. Estimated EF 50- 55%. Wall motion appears grossly normal. Normal LV wall thickness. LV diastolic function: patient in a-fib.  · Normal-sized RV. Normal RV systolic function.  · Mildly dilated LA. Normal doppler flow of pulmonary veins.  · Moderately dilated RA.  · Tricuspid aortic valve. Sclerotic aortic valve leaflets. Mild aortic valve regurgitation with a centrally directed jet. No aortic valve stenosis.  · Mild bileaflet thickening of the mitral valve.  · Mild to moderate mitral valve regurgitation.  · Tricuspid valve structure is normal. Mild to moderate tricuspid valve regurgitation. The regurgitation jet is central.  · Estimated RVSP = 46 mmHg.  · Normal pulmonic valve structure. Mild pulmonic valve regurgitation. No pulmonic valve stenosis.  · IVC is dilated (>2.1cm). IVC collapses <50% during inspiration.  · No evidence of pericardial effusion.  · Compred to the prior study of 12/10/2020, the ejection fraction is now low normal. The degree of mitral and tricuspid regurgitation have worsened.        Stress tests:  6/30/2021  Cardiac PET perfusion negative for ischemia and scar  Normal LV wall motion EF in size  EF reserve of -1  Moderate reduced global blood flow reserve 1.50        Cardiac cath:     Cardiac MRI 2018  Mildly dilated left atrial size with moderately dilated right atrial size  Normal LV cavity size with low normal systolic function  Left ventricular EF 53%  Focal mild hypokinesis of the mid apical inferolateral wall  Normal RV cavity size with  preserved systolic function, RV EF 62%  No evidence of late gadolinium enhancement consistent with absence of fibrosis or scar  Trivial AI, mild MR, trace PI, mild TR  No significant pericardial effusion    Vascular studies:      Cardiac monitor:     ECG independently reviewed:     11/7/2023 AF 87 NSSTTWA  5/2/2023  nssttwa  10/18/2022 AF 98 nssttwa  4/19/2022 AF 83   Assessment and Plan        Alex Gonzalez is a 78 y.o. male      Diagnosis Plan   1. Longstanding persistent atrial fibrillation (CMS/HCC)  ECG 12 LEAD-OFFICE PERFORMED      2. Cardiomyopathy, unspecified type (CMS/HCC)            Persistent atrial fibrillation  Initially presented with right atrial enlargement and atrial flutter with tachycardia mediated LV dysfunction 2018 and declined rhythm control.    Yolanda in in AF with normal rate and no change in mild symptoms.  Continue Toprol XL 50 mg daily and Eliquis 5mg twice daily for stroke prevention.    Cardiomyopathy with recovered EF  Mild chronic HFpEF, TR, MED  MRI 2018 no late gadolinium enhancement, normal EF, moderate MED mild LAE.    Echo May 2023 EF 50 to 55%, mod TR, mod MED, mild LAE, similar to prior  Reassess with echo at next office visit.     He appears relatively euvolemic with mild minimal bilateral edema which is unchanged.  We will continue to monitor and can institute diuretics if needed but will avoid given his preference to avoid frequent urination.      Jeffery Pérez MD, FACC, RS  Cardiology and Cardiac Electrophysiology  [facility] Gomez[facility] Office: [phone]  [facility] [place]: [facility]   11/7/2023

## 2023-11-20 RX ORDER — APIXABAN 5 MG/1
5 TABLET, FILM COATED ORAL 2 TIMES DAILY
Qty: 180 TABLET | Refills: 3 | Status: SHIPPED
Start: 2023-11-20 | End: 2024-12-02 | Stop reason: SDUPTHER

## 2024-04-09 RX ORDER — METOPROLOL SUCCINATE 50 MG/1
TABLET, EXTENDED RELEASE ORAL
Qty: 90 TABLET | Refills: 3 | Status: SHIPPED | OUTPATIENT
Start: 2024-04-09 | End: 2025-02-04 | Stop reason: SDUPTHER

## 2024-05-15 NOTE — PROGRESS NOTES
"   [facility] Cardiology  [place] Office Visit       Patient ID: Alex Gonzalez 79 y.o. male 1945  PCP: Jeffery Kimble MD      HPI     Alex Gonzalez is a 79 y.o. male who presents for cardiovascular and arrhythmia follow-up.    He has a past medical history significant for paroxysmal atrial flutter and now persistent atrial fibrillation, right atrial enlargement, and mild LV dysfunction initially in the setting of tachycardia.  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 He has declined ablation/rhythm control.    He presents for routine follow-up and was last seen here 11/7/2023.    He feels he has a bit more dizziness at times than he used to. He feels like his sense of balance is a little worse than it used to be. He feels like this may be related to seasonal allergies and sinus and nasal congestion type symptoms.        Medications     Current Outpatient Medications   Medication Instructions    ELIQUIS 5 mg, oral, 2 times daily    metoprolol succinate XL (TOPROL-XL) 50 mg 24 hr tablet TAKE 1 TABLET BY MOUTH EVERY DAY         Allergies     Patient has no known allergies.     Vital Signs/Exam     Vitals:    05/16/24 1104   BP: 130/80   BP Location: Left upper arm   Patient Position: Sitting   Pulse: 70   SpO2: 99%   Weight: 73.5 kg (162 lb)   Height: 1.803 m (5' 11\")       BP Readings from Last 3 Encounters:   05/16/24 130/80   05/16/24 (!) 150/110   11/07/23 130/78     Wt Readings from Last 3 Encounters:   05/16/24 73.5 kg (162 lb)   05/16/24 73 kg (161 lb)   11/07/23 73.5 kg (162 lb)        Gen: no distress  HEENT: no scleral icterus  Neck: no JVD, no carotid bruit  Lungs: clear bilaterally; no crackles, rhonchi, wheezing  Heart/Chest: irregularly irregular, no murmur  Extremities: mild/tr edema  Neuro: nonfocal, alert and oriented  Psych: normal mood and affect     Diagnostic Data "   Diagnostic data personally reviewed. Available medical records were reviewed and updated where appropriate including laboratory data, imaging studies, and previous outpatient and inpatient records.  Counseling/education performed.      Labs:  Lab Results   Component Value Date    WBC 6.24 06/04/2018    HGB 13.9 06/04/2018     06/04/2018    ALT 34 06/04/2018    AST 31 06/04/2018     06/04/2018    K 5.1 06/04/2018     06/04/2018    CREATININE 1.2 06/04/2018    BUN 20 06/04/2018    CO2 24 06/04/2018    TSH 3.90 04/23/2018    INR 1.2 04/26/2018       Lab Results   Component Value Date    CHOL 128 04/22/2018    LDLCALC 65 04/22/2018    HDL 55 04/22/2018    TRIG 38 04/22/2018       Echocardiogram:   5/16/2024  Prelim low normal ef      5/2/2023    Left Ventricle: Normal ventricle size. Normal wall thickness. Low normal systolic function. Estimated EF 50-55%. Wall motion appears grossly normal. Diastolic function: patient in a-fib.    Right Ventricle: Normal ventricle size. Normal systolic function.    Left Atrium: Mildly dilated atrium.    Right Atrium: Moderately dilated atrium.    Aortic Valve: Tricuspid valve.  Sclerotic leaflets. Mild regurgitation. No stenosis.    Mitral Valve: Normal leaflet structure. Normal leaflet motion. Mild regurgitation. No stenosis.    Tricuspid Valve: Normal structure. Moderate regurgitation. Estimated RVSP = 50 mmHg. No significant stenosis.    Pulmonic Valve: Grossly normal structure. Mild regurgitation. No stenosis.    Aorta: Aortic root sclerotic. Ascending aorta normal-sized. Aortic arch normal-sized. Descending aorta normal-sized.    IVC/SVC: Inferior vena cava is dilated (>2.1cm). Inferior vena cava collapses >50% during inspiration.    Pericardium: Normal structure. No evidence of pericardial effusion.    Prior Study: Prior study available for comparison. Prior study date: 4/19/22. No significant changes noted compared to the prior study.    4/19/2022    Normal-sized LV. Low normal LV systolic function. Estimated EF 50- 55%. Wall motion appears grossly normal. Normal LV wall thickness. LV diastolic function: patient in a-fib.  Normal-sized RV. Normal RV systolic function.  Mildly dilated LA. Normal doppler flow of pulmonary veins.  Moderately dilated RA.  Tricuspid aortic valve. Sclerotic aortic valve leaflets. Mild aortic valve regurgitation with a centrally directed jet. No aortic valve stenosis.  Mild bileaflet thickening of the mitral valve.  Mild to moderate mitral valve regurgitation.  Tricuspid valve structure is normal. Mild to moderate tricuspid valve regurgitation. The regurgitation jet is central.  Estimated RVSP = 46 mmHg.  Normal pulmonic valve structure. Mild pulmonic valve regurgitation. No pulmonic valve stenosis.  IVC is dilated (>2.1cm). IVC collapses <50% during inspiration.  No evidence of pericardial effusion.  Compred to the prior study of 12/10/2020, the ejection fraction is now low normal. The degree of mitral and tricuspid regurgitation have worsened.      Stress tests:  6/30/2021  Cardiac PET perfusion negative for ischemia and scar  Normal LV wall motion EF in size  EF reserve of -1  Moderate reduced global blood flow reserve 1.50        Cardiac cath:       Cardiac MRI 2018  Mildly dilated left atrial size with moderately dilated right atrial size  Normal LV cavity size with low normal systolic function  Left ventricular EF 53%  Focal mild hypokinesis of the mid apical inferolateral wall  Normal RV cavity size with preserved systolic function, RV EF 62%  No evidence of late gadolinium enhancement consistent with absence of fibrosis or scar  Trivial AI, mild MR, trace PI, mild TR  No significant pericardial effusion      Vascular studies:      Cardiac monitor:       ECG independently reviewed:   5/16/2024 AF 81 NSTTWA  11/7/2023 AF 87 NSSTTWA  5/2/2023  nssttwa  10/18/2022 AF 98 nssttwa  4/19/2022 AF 83   Assessment and Plan         Alex Gonzalez is a 79 y.o. male      Diagnosis Plan   1. Longstanding persistent atrial fibrillation (CMS/HCC)  [facility] MUSE ECG 12 lead (clinic performed)      2. Cardiomyopathy, unspecified type (CMS/HCC)              Persistent atrial fibrillation  Initially presented with right atrial enlargement and atrial flutter with tachycardia mediated LV dysfunction 2018 and declined rhythm control.    Yolanda in in AF with normal rate and no change in mild symptoms.  Continue Toprol XL 50 mg daily and Eliquis 5mg twice daily for stroke prevention.    Cardiomyopathy with recovered EF  Mild chronic HFpEF, TR, MED  MRI 2018 no late gadolinium enhancement, normal EF, moderate MED mild LAE.    Echo May 2023 EF 50 to 55%, mod TR, mod MED, mild LAE, similar to prior    Echo today pending, prelim low normal LV fxn.    Symptomatically stable and euvolemic.  We discussed that given his history of mild LV dysfunction additional therapy including ACE ARB Arni, MRA, SGLT2 might be helpful.  In the setting of low normal blood pressure and occasional dizziness lightheadedness I think we should avoid ACE ARB Arni MRA but SGLT2 inhibitor might be considered. We specifically discussed jardiance and farxiga and he felt like he would prefer not adding medications at this time.    We will plan 6 month follow-up and he will continue healthy diet and exercise habits.      Jeffery Pérez MD, FACC, RS  Cardiology and Cardiac Electrophysiology  [facility] [place] Office: [phone]  [facility] [place]: [facility]   5/16/2024

## 2024-05-16 ENCOUNTER — OFFICE VISIT (OUTPATIENT)
Dept: CARDIOLOGY | Facility: CLINIC | Age: 79
End: 2024-05-16
Payer: MEDICARE

## 2024-05-16 ENCOUNTER — HOSPITAL ENCOUNTER (OUTPATIENT)
Dept: CARDIOLOGY | Facility: CLINIC | Age: 79
Discharge: HOME | End: 2024-05-16
Attending: INTERNAL MEDICINE
Payer: MEDICARE

## 2024-05-16 VITALS
DIASTOLIC BLOOD PRESSURE: 110 MMHG | HEIGHT: 71 IN | WEIGHT: 161 LBS | SYSTOLIC BLOOD PRESSURE: 150 MMHG | BODY MASS INDEX: 22.54 KG/M2

## 2024-05-16 VITALS
WEIGHT: 162 LBS | HEART RATE: 70 BPM | HEIGHT: 71 IN | SYSTOLIC BLOOD PRESSURE: 130 MMHG | DIASTOLIC BLOOD PRESSURE: 80 MMHG | OXYGEN SATURATION: 99 % | BODY MASS INDEX: 22.68 KG/M2

## 2024-05-16 DIAGNOSIS — I42.9 CARDIOMYOPATHY, UNSPECIFIED TYPE (CMS/HCC): ICD-10-CM

## 2024-05-16 DIAGNOSIS — I48.11 LONGSTANDING PERSISTENT ATRIAL FIBRILLATION (CMS/HCC): Primary | ICD-10-CM

## 2024-05-16 DIAGNOSIS — I48.11 LONGSTANDING PERSISTENT ATRIAL FIBRILLATION (CMS/HCC): ICD-10-CM

## 2024-05-16 LAB
AORTIC ROOT ANNULUS: 3.1 CM
AORTIC VALVE MEAN VELOCITY: 0.71 M/S
AORTIC VALVE VELOCITY TIME INTEGRAL: 21.3 CM
ASCENDING AORTA: 3.4 CM
AV MEAN GRADIENT: 2 MMHG
AV PEAK GRADIENT: 4 MMHG
AV PEAK VELOCITY-S: 1.03 M/S
AV VALVE AREA INDEX: 0.93
AV VALVE AREA: 1.36 CM2
AV VELOCITY RATIO: 0.62
AVA (VTI): 1.77 CM2
BSA FOR ECHO PROCEDURE: 1.91 M2
CUSP SEPARATION: 1.8 CM
DOP CALC LVOT STROKE VOLUME: 37.69 CM3
E WAVE DECELERATION TIME: 265 MS
EDV (BP): 80.5 CM3
EF (A4C): 47.6 %
EF A2C: 51.1 %
EJECTION FRACTION: 47.8 %
EST RIGHT VENT SYSTOLIC PRESSURE BY TRICUSPID REGURGITATION JET: 46 MMHG
ESV (BP): 42 CM3
FRACTIONAL SHORTENING: 23.47 %
INTERVENTRICULAR SEPTUM: 0.99 CM
IVC-EXP: 2.6 CM
LA ESV (BP): 58.5 CM3
LA ESV INDEX (A2C): 23.19 CM3/M2
LA ESV INDEX (BP): 30.63 CM3/M2
LA/AORTA RATIO: 1.26
LAAS-AP2: 17.8 CM2
LAAS-AP4: 23.6 CM2
LAD 2D: 3.9 CM
LALD A4C: 5.86 CM
LALD A4C: 6.07 CM
LAV-S: 44.3 CM3
LEFT ATRIUM VOLUME INDEX: 39.16 CM3/M2
LEFT ATRIUM VOLUME: 74.8 CM3
LEFT INTERNAL DIMENSION IN SYSTOLE: 3.13 CM (ref 2.71–4.11)
LEFT VENTRICLE DIASTOLIC VOLUME INDEX: 41.47 CM3/M2
LEFT VENTRICLE DIASTOLIC VOLUME: 79.2 CM3
LEFT VENTRICLE SYSTOLIC VOLUME INDEX: 21.73 CM3/M2
LEFT VENTRICLE SYSTOLIC VOLUME: 41.5 CM3
LEFT VENTRICULAR INTERNAL DIMENSION IN DIASTOLE: 4.09 CM (ref 4.6–6.39)
LEFT VENTRICULAR POSTERIOR WALL IN END DIASTOLE: 1.05 CM (ref 0.6–1.12)
LV DIASTOLIC VOLUME: 82 CM3
LV ESV (APICAL 2 CHAMBER): 40.2 CM3
LVAD-AP2: 27.5 CM2
LVAD-AP4: 26.6 CM2
LVAS-AP2: 17.9 CM2
LVAS-AP4: 18.6 CM2
LVEDVI(A2C): 42.93 CM3/M2
LVEDVI(BP): 42.15 CM3/M2
LVESVI(A2C): 21.05 CM3/M2
LVESVI(BP): 21.99 CM3/M2
LVLD-AP2: 7.82 CM
LVLD-AP4: 7.73 CM
LVLS-AP2: 6.93 CM
LVLS-AP4: 7.37 CM
LVOT 2D: 1.9 CM
LVOT A: 2.84 CM2
LVOT MG: 1 MMHG
LVOT MV: 0.43 M/S
LVOT PEAK VELOCITY: 0.63 M/S
LVOT PG: 2 MMHG
LVOT STROKE VOLUME INDEX: 19.73 ML/M2
LVOT VTI: 13.3 CM
MLH CV ECHO AVA INDEX VELOCITY RATIO: 0.7
MV PEAK E VEL: 0.91 M/S
POSTERIOR WALL: 1.05 CM
PULMONARY REGURGITATION LATE DIASTOLIC VELOCITY: 1.29 M/S
PV PEAK GRADIENT: 2 MMHG
PV PV: 0.63 M/S
QRS DURATION: 84
QT INTERVAL: 380
QTC CALCULATION(BAZETT): 441
R AXIS: 88
RAP: 15 MMHG
RVOT VMAX: 0.47 M/S
SEPTAL TISSUE DOPPLER FREE WALL LATE DIA VELOCITY (APICAL 4 CHAMBER VIEW): 0.1 M/S
T WAVE AXIS: 89
TAPSE: 1.3 CM
TR MAX PG: 31.36 MMHG
TRICUSPID VALVE PEAK REGURGITATION VELOCITY: 2.8 M/S
VENTRICULAR RATE: 81
Z-SCORE OF LEFT VENTRICULAR DIMENSION IN END DIASTOLE: -2.81
Z-SCORE OF LEFT VENTRICULAR DIMENSION IN END SYSTOLE: -0.5
Z-SCORE OF LEFT VENTRICULAR POSTERIOR WALL IN END DIASTOLE: 1.29

## 2024-05-16 PROCEDURE — 99214 OFFICE O/P EST MOD 30 MIN: CPT | Performed by: INTERNAL MEDICINE

## 2024-05-16 PROCEDURE — 93306 TTE W/DOPPLER COMPLETE: CPT | Performed by: INTERNAL MEDICINE

## 2024-05-16 PROCEDURE — 93000 ELECTROCARDIOGRAM COMPLETE: CPT | Performed by: INTERNAL MEDICINE

## 2024-05-16 NOTE — LETTER
May 16, 2024     Jeffery Kimble MD  [address]  [address]  [place] [address]    Patient: Alex Gonzalez  YOB: 1945  Date of Visit: 5/16/2024      Dear Dr. Kimble:    Thank you for referring Alex Gonzalez to me for evaluation. Below are my notes for this consultation.    If you have questions, please do not hesitate to call me. I look forward to following your patient along with you.         Sincerely,        Jeffery Pérez MD        CC: No Recipients    Jeffery Pérez MD  5/16/2024 11:48 AM  Sign when Signing Visit     [facility] Cardiology  [place] Office Visit       Patient ID: Alex Gonzalez 79 y.o. male 1945  PCP: Jeffery Kimble MD      HPI     Alex Gonzalez is a 79 y.o. male who presents for cardiovascular and arrhythmia follow-up.    He has a past medical history significant for paroxysmal atrial flutter and now persistent atrial fibrillation, right atrial enlargement, and mild LV dysfunction initially in the setting of tachycardia.  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 He has declined ablation/rhythm control.    He presents for routine follow-up and was last seen here 11/7/2023.    He feels he has a bit more dizziness at times than he used to. He feels like his sense of balance is a little worse than it used to be. He feels like this may be related to seasonal allergies and sinus and nasal congestion type symptoms.        Medications     Current Outpatient Medications   Medication Instructions   • ELIQUIS 5 mg, oral, 2 times daily   • metoprolol succinate XL (TOPROL-XL) 50 mg 24 hr tablet TAKE 1 TABLET BY MOUTH EVERY DAY         Allergies     Patient has no known allergies.     Vital Signs/Exam     Vitals:    05/16/24 1104   BP: 130/80   BP Location: Left upper arm   Patient Position: Sitting   Pulse: 70   SpO2: 99%   Weight: 73.5 kg (162 lb)   Height: 1.803 m  "(5' 11\")       BP Readings from Last 3 Encounters:   05/16/24 130/80   05/16/24 (!) 150/110   11/07/23 130/78     Wt Readings from Last 3 Encounters:   05/16/24 73.5 kg (162 lb)   05/16/24 73 kg (161 lb)   11/07/23 73.5 kg (162 lb)        Gen: no distress  HEENT: no scleral icterus  Neck: no JVD, no carotid bruit  Lungs: clear bilaterally; no crackles, rhonchi, wheezing  Heart/Chest: irregularly irregular, no murmur  Extremities: mild/tr edema  Neuro: nonfocal, alert and oriented  Psych: normal mood and affect     Diagnostic Data   Diagnostic data personally reviewed. Available medical records were reviewed and updated where appropriate including laboratory data, imaging studies, and previous outpatient and inpatient records.  Counseling/education performed.      Labs:  Lab Results   Component Value Date    WBC 6.24 06/04/2018    HGB 13.9 06/04/2018     06/04/2018    ALT 34 06/04/2018    AST 31 06/04/2018     06/04/2018    K 5.1 06/04/2018     06/04/2018    CREATININE 1.2 06/04/2018    BUN 20 06/04/2018    CO2 24 06/04/2018    TSH 3.90 04/23/2018    INR 1.2 04/26/2018       Lab Results   Component Value Date    CHOL 128 04/22/2018    LDLCALC 65 04/22/2018    HDL 55 04/22/2018    TRIG 38 04/22/2018       Echocardiogram:   5/16/2024  Prelim low normal ef      5/2/2023  •  Left Ventricle: Normal ventricle size. Normal wall thickness. Low normal systolic function. Estimated EF 50-55%. Wall motion appears grossly normal. Diastolic function: patient in a-fib.  •  Right Ventricle: Normal ventricle size. Normal systolic function.  •  Left Atrium: Mildly dilated atrium.  •  Right Atrium: Moderately dilated atrium.  •  Aortic Valve: Tricuspid valve.  Sclerotic leaflets. Mild regurgitation. No stenosis.  •  Mitral Valve: Normal leaflet structure. Normal leaflet motion. Mild regurgitation. No stenosis.  •  Tricuspid Valve: Normal structure. Moderate regurgitation. Estimated RVSP = 50 mmHg. No significant " stenosis.  •  Pulmonic Valve: Grossly normal structure. Mild regurgitation. No stenosis.  •  Aorta: Aortic root sclerotic. Ascending aorta normal-sized. Aortic arch normal-sized. Descending aorta normal-sized.  •  IVC/SVC: Inferior vena cava is dilated (>2.1cm). Inferior vena cava collapses >50% during inspiration.  •  Pericardium: Normal structure. No evidence of pericardial effusion.  •  Prior Study: Prior study available for comparison. Prior study date: 4/19/22. No significant changes noted compared to the prior study.    4/19/2022   Normal-sized LV. Low normal LV systolic function. Estimated EF 50- 55%. Wall motion appears grossly normal. Normal LV wall thickness. LV diastolic function: patient in a-fib.  Normal-sized RV. Normal RV systolic function.  Mildly dilated LA. Normal doppler flow of pulmonary veins.  Moderately dilated RA.  Tricuspid aortic valve. Sclerotic aortic valve leaflets. Mild aortic valve regurgitation with a centrally directed jet. No aortic valve stenosis.  Mild bileaflet thickening of the mitral valve.  Mild to moderate mitral valve regurgitation.  Tricuspid valve structure is normal. Mild to moderate tricuspid valve regurgitation. The regurgitation jet is central.  Estimated RVSP = 46 mmHg.  Normal pulmonic valve structure. Mild pulmonic valve regurgitation. No pulmonic valve stenosis.  IVC is dilated (>2.1cm). IVC collapses <50% during inspiration.  No evidence of pericardial effusion.  Compred to the prior study of 12/10/2020, the ejection fraction is now low normal. The degree of mitral and tricuspid regurgitation have worsened.      Stress tests:  6/30/2021  Cardiac PET perfusion negative for ischemia and scar  Normal LV wall motion EF in size  EF reserve of -1  Moderate reduced global blood flow reserve 1.50        Cardiac cath:       Cardiac MRI 2018  Mildly dilated left atrial size with moderately dilated right atrial size  Normal LV cavity size with low normal systolic  function  Left ventricular EF 53%  Focal mild hypokinesis of the mid apical inferolateral wall  Normal RV cavity size with preserved systolic function, RV EF 62%  No evidence of late gadolinium enhancement consistent with absence of fibrosis or scar  Trivial AI, mild MR, trace PI, mild TR  No significant pericardial effusion      Vascular studies:      Cardiac monitor:       ECG independently reviewed:   5/16/2024 AF 81 NSTTWA  11/7/2023 AF 87 NSSTTWA  5/2/2023  nssttwa  10/18/2022 AF 98 nssttwa  4/19/2022 AF 83   Assessment and Plan        Alex Gonzalez is a 79 y.o. male      Diagnosis Plan   1. Longstanding persistent atrial fibrillation (CMS/HCC)  [facility] MUSE ECG 12 lead (clinic performed)      2. Cardiomyopathy, unspecified type (CMS/HCC)              Persistent atrial fibrillation  Initially presented with right atrial enlargement and atrial flutter with tachycardia mediated LV dysfunction 2018 and declined rhythm control.    Yolanda in in AF with normal rate and no change in mild symptoms.  Continue Toprol XL 50 mg daily and Eliquis 5mg twice daily for stroke prevention.    Cardiomyopathy with recovered EF  Mild chronic HFpEF, TR, MED  MRI 2018 no late gadolinium enhancement, normal EF, moderate MED mild LAE.    Echo May 2023 EF 50 to 55%, mod TR, mod MED, mild LAE, similar to prior    Echo today pending, prelim low normal LV fxn.    Symptomatically stable and euvolemic.  We discussed that given his history of mild LV dysfunction additional therapy including ACE ARB Arni, MRA, SGLT2 might be helpful.  In the setting of low normal blood pressure and occasional dizziness lightheadedness I think we should avoid ACE ARB Arni MRA but SGLT2 inhibitor might be considered. We specifically discussed jardiance and farxiga and he felt like he would prefer not adding medications at this time.    We will plan 6 month follow-up and he will continue healthy diet and exercise habits.      Jeffery Pérez MD, FACC,  RS  Cardiology and Cardiac Electrophysiology  [facility] [place] Office: [phone]  Primary [facility] [place]: [facility]   5/16/2024

## 2024-11-19 NOTE — PROGRESS NOTES
"   [facility] Cardiology  [place] Office Visit       Patient ID: Alex Gonzalez 79 y.o. male 1945  PCP: Jeffery Kimble MD      HPI     Alex Gonzalez is a 79 y.o. male who presents for cardiovascular and arrhythmia follow-up.    He has a past medical history significant for paroxysmal atrial flutter and now persistent atrial fibrillation, right atrial enlargement, and mild LV dysfunction initially in the setting of tachycardia.  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 He declined ablation/rhythm control.    He presents for routine follow-up and was last seen here 5/16/2024.     At last visit his echo showed EF 45-50% we discussed ace/arb/arni and sglt2 and decided against it.  He's doing well and has no major complaints.   He is a little more cautious with steps and is easily used to be but remains quite active and has not had any palpitations shortness of breath or other major complaints.           Medications     Current Outpatient Medications   Medication Instructions    ELIQUIS 5 mg, oral, 2 times daily    metoprolol succinate XL (TOPROL-XL) 50 mg 24 hr tablet TAKE 1 TABLET BY MOUTH EVERY DAY         Allergies     Patient has no known allergies.     Vital Signs/Exam     Vitals:    11/21/24 0912   BP: (!) 140/82   Pulse: 67   SpO2: 99%   Weight: 72.1 kg (159 lb)   Height: 1.803 m (5' 11\")         BP Readings from Last 3 Encounters:   11/21/24 (!) 140/82   05/16/24 (!) 150/110   05/16/24 130/80     Wt Readings from Last 3 Encounters:   11/21/24 72.1 kg (159 lb)   05/16/24 73 kg (161 lb)   05/16/24 73.5 kg (162 lb)        Gen: no distress  HEENT: no scleral icterus  Neck: no JVD, no carotid bruit  Lungs: clear bilaterally; no crackles, rhonchi, wheezing  Heart/Chest: irregularly irregular, no murmur  Extremities: mild/tr edema  Neuro: nonfocal, alert and oriented  Psych: normal mood and " affect     Diagnostic Data   Diagnostic data personally reviewed. Available medical records were reviewed and updated where appropriate including laboratory data, imaging studies, and previous outpatient and inpatient records.  Counseling/education performed.      Labs:  Lab Results   Component Value Date    WBC 6.24 06/04/2018    HGB 13.9 06/04/2018     06/04/2018    ALT 34 06/04/2018    AST 31 06/04/2018     06/04/2018    K 5.1 06/04/2018     06/04/2018    CREATININE 1.2 06/04/2018    BUN 20 06/04/2018    CO2 24 06/04/2018    TSH 3.90 04/23/2018    INR 1.2 04/26/2018       Lab Results   Component Value Date    CHOL 128 04/22/2018    LDLCALC 65 04/22/2018    HDL 55 04/22/2018    TRIG 38 04/22/2018       Echocardiogram:   5/16/2024    Left Ventricle: Normal ventricle size. Normal wall thickness. Mildly decreased systolic function. Estimated EF 45-50%. Diffuse hypokinesis. Abnormal septal motion. Diastolic function: patient in a-fib.    Right Ventricle: Normal ventricle size. Normal systolic function.    Left Atrium: Mildly dilated atrium.    Right Atrium: Moderately dilated atrium.    Aortic Valve: Tricuspid valve.  Sclerotic leaflets. Mild regurgitation. No stenosis. Calculated dimensionless index = 0.62.    Mitral Valve: Mild bileaflet thickening. Normal leaflet motion. Mild regurgitation. No stenosis.    Tricuspid Valve: Normal structure. Mild to moderate regurgitation. Estimated RVSP = 46 mmHg.    IVC/SVC: Inferior vena cava is dilated (>2.1cm). Inferior vena cava collapses <50% during inspiration.    Prior Study: Prior study available for comparison. Prior study date: 5/2/2023.  Compared to the prior study, overall LV function appears mildly reduced on the current study.    5/2/2023    Left Ventricle: Normal ventricle size. Normal wall thickness. Low normal systolic function. Estimated EF 50-55%. Wall motion appears grossly normal. Diastolic function: patient in a-fib.    Right Ventricle:  Normal ventricle size. Normal systolic function.    Left Atrium: Mildly dilated atrium.    Right Atrium: Moderately dilated atrium.    Aortic Valve: Tricuspid valve.  Sclerotic leaflets. Mild regurgitation. No stenosis.    Mitral Valve: Normal leaflet structure. Normal leaflet motion. Mild regurgitation. No stenosis.    Tricuspid Valve: Normal structure. Moderate regurgitation. Estimated RVSP = 50 mmHg. No significant stenosis.    Pulmonic Valve: Grossly normal structure. Mild regurgitation. No stenosis.    Aorta: Aortic root sclerotic. Ascending aorta normal-sized. Aortic arch normal-sized. Descending aorta normal-sized.    IVC/SVC: Inferior vena cava is dilated (>2.1cm). Inferior vena cava collapses >50% during inspiration.    Pericardium: Normal structure. No evidence of pericardial effusion.    Prior Study: Prior study available for comparison. Prior study date: 4/19/22. No significant changes noted compared to the prior study.    4/19/2022   Normal-sized LV. Low normal LV systolic function. Estimated EF 50- 55%. Wall motion appears grossly normal. Normal LV wall thickness. LV diastolic function: patient in a-fib.  Normal-sized RV. Normal RV systolic function.  Mildly dilated LA. Normal doppler flow of pulmonary veins.  Moderately dilated RA.  Tricuspid aortic valve. Sclerotic aortic valve leaflets. Mild aortic valve regurgitation with a centrally directed jet. No aortic valve stenosis.  Mild bileaflet thickening of the mitral valve.  Mild to moderate mitral valve regurgitation.  Tricuspid valve structure is normal. Mild to moderate tricuspid valve regurgitation. The regurgitation jet is central.  Estimated RVSP = 46 mmHg.  Normal pulmonic valve structure. Mild pulmonic valve regurgitation. No pulmonic valve stenosis.  IVC is dilated (>2.1cm). IVC collapses <50% during inspiration.  No evidence of pericardial effusion.  Compred to the prior study of 12/10/2020, the ejection fraction is now low normal. The degree  of mitral and tricuspid regurgitation have worsened.      Stress tests:  6/30/2021  Cardiac PET perfusion negative for ischemia and scar  Normal LV wall motion EF in size  EF reserve of -1  Moderate reduced global blood flow reserve 1.50        Cardiac cath:       Cardiac MRI 2018  Mildly dilated left atrial size with moderately dilated right atrial size  Normal LV cavity size with low normal systolic function  Left ventricular EF 53%  Focal mild hypokinesis of the mid apical inferolateral wall  Normal RV cavity size with preserved systolic function, RV EF 62%  No evidence of late gadolinium enhancement consistent with absence of fibrosis or scar  Trivial AI, mild MR, trace PI, mild TR  No significant pericardial effusion      Vascular studies:      Cardiac monitor:       ECG independently reviewed:   11/21/2024 AF 93 minor NSSTTWA  5/16/2024 AF 81 NSTTWA  11/7/2023 AF 87 NSSTTWA  5/2/2023  nssttwa  10/18/2022 AF 98 nssttwa  4/19/2022 AF 83   Assessment and Plan        Alex Gonzalez is a 79 y.o. male      Diagnosis Plan   1. Cardiomyopathy, unspecified type (CMS/HCC)  [facility] MUSE ECG 12 lead (clinic performed)      2. Longstanding persistent atrial fibrillation (CMS/HCC)  [facility] MUSE ECG 12 lead (clinic performed)            Persistent atrial fibrillation  Initially presented with right atrial enlargement and atrial flutter with tachycardia mediated LV dysfunction 2018 and declined rhythm control.    Yolanda in in AF with normal rate and no change in mild symptoms.  Continue Toprol XL 50 mg daily and Eliquis 5mg twice daily for stroke prevention.    Cardiomyopathy with improved and variable EF  Mild chronic HFpEF, TR, MED  MRI 2018 no late gadolinium enhancement, normal EF, moderate MED mild LAE.    Echo May 2023 EF 50 to 55%, mod TR, mod MED, mild LAE, similar to prior  Echo May 2024 EF 45-50%      Symptomatically doing very well with no concerning symptoms and euvolemia.  Will continue metoprolol succinate  50 mg daily and we again discussed GDMT given cardiomyopathy/mild LV dysfunction.  His EF has been variable over the years but initially improved and at last check in May LVEF was 45 to 50%.  I ultimately recommended he start low-dose ARB but he continues at this time to prefer to limit medications which is reasonable.  He has had occasional dizziness and lightheadedness and adding additional therapy may exacerbate that.  Ultimately we we will plan to reassess LV function with echo at next visit in 6 months and he will consider if he wishes to start additional GDMT depending somewhat on progression or stability of LV function.      Jeffery Pérez MD, FACC, RS  Cardiology and Cardiac Electrophysiology  [facility] [place] Office: [phone]  Primary [facility] [place]: [facility]   11/21/2024

## 2024-11-21 ENCOUNTER — OFFICE VISIT (OUTPATIENT)
Dept: CARDIOLOGY | Facility: CLINIC | Age: 79
End: 2024-11-21
Payer: MEDICARE

## 2024-11-21 VITALS
OXYGEN SATURATION: 99 % | BODY MASS INDEX: 22.26 KG/M2 | HEART RATE: 67 BPM | HEIGHT: 71 IN | WEIGHT: 159 LBS | SYSTOLIC BLOOD PRESSURE: 140 MMHG | DIASTOLIC BLOOD PRESSURE: 82 MMHG

## 2024-11-21 DIAGNOSIS — I42.9 CARDIOMYOPATHY, UNSPECIFIED TYPE (CMS/HCC): Primary | ICD-10-CM

## 2024-11-21 DIAGNOSIS — I48.11 LONGSTANDING PERSISTENT ATRIAL FIBRILLATION (CMS/HCC): ICD-10-CM

## 2024-11-21 LAB
QRS DURATION: 90
QT INTERVAL: 374
QTC CALCULATION(BAZETT): 465
R AXIS: 84
T WAVE AXIS: 89
VENTRICULAR RATE: 93

## 2024-11-21 PROCEDURE — 99214 OFFICE O/P EST MOD 30 MIN: CPT | Performed by: INTERNAL MEDICINE

## 2024-11-21 PROCEDURE — 93000 ELECTROCARDIOGRAM COMPLETE: CPT | Performed by: INTERNAL MEDICINE

## 2024-11-21 NOTE — LETTER
November 21, 2024     Jeffery Kimble MD  [address]  [address]  [place] [address]    Patient: Alex Gonzalez  YOB: 1945  Date of Visit: 11/21/2024      Dear Dr. iKmble:    Thank you for referring Alex Gonzalez to me for evaluation. Below are my notes for this consultation.    If you have questions, please do not hesitate to call me. I look forward to following your patient along with you.         Sincerely,        Jeffery Pérez MD        CC: No Recipients    Jeffery Pérez MD  11/21/2024  9:54 AM  Sign when Signing Visit     [facility] Cardiology  [place] Office Visit       Patient ID: Alex Gonzalez 79 y.o. male 1945  PCP: Jeffery Kimble MD      HPI     Alex Gonzalez is a 79 y.o. male who presents for cardiovascular and arrhythmia follow-up.    He has a past medical history significant for paroxysmal atrial flutter and now persistent atrial fibrillation, right atrial enlargement, and mild LV dysfunction initially in the setting of tachycardia.  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 He declined ablation/rhythm control.    He presents for routine follow-up and was last seen here 5/16/2024.     At last visit his echo showed EF 45-50% we discussed ace/arb/arni and sglt2 and decided against it.  He's doing well and has no major complaints.   He is a little more cautious with steps and is easily used to be but remains quite active and has not had any palpitations shortness of breath or other major complaints.           Medications     Current Outpatient Medications   Medication Instructions   • ELIQUIS 5 mg, oral, 2 times daily   • metoprolol succinate XL (TOPROL-XL) 50 mg 24 hr tablet TAKE 1 TABLET BY MOUTH EVERY DAY         Allergies     Patient has no known allergies.     Vital Signs/Exam     Vitals:    11/21/24 0912   BP: (!) 140/82   Pulse: 67   SpO2: 99%   Weight:  "72.1 kg (159 lb)   Height: 1.803 m (5' 11\")         BP Readings from Last 3 Encounters:   11/21/24 (!) 140/82   05/16/24 (!) 150/110   05/16/24 130/80     Wt Readings from Last 3 Encounters:   11/21/24 72.1 kg (159 lb)   05/16/24 73 kg (161 lb)   05/16/24 73.5 kg (162 lb)        Gen: no distress  HEENT: no scleral icterus  Neck: no JVD, no carotid bruit  Lungs: clear bilaterally; no crackles, rhonchi, wheezing  Heart/Chest: irregularly irregular, no murmur  Extremities: mild/tr edema  Neuro: nonfocal, alert and oriented  Psych: normal mood and affect     Diagnostic Data   Diagnostic data personally reviewed. Available medical records were reviewed and updated where appropriate including laboratory data, imaging studies, and previous outpatient and inpatient records.  Counseling/education performed.      Labs:  Lab Results   Component Value Date    WBC 6.24 06/04/2018    HGB 13.9 06/04/2018     06/04/2018    ALT 34 06/04/2018    AST 31 06/04/2018     06/04/2018    K 5.1 06/04/2018     06/04/2018    CREATININE 1.2 06/04/2018    BUN 20 06/04/2018    CO2 24 06/04/2018    TSH 3.90 04/23/2018    INR 1.2 04/26/2018       Lab Results   Component Value Date    CHOL 128 04/22/2018    LDLCALC 65 04/22/2018    HDL 55 04/22/2018    TRIG 38 04/22/2018       Echocardiogram:   5/16/2024  •  Left Ventricle: Normal ventricle size. Normal wall thickness. Mildly decreased systolic function. Estimated EF 45-50%. Diffuse hypokinesis. Abnormal septal motion. Diastolic function: patient in a-fib.  •  Right Ventricle: Normal ventricle size. Normal systolic function.  •  Left Atrium: Mildly dilated atrium.  •  Right Atrium: Moderately dilated atrium.  •  Aortic Valve: Tricuspid valve.  Sclerotic leaflets. Mild regurgitation. No stenosis. Calculated dimensionless index = 0.62.  •  Mitral Valve: Mild bileaflet thickening. Normal leaflet motion. Mild regurgitation. No stenosis.  •  Tricuspid Valve: Normal structure. Mild to " moderate regurgitation. Estimated RVSP = 46 mmHg.  •  IVC/SVC: Inferior vena cava is dilated (>2.1cm). Inferior vena cava collapses <50% during inspiration.  •  Prior Study: Prior study available for comparison. Prior study date: 5/2/2023.  Compared to the prior study, overall LV function appears mildly reduced on the current study.    5/2/2023  •  Left Ventricle: Normal ventricle size. Normal wall thickness. Low normal systolic function. Estimated EF 50-55%. Wall motion appears grossly normal. Diastolic function: patient in a-fib.  •  Right Ventricle: Normal ventricle size. Normal systolic function.  •  Left Atrium: Mildly dilated atrium.  •  Right Atrium: Moderately dilated atrium.  •  Aortic Valve: Tricuspid valve.  Sclerotic leaflets. Mild regurgitation. No stenosis.  •  Mitral Valve: Normal leaflet structure. Normal leaflet motion. Mild regurgitation. No stenosis.  •  Tricuspid Valve: Normal structure. Moderate regurgitation. Estimated RVSP = 50 mmHg. No significant stenosis.  •  Pulmonic Valve: Grossly normal structure. Mild regurgitation. No stenosis.  •  Aorta: Aortic root sclerotic. Ascending aorta normal-sized. Aortic arch normal-sized. Descending aorta normal-sized.  •  IVC/SVC: Inferior vena cava is dilated (>2.1cm). Inferior vena cava collapses >50% during inspiration.  •  Pericardium: Normal structure. No evidence of pericardial effusion.  •  Prior Study: Prior study available for comparison. Prior study date: 4/19/22. No significant changes noted compared to the prior study.    4/19/2022   Normal-sized LV. Low normal LV systolic function. Estimated EF 50- 55%. Wall motion appears grossly normal. Normal LV wall thickness. LV diastolic function: patient in a-fib.  Normal-sized RV. Normal RV systolic function.  Mildly dilated LA. Normal doppler flow of pulmonary veins.  Moderately dilated RA.  Tricuspid aortic valve. Sclerotic aortic valve leaflets. Mild aortic valve regurgitation with a centrally  directed jet. No aortic valve stenosis.  Mild bileaflet thickening of the mitral valve.  Mild to moderate mitral valve regurgitation.  Tricuspid valve structure is normal. Mild to moderate tricuspid valve regurgitation. The regurgitation jet is central.  Estimated RVSP = 46 mmHg.  Normal pulmonic valve structure. Mild pulmonic valve regurgitation. No pulmonic valve stenosis.  IVC is dilated (>2.1cm). IVC collapses <50% during inspiration.  No evidence of pericardial effusion.  Compred to the prior study of 12/10/2020, the ejection fraction is now low normal. The degree of mitral and tricuspid regurgitation have worsened.      Stress tests:  6/30/2021  Cardiac PET perfusion negative for ischemia and scar  Normal LV wall motion EF in size  EF reserve of -1  Moderate reduced global blood flow reserve 1.50        Cardiac cath:       Cardiac MRI 2018  Mildly dilated left atrial size with moderately dilated right atrial size  Normal LV cavity size with low normal systolic function  Left ventricular EF 53%  Focal mild hypokinesis of the mid apical inferolateral wall  Normal RV cavity size with preserved systolic function, RV EF 62%  No evidence of late gadolinium enhancement consistent with absence of fibrosis or scar  Trivial AI, mild MR, trace PI, mild TR  No significant pericardial effusion      Vascular studies:      Cardiac monitor:       ECG independently reviewed:   11/21/2024 AF 93 minor NSSTTWA  5/16/2024 AF 81 NSTTWA  11/7/2023 AF 87 NSSTTWA  5/2/2023  nssttwa  10/18/2022 AF 98 nssttwa  4/19/2022 AF 83   Assessment and Plan        Alex Gonzalez is a 79 y.o. male      Diagnosis Plan   1. Cardiomyopathy, unspecified type (CMS/HCC)  [facility] MUSE ECG 12 lead (clinic performed)      2. Longstanding persistent atrial fibrillation (CMS/HCC)  [facility] MUSE ECG 12 lead (clinic performed)            Persistent atrial fibrillation  Initially presented with right atrial enlargement and atrial flutter with tachycardia  mediated LV dysfunction 2018 and declined rhythm control.    Yolanda in in AF with normal rate and no change in mild symptoms.  Continue Toprol XL 50 mg daily and Eliquis 5mg twice daily for stroke prevention.    Cardiomyopathy with improved and variable EF  Mild chronic HFpEF, TR, MED  MRI 2018 no late gadolinium enhancement, normal EF, moderate MED mild LAE.    Echo May 2023 EF 50 to 55%, mod TR, mod MED, mild LAE, similar to prior  Echo May 2024 EF 45-50%      Symptomatically doing very well with no concerning symptoms and euvolemia.  Will continue metoprolol succinate 50 mg daily and we again discussed GDMT given cardiomyopathy/mild LV dysfunction.  His EF has been variable over the years but initially improved and at last check in May LVEF was 45 to 50%.  I ultimately recommended he start low-dose ARB but he continues at this time to prefer to limit medications which is reasonable.  He has had occasional dizziness and lightheadedness and adding additional therapy may exacerbate that.  Ultimately we we will plan to reassess LV function with echo at next visit in 6 months and he will consider if he wishes to start additional GDMT depending somewhat on progression or stability of LV function.      Jeffery Pérez MD, FACC, FHRS  Cardiology and Cardiac Electrophysiology  [facility] Office: [phone]  [facility] [place]: [facility]   11/21/2024

## 2024-11-21 NOTE — PATIENT INSTRUCTIONS
We talked about the fact that your heart's pumping function is a little bit weak and this has been something that has been present before and then improved but when we last checked in May 2024 the pumping function was decreased somewhat.  Your EF (ejection fraction) at that time was 45-50%  Normal is 55%.    I recommended starting a low-dose medication in addition to the 2 medicines you are already on that may help improve this weakness of your pumping function but also could lower blood pressure little and might make you a little bit lightheaded at times.  We will really check your ejection and echocardiogram at next office visit in 6 months and continue this conversation.  The medicines that we might try include valsartan or similar, Jardiance/Farxiga, or Entresto.  My suggestion would be to start with a low-dose of valsartan as that is generic and would not reduce blood pressure too much.

## 2024-12-02 ENCOUNTER — TELEPHONE (OUTPATIENT)
Dept: SCHEDULING | Facility: CLINIC | Age: 79
End: 2024-12-02
Payer: MEDICARE

## 2024-12-02 NOTE — TELEPHONE ENCOUNTER
Pt calling as refill for Eliquis did not go through.  Requesting refill be resent. Pt can be reached at [phone]

## 2024-12-02 NOTE — TELEPHONE ENCOUNTER
"Medicine Refill Request    Last Office Visit: 11/21/2024   Last Consult Visit: Visit date not found  Last Telemedicine Visit: Visit date not found    Next Appointment: 5/29/2025      Current Outpatient Medications:     ELIQUIS 5 mg tablet, TAKE 1 TABLET BY MOUTH TWICE A DAY, Disp: 180 tablet, Rfl: 3    metoprolol succinate XL (TOPROL-XL) 50 mg 24 hr tablet, TAKE 1 TABLET BY MOUTH EVERY DAY, Disp: 90 tablet, Rfl: 3      BP Readings from Last 3 Encounters:   11/21/24 (!) 140/82   05/16/24 (!) 150/110   05/16/24 130/80       Recent Lab results:  Lab Results   Component Value Date    CHOL 128 04/22/2018   ,   Lab Results   Component Value Date    HDL 55 04/22/2018   ,   Lab Results   Component Value Date    LDLCALC 65 04/22/2018   ,   Lab Results   Component Value Date    TRIG 38 04/22/2018        Lab Results   Component Value Date    GLUCOSE 142 (H) 06/04/2018   , No results found for: \"HGBA1C\"      Lab Results   Component Value Date    CREATININE 1.2 06/04/2018       Lab Results   Component Value Date    TSH 3.90 04/23/2018         No results found for: \"HGBA1C\"   "

## 2025-02-04 RX ORDER — METOPROLOL SUCCINATE 50 MG/1
50 TABLET, EXTENDED RELEASE ORAL DAILY
Qty: 90 TABLET | Refills: 3 | Status: SHIPPED | OUTPATIENT
Start: 2025-02-04

## 2025-02-04 NOTE — TELEPHONE ENCOUNTER
"  Medicine Refill Insert    Name of Patient: Alex Gonzalez    Caller's name/Relationship: self     Callback number: [phone]     Medication Name, Dosage, Supply: metoprolol succinate XL (TOPROL-XL) 50 mg 24 hr tablet 90#     Quantity left: few     Pharmacy: [facility]     Medicine Refill Request    Last Office Visit: Visit date not found   Last Consult Visit: Visit date not found  Last Telemedicine Visit: Visit date not found    Next Appointment: Visit date not found      Current Outpatient Medications:     apixaban (ELIQUIS) 5 mg tablet, Take 1 tablet (5 mg total) by mouth 2 (two) times a day., Disp: 180 tablet, Rfl: 3    metoprolol succinate XL (TOPROL-XL) 50 mg 24 hr tablet, TAKE 1 TABLET BY MOUTH EVERY DAY, Disp: 90 tablet, Rfl: 3      BP Readings from Last 3 Encounters:   11/21/24 (!) 140/82   05/16/24 (!) 150/110   05/16/24 130/80       Recent Lab results:  Lab Results   Component Value Date    CHOL 128 04/22/2018   ,   Lab Results   Component Value Date    HDL 55 04/22/2018   ,   Lab Results   Component Value Date    LDLCALC 65 04/22/2018   ,   Lab Results   Component Value Date    TRIG 38 04/22/2018        Lab Results   Component Value Date    GLUCOSE 142 (H) 06/04/2018   , No results found for: \"HGBA1C\"      Lab Results   Component Value Date    CREATININE 1.2 06/04/2018       Lab Results   Component Value Date    TSH 3.90 04/23/2018         No results found for: \"HGBA1C\"  "

## 2025-05-29 ENCOUNTER — HOSPITAL ENCOUNTER (OUTPATIENT)
Dept: CARDIOLOGY | Facility: CLINIC | Age: 80
Discharge: HOME | End: 2025-05-29
Attending: INTERNAL MEDICINE
Payer: MEDICARE

## 2025-05-29 ENCOUNTER — OFFICE VISIT (OUTPATIENT)
Dept: CARDIOLOGY | Facility: CLINIC | Age: 80
End: 2025-05-29
Payer: MEDICARE

## 2025-05-29 VITALS
BODY MASS INDEX: 22.82 KG/M2 | HEART RATE: 104 BPM | HEIGHT: 71 IN | SYSTOLIC BLOOD PRESSURE: 148 MMHG | DIASTOLIC BLOOD PRESSURE: 98 MMHG | WEIGHT: 163 LBS | OXYGEN SATURATION: 100 %

## 2025-05-29 VITALS
WEIGHT: 163 LBS | BODY MASS INDEX: 22.82 KG/M2 | SYSTOLIC BLOOD PRESSURE: 152 MMHG | DIASTOLIC BLOOD PRESSURE: 89 MMHG | HEIGHT: 71 IN

## 2025-05-29 DIAGNOSIS — I42.9 CARDIOMYOPATHY, UNSPECIFIED TYPE (CMS/HCC): ICD-10-CM

## 2025-05-29 DIAGNOSIS — I48.11 LONGSTANDING PERSISTENT ATRIAL FIBRILLATION (CMS/HCC): ICD-10-CM

## 2025-05-29 DIAGNOSIS — E78.2 MIXED HYPERLIPIDEMIA: ICD-10-CM

## 2025-05-29 DIAGNOSIS — I50.9 CHRONIC CONGESTIVE HEART FAILURE, UNSPECIFIED HEART FAILURE TYPE (CMS/HCC): ICD-10-CM

## 2025-05-29 DIAGNOSIS — I48.11 LONGSTANDING PERSISTENT ATRIAL FIBRILLATION (CMS/HCC): Primary | ICD-10-CM

## 2025-05-29 LAB
AORTIC ROOT ANNULUS: 3.1 CM
ASCENDING AORTA: 3.5 CM
AV PEAK GRADIENT: 6 MMHG
AV PEAK VELOCITY-S: 1.25 M/S
AV REG PEAK VEL: 2.67 M/S
AV REGURGITATION PRESSURE HALF TIME: 651 MS
AV VALVE AREA: 1.73 CM2
BSA FOR ECHO PROCEDURE: 1.92 M2
DESCENDING AORTA: 2.5 CM
E WAVE DECELERATION TIME: 156 MS
E/A RATIO: 2.2
E/E' RATIO: 8.3
E/LAT E' RATIO: 8.6
EDV (BP): 83.8 CM3
EF (A4C): 44.6 %
EF A2C: 42.6 %
EJECTION FRACTION: 45.7 %
EST RIGHT VENT SYSTOLIC PRESSURE BY TRICUSPID REGURGITATION JET: 36 MMHG
ESV (BP): 45.5 CM3
FRACTIONAL SHORTENING: 23.36 %
INTERVENTRICULAR SEPTUM: 1.07 CM
LA ESV INDEX (A2C): 30.89 CM3/M2
LA/AORTA RATIO: 1.42
LAAS-AP2: 21.5 CM2
LAAS-AP4: 15.4 CM2
LAD 2D: 4.4 CM
LAL MED-LAT (A4C): 4.95 CM
LAV-S: 59.3 CM3
LEFT ATRIAL LENGTH SUPERIOR-INFERIOR (APICAL 2-CHAMBER VIEW): 6.24 CM
LEFT ATRIUM VOLUME INDEX: 20.78 CM3/M2
LEFT ATRIUM VOLUME: 39.9 CM3
LEFT INTERNAL DIMENSION IN SYSTOLE: 3.38 CM (ref 2.73–4.14)
LEFT VENTRICLE DIASTOLIC VOLUME INDEX: 37.97 CM3/M2
LEFT VENTRICLE DIASTOLIC VOLUME: 72.9 CM3
LEFT VENTRICLE SYSTOLIC VOLUME INDEX: 20.99 CM3/M2
LEFT VENTRICLE SYSTOLIC VOLUME: 40.3 CM3
LEFT VENTRICULAR INTERNAL DIMENSION IN DIASTOLE: 4.41 CM (ref 4.64–6.44)
LEFT VENTRICULAR POSTERIOR WALL IN END DIASTOLE: 1.12 CM (ref 0.61–1.13)
LV DIASTOLIC VOLUME: 89.7 CM3
LV ESV (APICAL 2 CHAMBER): 51.4 CM3
LVAD-AP2: 27 CM2
LVAD-AP4: 23.7 CM2
LVAS-AP2: 19.1 CM2
LVAS-AP4: 16.8 CM2
LVEDVI(A2C): 46.72 CM3/M2
LVEDVI(BP): 43.65 CM3/M2
LVESVI(A2C): 26.77 CM3/M2
LVESVI(BP): 23.7 CM3/M2
LVLD-AP2: 6.85 CM
LVLD-AP4: 6.34 CM
LVLS-AP2: 5.95 CM
LVLS-AP4: 5.95 CM
LVOT 2D: 1.9 CM
LVOT A: 2.84 CM2
LVOT PEAK VELOCITY: 0.97 M/S
LVOT PG: 4 MMHG
MLH CV ECHO AVA INDEX VELOCITY RATIO: 0.9
MV E'TISSUE VEL-LAT: 0.11 M/S
MV E'TISSUE VEL-MED: 0.11 M/S
MV PEAK A VEL: 0.42 M/S
MV PEAK E VEL: 0.95 M/S
PISA AR MAX VEL: 2.81 M/S
POSTERIOR WALL: 1.12 CM
QRS DURATION: 86
QT INTERVAL: 394
QTC CALCULATION(BAZETT): 465
R AXIS: 89
RAP: 3 MMHG
RVOT VMAX: 0.57 M/S
SEPTAL TISSUE DOPPLER FREE WALL LATE DIA VELOCITY (APICAL 4 CHAMBER VIEW): 0.09 M/S
T WAVE AXIS: 91
TR MAX PG: 32.95 MMHG
TRICUSPID VALVE PEAK REGURGITATION VELOCITY: 2.87 M/S
VENTRICULAR RATE: 84
Z-SCORE OF LEFT VENTRICULAR DIMENSION IN END DIASTOLE: -2.14
Z-SCORE OF LEFT VENTRICULAR DIMENSION IN END SYSTOLE: 0.04
Z-SCORE OF LEFT VENTRICULAR POSTERIOR WALL IN END DIASTOLE: 1.59

## 2025-05-29 PROCEDURE — 99214 OFFICE O/P EST MOD 30 MIN: CPT | Performed by: INTERNAL MEDICINE

## 2025-05-29 PROCEDURE — 93306 TTE W/DOPPLER COMPLETE: CPT | Performed by: INTERNAL MEDICINE

## 2025-05-29 PROCEDURE — 93000 ELECTROCARDIOGRAM COMPLETE: CPT | Performed by: INTERNAL MEDICINE

## 2025-05-29 RX ORDER — SACUBITRIL AND VALSARTAN 24; 26 MG/1; MG/1
1 TABLET, FILM COATED ORAL 2 TIMES DAILY
Qty: 180 TABLET | Refills: 3 | Status: SHIPPED | OUTPATIENT
Start: 2025-05-29

## 2025-06-18 ENCOUNTER — TELEPHONE (OUTPATIENT)
Dept: CARDIOLOGY | Facility: CLINIC | Age: 80
End: 2025-06-18
Payer: MEDICARE

## 2025-06-18 NOTE — TELEPHONE ENCOUNTER
Called Pt 6/18/25 to R/S Appt with Dr. Rola LORENZO is covering [facility] for Inpatient.  Please Re-schedule upon return call.    Scheduling into October at this time.   Thank you